# Patient Record
Sex: MALE | Race: WHITE | NOT HISPANIC OR LATINO | Employment: FULL TIME | URBAN - METROPOLITAN AREA
[De-identification: names, ages, dates, MRNs, and addresses within clinical notes are randomized per-mention and may not be internally consistent; named-entity substitution may affect disease eponyms.]

---

## 2023-03-09 LAB
ALANINE AMINOTRANSFERASE (SGPT) (U/L) IN SER/PLAS: 19 U/L (ref 10–52)
ALBUMIN (G/DL) IN SER/PLAS: 3.8 G/DL (ref 3.4–5)
ALKALINE PHOSPHATASE (U/L) IN SER/PLAS: 99 U/L (ref 33–136)
ANION GAP IN SER/PLAS: 16 MMOL/L (ref 10–20)
ASPARTATE AMINOTRANSFERASE (SGOT) (U/L) IN SER/PLAS: 45 U/L (ref 9–39)
BASOPHILS (10*3/UL) IN BLOOD BY AUTOMATED COUNT: 0.03 X10E9/L (ref 0–0.1)
BASOPHILS/100 LEUKOCYTES IN BLOOD BY AUTOMATED COUNT: 1 % (ref 0–2)
BILIRUBIN TOTAL (MG/DL) IN SER/PLAS: 0.4 MG/DL (ref 0–1.2)
CALCIUM (MG/DL) IN SER/PLAS: 8.5 MG/DL (ref 8.6–10.3)
CARBON DIOXIDE, TOTAL (MMOL/L) IN SER/PLAS: 24 MMOL/L (ref 21–32)
CHLORIDE (MMOL/L) IN SER/PLAS: 93 MMOL/L (ref 98–107)
CREATININE (MG/DL) IN SER/PLAS: 0.69 MG/DL (ref 0.5–1.3)
EOSINOPHILS (10*3/UL) IN BLOOD BY AUTOMATED COUNT: 0.05 X10E9/L (ref 0–0.7)
EOSINOPHILS/100 LEUKOCYTES IN BLOOD BY AUTOMATED COUNT: 1.6 % (ref 0–6)
ERYTHROCYTE DISTRIBUTION WIDTH (RATIO) BY AUTOMATED COUNT: 14.8 % (ref 11.5–14.5)
ERYTHROCYTE MEAN CORPUSCULAR HEMOGLOBIN CONCENTRATION (G/DL) BY AUTOMATED: 33 G/DL (ref 32–36)
ERYTHROCYTE MEAN CORPUSCULAR VOLUME (FL) BY AUTOMATED COUNT: 94 FL (ref 80–100)
ERYTHROCYTES (10*6/UL) IN BLOOD BY AUTOMATED COUNT: 3.57 X10E12/L (ref 4.5–5.9)
ESTIMATED AVERAGE GLUCOSE FOR HBA1C: 146 MG/DL
FERRITIN (UG/LL) IN SER/PLAS: 48 UG/L (ref 20–300)
GFR MALE: >90 ML/MIN/1.73M2
GLUCOSE (MG/DL) IN SER/PLAS: 130 MG/DL (ref 74–99)
HEMATOCRIT (%) IN BLOOD BY AUTOMATED COUNT: 33.6 % (ref 41–52)
HEMOGLOBIN (G/DL) IN BLOOD: 11.1 G/DL (ref 13.5–17.5)
HEMOGLOBIN A1C/HEMOGLOBIN TOTAL IN BLOOD: 6.7 %
IMMATURE GRANULOCYTES/100 LEUKOCYTES IN BLOOD BY AUTOMATED COUNT: 0.3 % (ref 0–0.9)
INR IN PPP BY COAGULATION ASSAY: 1.3 (ref 0.9–1.1)
LEUKOCYTES (10*3/UL) IN BLOOD BY AUTOMATED COUNT: 3.1 X10E9/L (ref 4.4–11.3)
LYMPHOCYTES (10*3/UL) IN BLOOD BY AUTOMATED COUNT: 0.8 X10E9/L (ref 1.2–4.8)
LYMPHOCYTES/100 LEUKOCYTES IN BLOOD BY AUTOMATED COUNT: 25.7 % (ref 13–44)
MONOCYTES (10*3/UL) IN BLOOD BY AUTOMATED COUNT: 0.48 X10E9/L (ref 0.1–1)
MONOCYTES/100 LEUKOCYTES IN BLOOD BY AUTOMATED COUNT: 15.4 % (ref 2–10)
NEUTROPHILS (10*3/UL) IN BLOOD BY AUTOMATED COUNT: 1.74 X10E9/L (ref 1.2–7.7)
NEUTROPHILS/100 LEUKOCYTES IN BLOOD BY AUTOMATED COUNT: 56 % (ref 40–80)
PLATELETS (10*3/UL) IN BLOOD AUTOMATED COUNT: 183 X10E9/L (ref 150–450)
POTASSIUM (MMOL/L) IN SER/PLAS: 4.7 MMOL/L (ref 3.5–5.3)
PROTEIN TOTAL: 7 G/DL (ref 6.4–8.2)
PROTHROMBIN TIME (PT) IN PPP BY COAGULATION ASSAY: 14.8 SEC (ref 9.8–13.4)
SODIUM (MMOL/L) IN SER/PLAS: 128 MMOL/L (ref 136–145)
UREA NITROGEN (MG/DL) IN SER/PLAS: 10 MG/DL (ref 6–23)

## 2023-03-10 LAB — STAPH/MRSA SCREEN, CULTURE: NORMAL

## 2023-03-11 LAB
ANION GAP IN SER/PLAS: 12 MMOL/L (ref 10–20)
CALCIUM (MG/DL) IN SER/PLAS: 8.3 MG/DL (ref 8.6–10.3)
CARBON DIOXIDE, TOTAL (MMOL/L) IN SER/PLAS: 25 MMOL/L (ref 21–32)
CHLORIDE (MMOL/L) IN SER/PLAS: 100 MMOL/L (ref 98–107)
CREATININE (MG/DL) IN SER/PLAS: 0.78 MG/DL (ref 0.5–1.3)
GFR MALE: >90 ML/MIN/1.73M2
GLUCOSE (MG/DL) IN SER/PLAS: 185 MG/DL (ref 74–99)
POTASSIUM (MMOL/L) IN SER/PLAS: 4.8 MMOL/L (ref 3.5–5.3)
SODIUM (MMOL/L) IN SER/PLAS: 132 MMOL/L (ref 136–145)
UREA NITROGEN (MG/DL) IN SER/PLAS: 10 MG/DL (ref 6–23)

## 2023-03-25 LAB — SARS-COV-2 RESULT: NOT DETECTED

## 2023-04-03 ENCOUNTER — PATIENT OUTREACH (OUTPATIENT)
Dept: CARE COORDINATION | Facility: CLINIC | Age: 63
End: 2023-04-03

## 2023-04-03 ENCOUNTER — DOCUMENTATION (OUTPATIENT)
Dept: CARE COORDINATION | Facility: CLINIC | Age: 63
End: 2023-04-03

## 2023-04-11 RX ORDER — POTASSIUM CHLORIDE 1500 MG/1
1 TABLET, EXTENDED RELEASE ORAL DAILY
COMMUNITY
Start: 2022-07-06 | End: 2024-01-05 | Stop reason: WASHOUT

## 2023-04-11 RX ORDER — METOPROLOL TARTRATE 50 MG/1
50 TABLET ORAL 2 TIMES DAILY
COMMUNITY
Start: 2021-08-05 | End: 2023-12-14 | Stop reason: WASHOUT

## 2023-04-11 RX ORDER — LANOLIN ALCOHOL/MO/W.PET/CERES
1 CREAM (GRAM) TOPICAL DAILY
COMMUNITY
Start: 2022-07-06

## 2023-04-11 RX ORDER — APIXABAN 5 MG/1
5 TABLET, FILM COATED ORAL 2 TIMES DAILY
COMMUNITY
Start: 2021-08-05 | End: 2023-12-14 | Stop reason: WASHOUT

## 2023-04-11 RX ORDER — TAMSULOSIN HYDROCHLORIDE 0.4 MG/1
0.4 CAPSULE ORAL DAILY
COMMUNITY
Start: 2020-02-27 | End: 2023-12-14 | Stop reason: WASHOUT

## 2023-04-11 RX ORDER — KETOCONAZOLE 20 MG/G
CREAM TOPICAL
COMMUNITY
Start: 2021-10-29

## 2023-04-11 RX ORDER — FUROSEMIDE 40 MG/1
20 TABLET ORAL DAILY
COMMUNITY
Start: 2022-06-11 | End: 2023-12-06 | Stop reason: SDUPTHER

## 2023-04-11 RX ORDER — METFORMIN HYDROCHLORIDE 500 MG/1
500 TABLET ORAL 2 TIMES DAILY
COMMUNITY
Start: 2020-03-24 | End: 2023-05-17 | Stop reason: SDUPTHER

## 2023-04-11 RX ORDER — FOLIC ACID 1 MG/1
1 TABLET ORAL DAILY
COMMUNITY
Start: 2015-10-14 | End: 2023-12-06 | Stop reason: SDUPTHER

## 2023-04-11 RX ORDER — MULTIVITAMIN
1 TABLET ORAL DAILY
COMMUNITY
End: 2023-04-12 | Stop reason: SDUPTHER

## 2023-04-11 RX ORDER — IBUPROFEN 200 MG
1 TABLET ORAL DAILY
COMMUNITY

## 2023-04-11 RX ORDER — SULFASALAZINE 500 MG/1
TABLET ORAL 2 TIMES DAILY
COMMUNITY
Start: 2013-08-16 | End: 2023-04-12 | Stop reason: ALTCHOICE

## 2023-04-11 RX ORDER — LISINOPRIL 20 MG/1
1 TABLET ORAL DAILY
COMMUNITY
Start: 2022-06-11 | End: 2023-12-14 | Stop reason: WASHOUT

## 2023-04-11 RX ORDER — NIFEDIPINE 60 MG/1
1 TABLET, EXTENDED RELEASE ORAL DAILY
COMMUNITY
Start: 2022-09-29 | End: 2023-05-17 | Stop reason: SDUPTHER

## 2023-04-11 RX ORDER — SPIRONOLACTONE 100 MG/1
50 TABLET, FILM COATED ORAL DAILY
COMMUNITY
Start: 2022-06-11 | End: 2024-01-05 | Stop reason: WASHOUT

## 2023-04-12 ENCOUNTER — OFFICE VISIT (OUTPATIENT)
Dept: PRIMARY CARE | Facility: CLINIC | Age: 63
End: 2023-04-12
Payer: COMMERCIAL

## 2023-04-12 VITALS
BODY MASS INDEX: 26.97 KG/M2 | HEART RATE: 130 BPM | DIASTOLIC BLOOD PRESSURE: 70 MMHG | TEMPERATURE: 97.8 F | OXYGEN SATURATION: 98 % | WEIGHT: 188.4 LBS | SYSTOLIC BLOOD PRESSURE: 118 MMHG | HEIGHT: 70 IN

## 2023-04-12 DIAGNOSIS — Z96.642 S/P TOTAL LEFT HIP ARTHROPLASTY: Primary | ICD-10-CM

## 2023-04-12 DIAGNOSIS — I48.0 PAF (PAROXYSMAL ATRIAL FIBRILLATION) (MULTI): ICD-10-CM

## 2023-04-12 DIAGNOSIS — E11.9 TYPE 2 DIABETES MELLITUS WITHOUT COMPLICATION, WITHOUT LONG-TERM CURRENT USE OF INSULIN (MULTI): ICD-10-CM

## 2023-04-12 DIAGNOSIS — I10 ESSENTIAL HYPERTENSION: ICD-10-CM

## 2023-04-12 PROBLEM — C61 ADENOCARCINOMA OF PROSTATE (MULTI): Status: ACTIVE | Noted: 2023-04-12

## 2023-04-12 PROBLEM — E83.42 HYPOMAGNESEMIA: Status: ACTIVE | Noted: 2023-04-12

## 2023-04-12 PROBLEM — N52.9 ERECTILE DYSFUNCTION: Status: ACTIVE | Noted: 2023-04-12

## 2023-04-12 PROBLEM — G47.33 OSA ON CPAP: Status: ACTIVE | Noted: 2023-04-12

## 2023-04-12 PROBLEM — E83.110 HEREDITARY HEMOCHROMATOSIS (CMS-HCC): Status: ACTIVE | Noted: 2023-04-12

## 2023-04-12 PROBLEM — K51.90 ULCERATIVE COLITIS (MULTI): Status: ACTIVE | Noted: 2023-04-12

## 2023-04-12 PROBLEM — M16.12 PRIMARY LOCALIZED OSTEOARTHROSIS OF LEFT HIP: Status: ACTIVE | Noted: 2023-04-12

## 2023-04-12 PROBLEM — M54.50 LOWER BACK PAIN: Status: ACTIVE | Noted: 2023-04-12

## 2023-04-12 PROCEDURE — 4010F ACE/ARB THERAPY RXD/TAKEN: CPT | Performed by: INTERNAL MEDICINE

## 2023-04-12 PROCEDURE — 93000 ELECTROCARDIOGRAM COMPLETE: CPT | Performed by: INTERNAL MEDICINE

## 2023-04-12 PROCEDURE — 3078F DIAST BP <80 MM HG: CPT | Performed by: INTERNAL MEDICINE

## 2023-04-12 PROCEDURE — 3046F HEMOGLOBIN A1C LEVEL >9.0%: CPT | Performed by: INTERNAL MEDICINE

## 2023-04-12 PROCEDURE — 1036F TOBACCO NON-USER: CPT | Performed by: INTERNAL MEDICINE

## 2023-04-12 PROCEDURE — 3074F SYST BP LT 130 MM HG: CPT | Performed by: INTERNAL MEDICINE

## 2023-04-12 PROCEDURE — 99214 OFFICE O/P EST MOD 30 MIN: CPT | Performed by: INTERNAL MEDICINE

## 2023-04-12 RX ORDER — NAPROXEN SODIUM 220 MG
TABLET ORAL EVERY 12 HOURS PRN
COMMUNITY

## 2023-04-12 ASSESSMENT — ENCOUNTER SYMPTOMS
MUSCULOSKELETAL NEGATIVE: 1
CONSTITUTIONAL NEGATIVE: 1
RESPIRATORY NEGATIVE: 1
CARDIOVASCULAR NEGATIVE: 1
HEMATOLOGIC/LYMPHATIC NEGATIVE: 1
GASTROINTESTINAL NEGATIVE: 1
ALLERGIC/IMMUNOLOGIC NEGATIVE: 1
PSYCHIATRIC NEGATIVE: 1
NEUROLOGICAL NEGATIVE: 1
ENDOCRINE NEGATIVE: 1
EYES NEGATIVE: 1

## 2023-04-12 NOTE — PROGRESS NOTES
"Subjective   Patient ID: Kiko Petersen is a 62 y.o. male who presents for Hospital Follow-up.  Patient presents today in follow up re:   recent hip arthroplasty on the left.  I am unclear as to why he has medical follow up today, but I am glad he is here as we discovered by exam and confirmed with ECG that patient is back in a-fib.  He has not been in a-fib for quite some time and just saw Dr. Burton 2 weeks ago for pre-op and was in NSR.  He reports he is feeling well and recovery from the surgery is going well.        Review of Systems   Constitutional: Negative.    HENT: Negative.     Eyes: Negative.    Respiratory: Negative.     Cardiovascular: Negative.    Gastrointestinal: Negative.    Endocrine: Negative.    Genitourinary: Negative.    Musculoskeletal: Negative.    Skin: Negative.    Allergic/Immunologic: Negative.    Neurological: Negative.    Hematological: Negative.    Psychiatric/Behavioral: Negative.         Objective     Blood pressure 118/70, pulse (!) 130, temperature 36.6 °C (97.8 °F), temperature source Temporal, height 1.765 m (5' 9.5\"), weight 85.5 kg (188 lb 6.4 oz), SpO2 98 %.   Physical Exam  Constitutional:       Appearance: Normal appearance.   Neck:      Vascular: No carotid bruit.   Cardiovascular:      Rate and Rhythm: Tachycardia present. Rhythm irregular.      Pulses: Normal pulses.      Heart sounds: Normal heart sounds. No murmur heard.  Pulmonary:      Effort: Pulmonary effort is normal.      Breath sounds: Normal breath sounds. No wheezing or rales.   Abdominal:      General: Abdomen is flat. There is no distension.      Palpations: Abdomen is soft.      Tenderness: There is no abdominal tenderness.   Musculoskeletal:         General: Normal range of motion.      Cervical back: Normal range of motion and neck supple.   Skin:     General: Skin is warm and dry.   Neurological:      General: No focal deficit present.      Mental Status: He is alert and oriented to person, place, and " time.   Psychiatric:         Mood and Affect: Mood normal.         Behavior: Behavior normal.         Assessment/Plan   Problem List Items Addressed This Visit          Circulatory    Essential hypertension       Endocrine/Metabolic    Type 2 diabetes mellitus without complication (CMS/HCC)     Other Visit Diagnoses       S/P total left hip arthroplasty    -  Primary    PAF (paroxysmal atrial fibrillation) (CMS/HCC)        Relevant Medications    metoprolol tartrate (Lopressor) 50 mg tablet    NIFEdipine CC 60 mg 24 hr tablet        Patient is doing well post-op.  A-fib is recurrent and we will arrange for OV with Dr. Josephine ASAP.  BP well controlled on current therapy.  Will continue present therapy and follow.  Sugars well controlled overall with A1c at goal.  Will continue present medical therapy and follow up.    Follow up as scheduled

## 2023-05-15 LAB
ALANINE AMINOTRANSFERASE (SGPT) (U/L) IN SER/PLAS: 11 U/L (ref 10–52)
ALBUMIN (G/DL) IN SER/PLAS: 3.9 G/DL (ref 3.4–5)
ALKALINE PHOSPHATASE (U/L) IN SER/PLAS: 89 U/L (ref 33–136)
ANION GAP IN SER/PLAS: 16 MMOL/L (ref 10–20)
ASPARTATE AMINOTRANSFERASE (SGOT) (U/L) IN SER/PLAS: 29 U/L (ref 9–39)
BASOPHILS (10*3/UL) IN BLOOD BY AUTOMATED COUNT: 0.07 X10E9/L (ref 0–0.1)
BASOPHILS/100 LEUKOCYTES IN BLOOD BY AUTOMATED COUNT: 2 % (ref 0–2)
BILIRUBIN TOTAL (MG/DL) IN SER/PLAS: 0.5 MG/DL (ref 0–1.2)
CALCIUM (MG/DL) IN SER/PLAS: 9 MG/DL (ref 8.6–10.6)
CARBON DIOXIDE, TOTAL (MMOL/L) IN SER/PLAS: 25 MMOL/L (ref 21–32)
CHLORIDE (MMOL/L) IN SER/PLAS: 94 MMOL/L (ref 98–107)
CHOLESTEROL (MG/DL) IN SER/PLAS: 154 MG/DL (ref 0–199)
CHOLESTEROL IN HDL (MG/DL) IN SER/PLAS: 88.9 MG/DL
CHOLESTEROL/HDL RATIO: 1.7
CREATININE (MG/DL) IN SER/PLAS: 0.74 MG/DL (ref 0.5–1.3)
EOSINOPHILS (10*3/UL) IN BLOOD BY AUTOMATED COUNT: 0.19 X10E9/L (ref 0–0.7)
EOSINOPHILS/100 LEUKOCYTES IN BLOOD BY AUTOMATED COUNT: 5.5 % (ref 0–6)
ERYTHROCYTE DISTRIBUTION WIDTH (RATIO) BY AUTOMATED COUNT: 14.1 % (ref 11.5–14.5)
ERYTHROCYTE MEAN CORPUSCULAR HEMOGLOBIN CONCENTRATION (G/DL) BY AUTOMATED: 31.8 G/DL (ref 32–36)
ERYTHROCYTE MEAN CORPUSCULAR VOLUME (FL) BY AUTOMATED COUNT: 90 FL (ref 80–100)
ERYTHROCYTES (10*6/UL) IN BLOOD BY AUTOMATED COUNT: 3.75 X10E12/L (ref 4.5–5.9)
ESTIMATED AVERAGE GLUCOSE FOR HBA1C: 143 MG/DL
GFR MALE: >90 ML/MIN/1.73M2
GLUCOSE (MG/DL) IN SER/PLAS: 112 MG/DL (ref 74–99)
HEMATOCRIT (%) IN BLOOD BY AUTOMATED COUNT: 33.6 % (ref 41–52)
HEMOGLOBIN (G/DL) IN BLOOD: 10.7 G/DL (ref 13.5–17.5)
HEMOGLOBIN A1C/HEMOGLOBIN TOTAL IN BLOOD: 6.6 %
IMMATURE GRANULOCYTES/100 LEUKOCYTES IN BLOOD BY AUTOMATED COUNT: 0 % (ref 0–0.9)
LDL: 48 MG/DL (ref 0–99)
LEUKOCYTES (10*3/UL) IN BLOOD BY AUTOMATED COUNT: 3.4 X10E9/L (ref 4.4–11.3)
LYMPHOCYTES (10*3/UL) IN BLOOD BY AUTOMATED COUNT: 0.98 X10E9/L (ref 1.2–4.8)
LYMPHOCYTES/100 LEUKOCYTES IN BLOOD BY AUTOMATED COUNT: 28.5 % (ref 13–44)
MONOCYTES (10*3/UL) IN BLOOD BY AUTOMATED COUNT: 0.58 X10E9/L (ref 0.1–1)
MONOCYTES/100 LEUKOCYTES IN BLOOD BY AUTOMATED COUNT: 16.9 % (ref 2–10)
NEUTROPHILS (10*3/UL) IN BLOOD BY AUTOMATED COUNT: 1.62 X10E9/L (ref 1.2–7.7)
NEUTROPHILS/100 LEUKOCYTES IN BLOOD BY AUTOMATED COUNT: 47.1 % (ref 40–80)
NRBC (PER 100 WBCS) BY AUTOMATED COUNT: 0 /100 WBC (ref 0–0)
PLATELETS (10*3/UL) IN BLOOD AUTOMATED COUNT: 177 X10E9/L (ref 150–450)
POTASSIUM (MMOL/L) IN SER/PLAS: 4.7 MMOL/L (ref 3.5–5.3)
PROTEIN TOTAL: 6.8 G/DL (ref 6.4–8.2)
SODIUM (MMOL/L) IN SER/PLAS: 130 MMOL/L (ref 136–145)
TRIGLYCERIDE (MG/DL) IN SER/PLAS: 87 MG/DL (ref 0–149)
UREA NITROGEN (MG/DL) IN SER/PLAS: 9 MG/DL (ref 6–23)
VLDL: 17 MG/DL (ref 0–40)

## 2023-05-17 ENCOUNTER — OFFICE VISIT (OUTPATIENT)
Dept: PRIMARY CARE | Facility: CLINIC | Age: 63
End: 2023-05-17
Payer: COMMERCIAL

## 2023-05-17 VITALS
WEIGHT: 185.8 LBS | OXYGEN SATURATION: 99 % | DIASTOLIC BLOOD PRESSURE: 60 MMHG | BODY MASS INDEX: 26.6 KG/M2 | HEIGHT: 70 IN | SYSTOLIC BLOOD PRESSURE: 118 MMHG | TEMPERATURE: 98 F | HEART RATE: 78 BPM

## 2023-05-17 DIAGNOSIS — I10 ESSENTIAL HYPERTENSION: ICD-10-CM

## 2023-05-17 DIAGNOSIS — J30.1 SEASONAL ALLERGIC RHINITIS DUE TO POLLEN: ICD-10-CM

## 2023-05-17 DIAGNOSIS — Z96.642 HISTORY OF LEFT HIP REPLACEMENT: ICD-10-CM

## 2023-05-17 DIAGNOSIS — E11.9 TYPE 2 DIABETES MELLITUS WITHOUT COMPLICATION, WITHOUT LONG-TERM CURRENT USE OF INSULIN (MULTI): Primary | ICD-10-CM

## 2023-05-17 DIAGNOSIS — C61 ADENOCARCINOMA OF PROSTATE (MULTI): ICD-10-CM

## 2023-05-17 PROCEDURE — 4010F ACE/ARB THERAPY RXD/TAKEN: CPT | Performed by: INTERNAL MEDICINE

## 2023-05-17 PROCEDURE — 3078F DIAST BP <80 MM HG: CPT | Performed by: INTERNAL MEDICINE

## 2023-05-17 PROCEDURE — 99214 OFFICE O/P EST MOD 30 MIN: CPT | Performed by: INTERNAL MEDICINE

## 2023-05-17 PROCEDURE — 3074F SYST BP LT 130 MM HG: CPT | Performed by: INTERNAL MEDICINE

## 2023-05-17 PROCEDURE — 1036F TOBACCO NON-USER: CPT | Performed by: INTERNAL MEDICINE

## 2023-05-17 PROCEDURE — 3044F HG A1C LEVEL LT 7.0%: CPT | Performed by: INTERNAL MEDICINE

## 2023-05-17 RX ORDER — NIFEDIPINE 60 MG/1
60 TABLET, FILM COATED, EXTENDED RELEASE ORAL DAILY
Qty: 90 TABLET | Refills: 0 | Status: SHIPPED | OUTPATIENT
Start: 2023-05-17 | End: 2023-12-14 | Stop reason: SDUPTHER

## 2023-05-17 RX ORDER — METFORMIN HYDROCHLORIDE 500 MG/1
500 TABLET ORAL
Qty: 180 TABLET | Refills: 0 | Status: SHIPPED | OUTPATIENT
Start: 2023-05-17 | End: 2023-08-16 | Stop reason: SDUPTHER

## 2023-05-17 ASSESSMENT — ENCOUNTER SYMPTOMS
ENDOCRINE NEGATIVE: 1
HEMATOLOGIC/LYMPHATIC NEGATIVE: 1
EYES NEGATIVE: 1
GASTROINTESTINAL NEGATIVE: 1
RESPIRATORY NEGATIVE: 1
PSYCHIATRIC NEGATIVE: 1
CONSTITUTIONAL NEGATIVE: 1
CARDIOVASCULAR NEGATIVE: 1
ALLERGIC/IMMUNOLOGIC NEGATIVE: 1
NEUROLOGICAL NEGATIVE: 1
MUSCULOSKELETAL NEGATIVE: 1

## 2023-05-17 NOTE — PROGRESS NOTES
"Subjective   Patient ID: Kiko Petersen is a 62 y.o. male who presents for 3 month follow up.  Patient presents in follow up regarding hypertension.  Blood pressure has been well controlled on current therapy.  No adverse effects of medication reported.  Patient denies chest pain, palpitations, shortness of breath, orthopnea, fatigue or edema.    Patient presents in follow up re:  Type 2 diabetes.  Patient has been compliant with medical therapy as prescribed and mostly compliant with diet and exercise recommendations.  HgbA1c has been maintained at goal level.  No hypoglycemia reported and no other associated complaints.    Patient underwent left hip arthroplasty on 3/28 with good outcome.  He did have anemia post-op wth Hgb of 8.8, but has improved to 10.7 on recent lab.        Review of Systems   Constitutional: Negative.    HENT: Negative.     Eyes: Negative.    Respiratory: Negative.     Cardiovascular: Negative.    Gastrointestinal: Negative.    Endocrine: Negative.    Genitourinary: Negative.    Musculoskeletal: Negative.    Skin: Negative.    Allergic/Immunologic: Negative.    Neurological: Negative.    Hematological: Negative.    Psychiatric/Behavioral: Negative.         Objective     Blood pressure 118/60, pulse 78, temperature 36.7 °C (98 °F), temperature source Temporal, height 1.765 m (5' 9.5\"), weight 84.3 kg (185 lb 12.8 oz), SpO2 99 %.   Physical Exam  Constitutional:       Appearance: Normal appearance.   Neck:      Vascular: No carotid bruit.   Cardiovascular:      Rate and Rhythm: Normal rate and regular rhythm.      Pulses: Normal pulses.      Heart sounds: Normal heart sounds. No murmur heard.  Pulmonary:      Effort: Pulmonary effort is normal.      Breath sounds: Normal breath sounds. No wheezing or rales.   Abdominal:      General: Abdomen is flat. There is no distension.      Palpations: Abdomen is soft.      Tenderness: There is no abdominal tenderness.   Musculoskeletal:         General: " Normal range of motion.      Cervical back: Normal range of motion and neck supple.   Skin:     General: Skin is warm and dry.   Neurological:      General: No focal deficit present.      Mental Status: He is alert and oriented to person, place, and time.   Psychiatric:         Mood and Affect: Mood normal.         Behavior: Behavior normal.         Assessment/Plan   Problem List Items Addressed This Visit          Circulatory    Essential hypertension       Genitourinary    Adenocarcinoma of prostate (CMS/HCC)       Endocrine/Metabolic    Type 2 diabetes mellitus without complication (CMS/HCC) - Primary     Other Visit Diagnoses       History of left hip replacement        Seasonal allergic rhinitis due to pollen              Sugars well controlled overall with A1c at goal.  Will continue present medical therapy and follow up.  BP well controlled on current therapy.  Will continue present therapy and follow.  He is doing well s/p left hip arthroplasty.  He had colonoscopy in 8/2022.  He follows with Dr. Lyle re:  hx of prostate cancer.  He also follows with Hepatology for chronic liver issues.  He has been having issues with allergy Sx this spring.  He states that OTC therapy has not helped.  He is advised on using combination therapy with Claritin and Flonase in the generic forms.    > 30 minutes was spent with the patient today, the majority of which was spent on review of history and medications as well as counselling on care plan and/or coordination of care.     Follow up in 3 months  Lab to be drawn 1 week prior to next office visit.

## 2023-07-28 ENCOUNTER — PATIENT OUTREACH (OUTPATIENT)
Dept: CARE COORDINATION | Facility: CLINIC | Age: 63
End: 2023-07-28

## 2023-07-28 ENCOUNTER — PATIENT OUTREACH (OUTPATIENT)
Dept: PRIMARY CARE | Facility: CLINIC | Age: 63
End: 2023-07-28

## 2023-07-28 NOTE — PROGRESS NOTES
Medications  Medications reviewed with patient/caregiver?: Yes (7/28/2023  2:35 PM)  Is the patient having any side effects they believe may be caused by any medication additions or changes?: No (7/28/2023  2:35 PM)  Does the patient have all medications ordered at discharge?: No (hasn't picked them up yet) (7/28/2023  2:35 PM)  Prescription Comments: Urged Kiko to get his new scripts (7/28/2023  2:35 PM)    Appointments  Does the patient have a primary care provider?: Yes (follow up with specialists and PCP) (7/28/2023  2:35 PM)  Has the patient kept scheduled appointments due by today?: Not applicable (7/28/2023  2:35 PM)    Patient Teaching  Does the patient have access to their discharge instructions?: Yes (7/28/2023  2:35 PM)  Care Management Interventions: Reviewed instructions with patient (7/28/2023  2:35 PM)  What is the patient's perception of their health status since discharge?: Improving (7/28/2023  2:35 PM)  Is the patient/caregiver able to teach back the hierarchy of who to call/visit for symptoms/problems? PCP, Specialist, Home Health nurse, Urgent Care, ED, 911: Yes (7/28/2023  2:35 PM)    Wrap Up  Wrap Up Additional Comments: Kiko feels well, syncopal episodes have ceased, understands his fluid restriction, has appts scheduled.  Biggest concern now is financial (7/28/2023  2:35 PM)

## 2023-08-02 ENCOUNTER — PATIENT OUTREACH (OUTPATIENT)
Dept: CARE COORDINATION | Facility: CLINIC | Age: 63
End: 2023-08-02

## 2023-08-02 SDOH — ECONOMIC STABILITY: GENERAL: WOULD YOU LIKE HELP WITH ANY OF THE FOLLOWING NEEDS?: HOUSING INSTABILITY;EMPLOYMENT

## 2023-08-02 SDOH — ECONOMIC STABILITY: FOOD INSECURITY
ARE ANY OF YOUR NEEDS URGENT? FOR EXAMPLE, UNCERTAINTY OF WHERE YOU WILL GET YOUR NEXT MEAL OR NOT HAVING THE MEDICATIONS YOU NEED TO TAKE TOMORROW.: YES

## 2023-08-02 NOTE — PROGRESS NOTES
[facility] outreach call to patient per request by GEORGIA Galan RN Care Manager. Patient is seeking assistance with rent. Patient has applied for unemployment through [facility], struggling to get response. [facility] submitted referral via [email or web address] for agency in [facility] that provides rental assistance. [facility] sent e-mail follow up to patient.     [facility] encouraged patient to contact [place] [employer] to clarify status of unemployment application.    [facility] will follow up with patient in 1 week.     CECILIA Brandt   III   Population Health/Accountable Care Organization  Office Phone: [phone]  [email or web address]

## 2023-08-03 ENCOUNTER — LAB (OUTPATIENT)
Dept: LAB | Facility: LAB | Age: 63
End: 2023-08-03
Payer: COMMERCIAL

## 2023-08-03 ENCOUNTER — OFFICE VISIT (OUTPATIENT)
Dept: PRIMARY CARE | Facility: CLINIC | Age: 63
End: 2023-08-03
Payer: COMMERCIAL

## 2023-08-03 VITALS
HEIGHT: 70 IN | DIASTOLIC BLOOD PRESSURE: 62 MMHG | SYSTOLIC BLOOD PRESSURE: 139 MMHG | HEART RATE: 63 BPM | WEIGHT: 181.6 LBS | TEMPERATURE: 98 F | BODY MASS INDEX: 26 KG/M2 | OXYGEN SATURATION: 97 %

## 2023-08-03 DIAGNOSIS — R55 SYNCOPE, UNSPECIFIED SYNCOPE TYPE: ICD-10-CM

## 2023-08-03 DIAGNOSIS — E87.1 HYPONATREMIA: ICD-10-CM

## 2023-08-03 DIAGNOSIS — I10 ESSENTIAL HYPERTENSION: ICD-10-CM

## 2023-08-03 DIAGNOSIS — R55 SYNCOPE, UNSPECIFIED SYNCOPE TYPE: Primary | ICD-10-CM

## 2023-08-03 LAB
ANION GAP IN SER/PLAS: 13 MMOL/L (ref 10–20)
CALCIUM (MG/DL) IN SER/PLAS: 8.7 MG/DL (ref 8.6–10.3)
CARBON DIOXIDE, TOTAL (MMOL/L) IN SER/PLAS: 25 MMOL/L (ref 21–32)
CHLORIDE (MMOL/L) IN SER/PLAS: 103 MMOL/L (ref 98–107)
CREATININE (MG/DL) IN SER/PLAS: 0.75 MG/DL (ref 0.5–1.3)
GFR MALE: >90 ML/MIN/1.73M2
GLUCOSE (MG/DL) IN SER/PLAS: 86 MG/DL (ref 74–99)
POTASSIUM (MMOL/L) IN SER/PLAS: 4.7 MMOL/L (ref 3.5–5.3)
SODIUM (MMOL/L) IN SER/PLAS: 136 MMOL/L (ref 136–145)
UREA NITROGEN (MG/DL) IN SER/PLAS: 7 MG/DL (ref 6–23)

## 2023-08-03 PROCEDURE — 99214 OFFICE O/P EST MOD 30 MIN: CPT | Performed by: INTERNAL MEDICINE

## 2023-08-03 PROCEDURE — 80048 BASIC METABOLIC PNL TOTAL CA: CPT

## 2023-08-03 PROCEDURE — 3078F DIAST BP <80 MM HG: CPT | Performed by: INTERNAL MEDICINE

## 2023-08-03 PROCEDURE — 36415 COLL VENOUS BLD VENIPUNCTURE: CPT

## 2023-08-03 PROCEDURE — 4010F ACE/ARB THERAPY RXD/TAKEN: CPT | Performed by: INTERNAL MEDICINE

## 2023-08-03 PROCEDURE — 3075F SYST BP GE 130 - 139MM HG: CPT | Performed by: INTERNAL MEDICINE

## 2023-08-03 PROCEDURE — 3044F HG A1C LEVEL LT 7.0%: CPT | Performed by: INTERNAL MEDICINE

## 2023-08-03 PROCEDURE — 1036F TOBACCO NON-USER: CPT | Performed by: INTERNAL MEDICINE

## 2023-08-03 ASSESSMENT — ENCOUNTER SYMPTOMS
ALLERGIC/IMMUNOLOGIC NEGATIVE: 1
ENDOCRINE NEGATIVE: 1
EYES NEGATIVE: 1
MUSCULOSKELETAL NEGATIVE: 1
HEMATOLOGIC/LYMPHATIC NEGATIVE: 1
RESPIRATORY NEGATIVE: 1
GASTROINTESTINAL NEGATIVE: 1
CARDIOVASCULAR NEGATIVE: 1
CONSTITUTIONAL NEGATIVE: 1
PSYCHIATRIC NEGATIVE: 1
NEUROLOGICAL NEGATIVE: 1

## 2023-08-03 NOTE — PROGRESS NOTES
"Subjective   Patient ID: Kiko Petersen is a 62 y.o. male who presents for hospital [facility] up .  Patient presents today in follow up re:   recent hospitalization for syncopal episodes and was found to be profoundly hyponatremic.  He was hydrated and sodium improved to 128 by time of DC on 7/27.  I have the ER record, but no records from the hospital otherwise.  He was told to not take amlodipine, tamsulosin, spironolactone and furosemide at the time of DC, he states.  He has had no further syncopal episodes since DC last week.        Review of Systems   Constitutional: Negative.    HENT: Negative.     Eyes: Negative.    Respiratory: Negative.     Cardiovascular: Negative.    Gastrointestinal: Negative.    Endocrine: Negative.    Genitourinary: Negative.    Musculoskeletal: Negative.    Skin: Negative.    Allergic/Immunologic: Negative.    Neurological: Negative.    Hematological: Negative.    Psychiatric/Behavioral: Negative.         Objective     Blood pressure 139/62, pulse 63, temperature 36.7 °C (98 °F), temperature source Temporal, height 1.778 m (5' 10\"), weight 82.4 kg (181 lb 9.6 oz), SpO2 97 %.   Physical Exam  Vitals reviewed.   Constitutional:       Appearance: Normal appearance.   Neck:      Vascular: No carotid bruit.   Cardiovascular:      Rate and Rhythm: Normal rate and regular rhythm.      Pulses: Normal pulses.      Heart sounds: Normal heart sounds. No murmur heard.  Pulmonary:      Effort: Pulmonary effort is normal.      Breath sounds: Normal breath sounds. No wheezing or rales.   Abdominal:      General: Abdomen is flat. There is no distension.      Palpations: Abdomen is soft.      Tenderness: There is no abdominal tenderness.   Musculoskeletal:         General: Normal range of motion.      Cervical back: Normal range of motion and neck supple.   Skin:     General: Skin is warm and dry.   Neurological:      General: No focal deficit present.      Mental Status: He is alert and oriented to " person, place, and time.   Psychiatric:         Mood and Affect: Mood normal.         Behavior: Behavior normal.         Assessment/Plan   Problem List Items Addressed This Visit       Essential hypertension     Other Visit Diagnoses       Syncope, unspecified syncope type    -  Primary    Hyponatremia              Patient has had resolution of syncope with correction of sodium level.  I suspect most likely cause of the hyponatremia was the spironolactone.  Will recheck BMP today.  BP well controlled on current therapy.  Will continue present therapy and follow.   He states that over the last couple of days he has been noting a slight increase of urinary urgency and frequency with smaller amounts of urine.  This would be due to having the tamsulosin stopped while in the hospital.  Will follow this clinically for now as I doubt the tamsulosin contributed to the sodium issue and we may need to resume.    > 30 minutes was spent with the patient today, the majority of which was spent on review of history and medications as well as counselling on care plan and/or coordination of care.     Plan follow up as scheduled in about 2 weeks.  He will have lab as scheduled prior to that visit as well.

## 2023-08-07 ENCOUNTER — PATIENT OUTREACH (OUTPATIENT)
Dept: CARE COORDINATION | Facility: CLINIC | Age: 63
End: 2023-08-07

## 2023-08-07 NOTE — PROGRESS NOTES
[facility]  email follow up sent to patient updating on status of rental assistance outreach.    CECILIA Brandt   III   Population Health/Accountable Care Organization  Office Phone: [phone]  [email or web address]

## 2023-08-09 ENCOUNTER — PATIENT OUTREACH (OUTPATIENT)
Dept: CARE COORDINATION | Facility: CLINIC | Age: 63
End: 2023-08-09

## 2023-08-09 NOTE — PROGRESS NOTES
TOM GALVAN outreach to patient. Patient has contacted three additional churches in his area and is awaiting response regarding assistance with rental payment. Patient reports he called [employer], no resources available. TOM GALVAN has outreached to multiple community resources as well ([facility], [facility]), no funding available at this time/patient does not meet criteria for funding at this time.     Patient reports he has been in communication with management company for his apartment and they are aware he is seeking assistance for rental payment.     Patient reports he has received letter from [employer] requesting additional documentation which patient has provided via portal. TOM GALVAN and patient will call unemployment together 8/11/23 to assess status of patient's application.     CECILIA Brandt   III   Population Health/Accountable Care Organization  Office Phone: [phone]  [email or web address]

## 2023-08-11 ENCOUNTER — PATIENT OUTREACH (OUTPATIENT)
Dept: CARE COORDINATION | Facility: CLINIC | Age: 63
End: 2023-08-11

## 2023-08-11 NOTE — PROGRESS NOTES
TOM GALVAN telephone outreach to patient. Conference call placed to unemployment. After 1 hour on hold, TOM GALVAN and patient agreed to discontinue call. TOM GALVAN e-mailed patient links to [employer] web-site to submit inquiry on-line: [email or web address]    TOM GALVAN also provided alternative phone number for [facility] to request [facility] reset: [phone].     Patient reports he will apply for rent assistance through [employer].    TOM GALVAN will follow up with patient 8/14/23.    CECILIA Brandt   III   Population Health/Accountable Care Organization  Office Phone: [phone]  [email or web address]

## 2023-08-12 ENCOUNTER — LAB (OUTPATIENT)
Dept: LAB | Facility: LAB | Age: 63
End: 2023-08-12
Payer: COMMERCIAL

## 2023-08-12 DIAGNOSIS — E11.9 TYPE 2 DIABETES MELLITUS WITHOUT COMPLICATION, WITHOUT LONG-TERM CURRENT USE OF INSULIN (MULTI): ICD-10-CM

## 2023-08-12 LAB
ALANINE AMINOTRANSFERASE (SGPT) (U/L) IN SER/PLAS: 14 U/L (ref 10–52)
ALBUMIN (G/DL) IN SER/PLAS: 3.5 G/DL (ref 3.4–5)
ALKALINE PHOSPHATASE (U/L) IN SER/PLAS: 133 U/L (ref 33–136)
ANION GAP IN SER/PLAS: 14 MMOL/L (ref 10–20)
ASPARTATE AMINOTRANSFERASE (SGOT) (U/L) IN SER/PLAS: 33 U/L (ref 9–39)
BASOPHILS (10*3/UL) IN BLOOD BY AUTOMATED COUNT: 0.03 X10E9/L (ref 0–0.1)
BASOPHILS/100 LEUKOCYTES IN BLOOD BY AUTOMATED COUNT: 1.7 % (ref 0–2)
BILIRUBIN TOTAL (MG/DL) IN SER/PLAS: 0.5 MG/DL (ref 0–1.2)
CALCIUM (MG/DL) IN SER/PLAS: 8.5 MG/DL (ref 8.6–10.3)
CARBON DIOXIDE, TOTAL (MMOL/L) IN SER/PLAS: 23 MMOL/L (ref 21–32)
CHLORIDE (MMOL/L) IN SER/PLAS: 105 MMOL/L (ref 98–107)
CHOLESTEROL (MG/DL) IN SER/PLAS: 142 MG/DL (ref 0–199)
CHOLESTEROL IN HDL (MG/DL) IN SER/PLAS: 63.8 MG/DL
CHOLESTEROL/HDL RATIO: 2.2
CREATININE (MG/DL) IN SER/PLAS: 0.63 MG/DL (ref 0.5–1.3)
EOSINOPHILS (10*3/UL) IN BLOOD BY AUTOMATED COUNT: 0.03 X10E9/L (ref 0–0.7)
EOSINOPHILS/100 LEUKOCYTES IN BLOOD BY AUTOMATED COUNT: 1.7 % (ref 0–6)
ERYTHROCYTE DISTRIBUTION WIDTH (RATIO) BY AUTOMATED COUNT: 15.8 % (ref 11.5–14.5)
ERYTHROCYTE MEAN CORPUSCULAR HEMOGLOBIN CONCENTRATION (G/DL) BY AUTOMATED: 32.6 G/DL (ref 32–36)
ERYTHROCYTE MEAN CORPUSCULAR VOLUME (FL) BY AUTOMATED COUNT: 92 FL (ref 80–100)
ERYTHROCYTES (10*6/UL) IN BLOOD BY AUTOMATED COUNT: 3.55 X10E12/L (ref 4.5–5.9)
ESTIMATED AVERAGE GLUCOSE FOR HBA1C: 111 MG/DL
FERRITIN (UG/LL) IN SER/PLAS: 37 UG/L (ref 20–300)
GFR MALE: >90 ML/MIN/1.73M2
GLUCOSE (MG/DL) IN SER/PLAS: 101 MG/DL (ref 74–99)
HEMATOCRIT (%) IN BLOOD BY AUTOMATED COUNT: 32.8 % (ref 41–52)
HEMOGLOBIN (G/DL) IN BLOOD: 10.7 G/DL (ref 13.5–17.5)
HEMOGLOBIN A1C/HEMOGLOBIN TOTAL IN BLOOD: 5.5 %
IMMATURE GRANULOCYTES/100 LEUKOCYTES IN BLOOD BY AUTOMATED COUNT: 0 % (ref 0–0.9)
IRON (UG/DL) IN SER/PLAS: 22 UG/DL (ref 35–150)
IRON BINDING CAPACITY (UG/DL) IN SER/PLAS: 335 UG/DL (ref 240–445)
IRON SATURATION (%) IN SER/PLAS: 7 % (ref 25–45)
LDL: 50 MG/DL (ref 0–99)
LEUKOCYTES (10*3/UL) IN BLOOD BY AUTOMATED COUNT: 1.8 X10E9/L (ref 4.4–11.3)
LYMPHOCYTES (10*3/UL) IN BLOOD BY AUTOMATED COUNT: 0.75 X10E9/L (ref 1.2–4.8)
LYMPHOCYTES/100 LEUKOCYTES IN BLOOD BY AUTOMATED COUNT: 41.4 % (ref 13–44)
MONOCYTES (10*3/UL) IN BLOOD BY AUTOMATED COUNT: 0.33 X10E9/L (ref 0.1–1)
MONOCYTES/100 LEUKOCYTES IN BLOOD BY AUTOMATED COUNT: 18.2 % (ref 2–10)
NEUTROPHILS (10*3/UL) IN BLOOD BY AUTOMATED COUNT: 0.67 X10E9/L (ref 1.2–7.7)
NEUTROPHILS/100 LEUKOCYTES IN BLOOD BY AUTOMATED COUNT: 37 % (ref 40–80)
PLATELETS (10*3/UL) IN BLOOD AUTOMATED COUNT: 184 X10E9/L (ref 150–450)
POTASSIUM (MMOL/L) IN SER/PLAS: 4.6 MMOL/L (ref 3.5–5.3)
PROTEIN TOTAL: 6.4 G/DL (ref 6.4–8.2)
RBC MORPHOLOGY IN BLOOD: NORMAL
SODIUM (MMOL/L) IN SER/PLAS: 137 MMOL/L (ref 136–145)
TRIGLYCERIDE (MG/DL) IN SER/PLAS: 140 MG/DL (ref 0–149)
UREA NITROGEN (MG/DL) IN SER/PLAS: 6 MG/DL (ref 6–23)
VLDL: 28 MG/DL (ref 0–40)

## 2023-08-12 PROCEDURE — 83036 HEMOGLOBIN GLYCOSYLATED A1C: CPT

## 2023-08-12 PROCEDURE — 36415 COLL VENOUS BLD VENIPUNCTURE: CPT

## 2023-08-12 PROCEDURE — 80061 LIPID PANEL: CPT

## 2023-08-15 ENCOUNTER — PATIENT OUTREACH (OUTPATIENT)
Dept: CARE COORDINATION | Facility: CLINIC | Age: 63
End: 2023-08-15

## 2023-08-15 ENCOUNTER — DOCUMENTATION (OUTPATIENT)
Dept: CARE COORDINATION | Facility: CLINIC | Age: 63
End: 2023-08-15

## 2023-08-15 NOTE — PROGRESS NOTES
[facility] SW outreach call to patient, left voicemail requesting return call.    CECILIA Brandt   III   Population Health/Accountable Care Organization  Office Phone: [phone]  [email or web address]

## 2023-08-16 ENCOUNTER — OFFICE VISIT (OUTPATIENT)
Dept: PRIMARY CARE | Facility: CLINIC | Age: 63
End: 2023-08-16
Payer: COMMERCIAL

## 2023-08-16 VITALS
SYSTOLIC BLOOD PRESSURE: 100 MMHG | HEART RATE: 62 BPM | BODY MASS INDEX: 27.08 KG/M2 | TEMPERATURE: 97.4 F | WEIGHT: 182.8 LBS | DIASTOLIC BLOOD PRESSURE: 60 MMHG | HEIGHT: 69 IN | OXYGEN SATURATION: 98 %

## 2023-08-16 DIAGNOSIS — E83.110 HEREDITARY HEMOCHROMATOSIS (CMS-HCC): ICD-10-CM

## 2023-08-16 DIAGNOSIS — K51.90 ULCERATIVE COLITIS WITHOUT COMPLICATIONS, UNSPECIFIED LOCATION (MULTI): ICD-10-CM

## 2023-08-16 DIAGNOSIS — S51.809S: ICD-10-CM

## 2023-08-16 DIAGNOSIS — G47.33 OSA ON CPAP: ICD-10-CM

## 2023-08-16 DIAGNOSIS — I10 ESSENTIAL HYPERTENSION: ICD-10-CM

## 2023-08-16 DIAGNOSIS — E11.9 TYPE 2 DIABETES MELLITUS WITHOUT COMPLICATION, WITHOUT LONG-TERM CURRENT USE OF INSULIN (MULTI): Primary | ICD-10-CM

## 2023-08-16 DIAGNOSIS — C61 ADENOCARCINOMA OF PROSTATE (MULTI): ICD-10-CM

## 2023-08-16 PROCEDURE — 3074F SYST BP LT 130 MM HG: CPT | Performed by: INTERNAL MEDICINE

## 2023-08-16 PROCEDURE — 1036F TOBACCO NON-USER: CPT | Performed by: INTERNAL MEDICINE

## 2023-08-16 PROCEDURE — 4010F ACE/ARB THERAPY RXD/TAKEN: CPT | Performed by: INTERNAL MEDICINE

## 2023-08-16 PROCEDURE — 3044F HG A1C LEVEL LT 7.0%: CPT | Performed by: INTERNAL MEDICINE

## 2023-08-16 PROCEDURE — 99214 OFFICE O/P EST MOD 30 MIN: CPT | Performed by: INTERNAL MEDICINE

## 2023-08-16 PROCEDURE — 3078F DIAST BP <80 MM HG: CPT | Performed by: INTERNAL MEDICINE

## 2023-08-16 RX ORDER — METFORMIN HYDROCHLORIDE 500 MG/1
500 TABLET ORAL
Qty: 180 TABLET | Refills: 0 | Status: SHIPPED | OUTPATIENT
Start: 2023-08-16 | End: 2023-08-16

## 2023-08-16 RX ORDER — SULFASALAZINE 500 MG/1
1000 TABLET ORAL 3 TIMES DAILY
COMMUNITY
End: 2023-12-14 | Stop reason: WASHOUT

## 2023-08-16 ASSESSMENT — ENCOUNTER SYMPTOMS
PSYCHIATRIC NEGATIVE: 1
ENDOCRINE NEGATIVE: 1
MUSCULOSKELETAL NEGATIVE: 1
CARDIOVASCULAR NEGATIVE: 1
GASTROINTESTINAL NEGATIVE: 1
EYES NEGATIVE: 1
ALLERGIC/IMMUNOLOGIC NEGATIVE: 1
NEUROLOGICAL NEGATIVE: 1
HEMATOLOGIC/LYMPHATIC NEGATIVE: 1
RESPIRATORY NEGATIVE: 1
CONSTITUTIONAL NEGATIVE: 1

## 2023-08-16 NOTE — PROGRESS NOTES
"Subjective   Patient ID: Kiko Petersen is a 62 y.o. male who presents for 3 month follow up .  Patient presents in follow up regarding hypertension.  Blood pressure has been well controlled on current therapy.  No adverse effects of medication reported.  Patient denies chest pain, palpitations, shortness of breath, orthopnea, fatigue or edema.    Patient presents in follow up re:  Type 2 diabetes.  Patient has been compliant with medical therapy as prescribed and mostly compliant with diet and exercise recommendations.  HgbA1c has been maintained at goal level.  No hypoglycemia reported and no other associated complaints.    Patient underwent left hip arthroplasty on 3/28 with good outcome.  He did have anemia post-op wth Hgb of 8.8, but has improved to 10.7 on recent lab.        Review of Systems   Constitutional: Negative.    HENT: Negative.     Eyes: Negative.    Respiratory: Negative.     Cardiovascular: Negative.    Gastrointestinal: Negative.    Endocrine: Negative.    Genitourinary: Negative.    Musculoskeletal: Negative.    Skin: Negative.    Allergic/Immunologic: Negative.    Neurological: Negative.    Hematological: Negative.    Psychiatric/Behavioral: Negative.         Objective     Blood pressure 100/60, pulse 62, temperature 36.3 °C (97.4 °F), temperature source Temporal, height 1.759 m (5' 9.25\"), weight 82.9 kg (182 lb 12.8 oz), SpO2 98 %.   Physical Exam  Constitutional:       Appearance: Normal appearance.   Neck:      Vascular: No carotid bruit.   Cardiovascular:      Rate and Rhythm: Normal rate and regular rhythm.      Pulses: Normal pulses.      Heart sounds: Normal heart sounds. No murmur heard.  Pulmonary:      Effort: Pulmonary effort is normal.      Breath sounds: Normal breath sounds. No wheezing or rales.   Abdominal:      General: Abdomen is flat. There is no distension.      Palpations: Abdomen is soft.      Tenderness: There is no abdominal tenderness.   Musculoskeletal:         " General: Normal range of motion.      Cervical back: Normal range of motion and neck supple.   Skin:     General: Skin is warm and dry.   Neurological:      General: No focal deficit present.      Mental Status: He is alert and oriented to person, place, and time.   Psychiatric:         Mood and Affect: Mood normal.         Behavior: Behavior normal.         Assessment/Plan   Problem List Items Addressed This Visit       Ulcerative colitis (CMS/HCC)    Type 2 diabetes mellitus without complication (CMS/HCC) - Primary    Relevant Medications    metFORMIN (Glucophage) 500 mg tablet    Other Relevant Orders    Hemoglobin A1C    Comprehensive Metabolic Panel    Lipid Panel    Adenocarcinoma of prostate (CMS/HCC)    BENNY on CPAP    Hereditary hemochromatosis (CMS/HCC)    Relevant Orders    CBC and Auto Differential    Essential hypertension    Relevant Orders    CBC and Auto Differential     Other Visit Diagnoses       Open wound of forearm, unspecified laterality, sequela        Relevant Orders    Referral to Wound Clinic          Sugars well controlled overall with A1c at goal.  Will continue present medical therapy and follow up.  BP well controlled on current therapy.  Advised to continue present therapy and follow.  Pt. continues to use CPAP nightly and is tolerating well.  Advised to continue treatment and will continue to follow clinically.   He is doing well s/p left hip arthroplasty.  He had colonoscopy in 8/2022.  He follows with Dr. Lyle re:  hx of prostate cancer.  He also follows with Hepatology for chronic liver issues.  He has been having issues with allergy Sx this spring.  He states that OTC therapy has not helped.  He is advised on using combination therapy with Claritin and Flonase in the generic forms.  Patient has wraps on both forearms which he states he had done in the hospital for wound coverings.  He was DC'd from the hospital with no recommendation for follow up with wound care.  Will  refer.    > 30 minutes was spent with the patient today, the majority of which was spent on review of history and medications as well as counselling on care plan and/or coordination of care.     Follow up in 3 months  Lab to be drawn 1 week prior to next office visit.

## 2023-08-22 ENCOUNTER — PATIENT OUTREACH (OUTPATIENT)
Dept: CARE COORDINATION | Facility: CLINIC | Age: 63
End: 2023-08-22

## 2023-08-22 NOTE — PROGRESS NOTES
[facility] SW outreach call to patient, left voicemail requesting return call.     CECILIA Brandt   III   Population Health/Accountable Care Organization  Office Phone: [phone]  [email or web address]

## 2023-08-29 ENCOUNTER — PATIENT OUTREACH (OUTPATIENT)
Dept: CARE COORDINATION | Facility: CLINIC | Age: 63
End: 2023-08-29

## 2023-08-30 ENCOUNTER — DOCUMENTATION (OUTPATIENT)
Dept: CARE COORDINATION | Facility: CLINIC | Age: 63
End: 2023-08-30

## 2023-09-12 ENCOUNTER — PATIENT OUTREACH (OUTPATIENT)
Dept: CARE COORDINATION | Facility: CLINIC | Age: 63
End: 2023-09-12

## 2023-09-12 NOTE — PROGRESS NOTES
Again attempted to contact Kiko.  Call went directly to .. Sequence for outreach is changed to monthly

## 2023-09-18 ENCOUNTER — PATIENT OUTREACH (OUTPATIENT)
Dept: CARE COORDINATION | Facility: CLINIC | Age: 63
End: 2023-09-18

## 2023-09-18 NOTE — PROGRESS NOTES
Patient has been identified to meet criteria for eligibility for the PCCL program. The  reached out to the patient regarding Personalized Care for Complex Lives to provide program information. Provided contact information and left voicemail.

## 2023-10-09 ENCOUNTER — PATIENT OUTREACH (OUTPATIENT)
Dept: CARE COORDINATION | Facility: CLINIC | Age: 63
End: 2023-10-09

## 2023-10-09 NOTE — PROGRESS NOTES
Patient has been identified to meet criteria for eligibility for the PCCL program. SW reached out to provide program information. LVM and contact information.

## 2023-10-10 RX ORDER — LISINOPRIL 20 MG/1
TABLET ORAL
Qty: 90 TABLET | Refills: 0 | OUTPATIENT
Start: 2023-10-10 | End: 2024-10-09

## 2023-10-12 ENCOUNTER — PATIENT OUTREACH (OUTPATIENT)
Dept: CARE COORDINATION | Facility: CLINIC | Age: 63
End: 2023-10-12

## 2023-10-12 NOTE — PROGRESS NOTES
Complex care management call placed and voice mail left.  Will dis enroll in program as all efforts to connect have been unsuccessful.  Kiko can always reach out to this CM @ [phone] if he chooses to re-engage

## 2023-10-30 PROBLEM — N40.1 BPH WITH OBSTRUCTION/LOWER URINARY TRACT SYMPTOMS: Status: ACTIVE | Noted: 2023-10-30

## 2023-10-30 PROBLEM — R35.0 INCREASED URINARY FREQUENCY: Status: ACTIVE | Noted: 2023-10-30

## 2023-10-30 PROBLEM — M25.552 LEFT HIP PAIN: Status: ACTIVE | Noted: 2023-10-30

## 2023-10-30 PROBLEM — H25.813 COMBINED FORM OF AGE-RELATED CATARACT, BOTH EYES: Status: ACTIVE | Noted: 2023-03-28

## 2023-10-30 PROBLEM — R06.02 SHORTNESS OF BREATH ON EXERTION: Status: ACTIVE | Noted: 2023-10-30

## 2023-10-30 PROBLEM — H52.209 ASTIGMATISM: Status: ACTIVE | Noted: 2023-03-28

## 2023-10-30 PROBLEM — R60.0 LEG EDEMA: Status: ACTIVE | Noted: 2023-10-30

## 2023-10-30 PROBLEM — H25.12 AGE-RELATED NUCLEAR CATARACT, LEFT: Status: ACTIVE | Noted: 2023-10-30

## 2023-10-30 PROBLEM — R94.31 ABNORMAL ECG: Status: ACTIVE | Noted: 2023-10-30

## 2023-10-30 PROBLEM — D50.0 ANEMIA DUE TO BLOOD LOSS: Status: ACTIVE | Noted: 2023-10-30

## 2023-10-30 PROBLEM — R18.8 ASCITES: Status: ACTIVE | Noted: 2023-10-30

## 2023-10-30 PROBLEM — M25.562 LEFT KNEE PAIN: Status: ACTIVE | Noted: 2023-10-30

## 2023-10-30 PROBLEM — I49.9 IRREGULAR HEART RHYTHM: Status: ACTIVE | Noted: 2023-10-30

## 2023-10-30 PROBLEM — H25.11 AGE-RELATED NUCLEAR CATARACT, RIGHT: Status: ACTIVE | Noted: 2023-10-30

## 2023-10-30 PROBLEM — I48.0 PAROXYSMAL ATRIAL FIBRILLATION (MULTI): Status: ACTIVE | Noted: 2023-03-28

## 2023-10-30 PROBLEM — R09.82 POST-NASAL DRAINAGE: Status: ACTIVE | Noted: 2023-10-30

## 2023-10-30 PROBLEM — M16.12 OSTEOARTHRITIS OF LEFT HIP: Status: ACTIVE | Noted: 2023-10-30

## 2023-10-30 PROBLEM — Z96.642 PRESENCE OF LEFT ARTIFICIAL HIP JOINT: Status: ACTIVE | Noted: 2023-03-28

## 2023-10-30 PROBLEM — Z85.46 PERSONAL HISTORY OF MALIGNANT NEOPLASM OF PROSTATE: Status: ACTIVE | Noted: 2023-03-28

## 2023-10-30 PROBLEM — E87.1 HYPO-OSMOLALITY AND HYPONATREMIA: Status: ACTIVE | Noted: 2023-03-28

## 2023-10-30 PROBLEM — Z79.01 LONG TERM (CURRENT) USE OF ANTICOAGULANTS: Status: ACTIVE | Noted: 2023-03-28

## 2023-10-30 PROBLEM — N13.8 BPH WITH OBSTRUCTION/LOWER URINARY TRACT SYMPTOMS: Status: ACTIVE | Noted: 2023-10-30

## 2023-10-30 PROBLEM — B35.4 TINEA CORPORIS: Status: ACTIVE | Noted: 2023-10-30

## 2023-10-30 PROBLEM — R97.20 ELEVATED PSA: Status: ACTIVE | Noted: 2023-10-30

## 2023-10-30 PROBLEM — R29.818 SUSPECTED SLEEP APNEA: Status: ACTIVE | Noted: 2023-10-30

## 2023-10-30 PROBLEM — N43.3 BILATERAL HYDROCELE: Status: ACTIVE | Noted: 2023-10-30

## 2023-10-30 PROBLEM — K74.60 CIRRHOSIS (MULTI): Status: ACTIVE | Noted: 2023-10-30

## 2023-10-30 PROBLEM — R74.8 ELEVATED LIVER ENZYMES: Status: ACTIVE | Noted: 2023-10-30

## 2023-10-30 PROBLEM — N13.9 OBSTRUCTIVE AND REFLUX UROPATHY: Status: ACTIVE | Noted: 2023-03-28

## 2023-10-30 PROBLEM — I48.91 ATRIAL FIBRILLATION, NEW ONSET (MULTI): Status: ACTIVE | Noted: 2023-10-30

## 2023-10-30 PROBLEM — K70.31 ALCOHOLIC CIRRHOSIS OF LIVER WITH ASCITES (MULTI): Status: ACTIVE | Noted: 2023-03-28

## 2023-10-30 PROBLEM — K76.6 PORTAL HYPERTENSION (MULTI): Status: ACTIVE | Noted: 2023-10-30

## 2023-10-30 PROBLEM — H52.10 MYOPIA, UNSPECIFIED EYE: Status: ACTIVE | Noted: 2023-03-28

## 2023-10-30 PROBLEM — E87.70 VOLUME OVERLOAD: Status: ACTIVE | Noted: 2023-10-30

## 2023-10-30 PROBLEM — F10.90 ALCOHOL USE DISORDER: Status: ACTIVE | Noted: 2023-10-30

## 2023-10-30 PROBLEM — D62 ACUTE POSTHEMORRHAGIC ANEMIA: Status: ACTIVE | Noted: 2023-03-28

## 2023-10-30 PROBLEM — Z47.1 AFTERCARE FOLLOWING JOINT REPLACEMENT SURGERY: Status: ACTIVE | Noted: 2023-03-28

## 2023-10-30 PROBLEM — Z96.642 STATUS POST LEFT HIP REPLACEMENT: Status: ACTIVE | Noted: 2023-10-30

## 2023-10-30 PROBLEM — S39.012A LUMBAR STRAIN: Status: ACTIVE | Noted: 2023-10-30

## 2023-10-30 PROBLEM — Z79.84 LONG TERM (CURRENT) USE OF ORAL HYPOGLYCEMIC DRUGS: Status: ACTIVE | Noted: 2023-03-28

## 2023-10-30 PROBLEM — H52.4 PRESBYOPIA: Status: ACTIVE | Noted: 2023-03-28

## 2023-10-30 PROBLEM — Z96.642 H/O TOTAL HIP ARTHROPLASTY, LEFT: Status: ACTIVE | Noted: 2023-10-30

## 2023-10-30 PROBLEM — R00.0 TACHYCARDIA: Status: ACTIVE | Noted: 2023-10-30

## 2023-10-30 PROBLEM — M15.9 POLYOSTEOARTHRITIS, UNSPECIFIED: Status: ACTIVE | Noted: 2023-03-28

## 2023-11-11 DIAGNOSIS — I48.0 PAROXYSMAL ATRIAL FIBRILLATION (MULTI): Primary | ICD-10-CM

## 2023-11-13 ENCOUNTER — PHARMACY VISIT (OUTPATIENT)
Dept: PHARMACY | Facility: CLINIC | Age: 63
End: 2023-11-13
Payer: COMMERCIAL

## 2023-11-13 ENCOUNTER — TELEPHONE (OUTPATIENT)
Dept: PRIMARY CARE | Facility: CLINIC | Age: 63
End: 2023-11-13

## 2023-11-13 PROCEDURE — RXMED WILLOW AMBULATORY MEDICATION CHARGE

## 2023-11-13 RX ORDER — FUROSEMIDE 40 MG/1
TABLET ORAL
Qty: 30 TABLET | Refills: 0 | OUTPATIENT
Start: 2023-11-13 | End: 2024-11-12

## 2023-11-13 RX ORDER — LISINOPRIL 20 MG/1
TABLET ORAL
Qty: 90 TABLET | Refills: 0 | OUTPATIENT
Start: 2023-11-13 | End: 2024-11-12

## 2023-11-13 NOTE — TELEPHONE ENCOUNTER
Rx Refill Request Telephone Encounter    Name:  Kiko Petersen  :  [id number]  Medication Name:  lisinopril   20 mg  Route : oral  Frequency : daily  Quantity : 90    Specific Pharmacy location:  [facility] pharmacy  Date of last appointment:  Aug 16  Date of next appointment:  Dec 1  Best number to reach patient:  [phone]

## 2023-11-14 ENCOUNTER — APPOINTMENT (OUTPATIENT)
Dept: PRIMARY CARE | Facility: CLINIC | Age: 63
End: 2023-11-14

## 2023-11-14 ENCOUNTER — PHARMACY VISIT (OUTPATIENT)
Dept: PHARMACY | Facility: CLINIC | Age: 63
End: 2023-11-14
Payer: COMMERCIAL

## 2023-11-14 PROCEDURE — RXMED WILLOW AMBULATORY MEDICATION CHARGE

## 2023-11-24 ENCOUNTER — LAB (OUTPATIENT)
Dept: LAB | Facility: LAB | Age: 63
End: 2023-11-24
Payer: COMMERCIAL

## 2023-11-24 DIAGNOSIS — E83.110 HEREDITARY HEMOCHROMATOSIS (CMS-HCC): ICD-10-CM

## 2023-11-24 DIAGNOSIS — I10 ESSENTIAL HYPERTENSION: ICD-10-CM

## 2023-11-24 DIAGNOSIS — E11.9 TYPE 2 DIABETES MELLITUS WITHOUT COMPLICATION, WITHOUT LONG-TERM CURRENT USE OF INSULIN (MULTI): ICD-10-CM

## 2023-11-24 LAB
ALBUMIN SERPL BCP-MCNC: 3.6 G/DL (ref 3.4–5)
ALP SERPL-CCNC: 132 U/L (ref 33–136)
ALT SERPL W P-5'-P-CCNC: 14 U/L (ref 10–52)
ANION GAP SERPL CALC-SCNC: 15 MMOL/L (ref 10–20)
AST SERPL W P-5'-P-CCNC: 34 U/L (ref 9–39)
BASOPHILS # BLD AUTO: 0.04 X10*3/UL (ref 0–0.1)
BASOPHILS NFR BLD AUTO: 1.3 %
BILIRUB SERPL-MCNC: 0.4 MG/DL (ref 0–1.2)
BUN SERPL-MCNC: 7 MG/DL (ref 6–23)
CALCIUM SERPL-MCNC: 8.7 MG/DL (ref 8.6–10.6)
CHLORIDE SERPL-SCNC: 97 MMOL/L (ref 98–107)
CHOLEST SERPL-MCNC: 144 MG/DL (ref 0–199)
CHOLESTEROL/HDL RATIO: 2
CO2 SERPL-SCNC: 29 MMOL/L (ref 21–32)
CREAT SERPL-MCNC: 0.69 MG/DL (ref 0.5–1.3)
EOSINOPHIL # BLD AUTO: 0.04 X10*3/UL (ref 0–0.7)
EOSINOPHIL NFR BLD AUTO: 1.3 %
ERYTHROCYTE [DISTWIDTH] IN BLOOD BY AUTOMATED COUNT: 15.4 % (ref 11.5–14.5)
EST. AVERAGE GLUCOSE BLD GHB EST-MCNC: 131 MG/DL
GFR SERPL CREATININE-BSD FRML MDRD: >90 ML/MIN/1.73M*2
GLUCOSE SERPL-MCNC: 98 MG/DL (ref 74–99)
HBA1C MFR BLD: 6.2 %
HCT VFR BLD AUTO: 35.6 % (ref 41–52)
HDLC SERPL-MCNC: 72.9 MG/DL
HGB BLD-MCNC: 11.3 G/DL (ref 13.5–17.5)
IMM GRANULOCYTES # BLD AUTO: 0.01 X10*3/UL (ref 0–0.7)
IMM GRANULOCYTES NFR BLD AUTO: 0.3 % (ref 0–0.9)
LDLC SERPL CALC-MCNC: 55 MG/DL
LYMPHOCYTES # BLD AUTO: 0.93 X10*3/UL (ref 1.2–4.8)
LYMPHOCYTES NFR BLD AUTO: 31 %
MCH RBC QN AUTO: 28.4 PG (ref 26–34)
MCHC RBC AUTO-ENTMCNC: 31.7 G/DL (ref 32–36)
MCV RBC AUTO: 89 FL (ref 80–100)
MONOCYTES # BLD AUTO: 0.49 X10*3/UL (ref 0.1–1)
MONOCYTES NFR BLD AUTO: 16.3 %
NEUTROPHILS # BLD AUTO: 1.49 X10*3/UL (ref 1.2–7.7)
NEUTROPHILS NFR BLD AUTO: 49.8 %
NON HDL CHOLESTEROL: 71 MG/DL (ref 0–149)
NRBC BLD-RTO: 0 /100 WBCS (ref 0–0)
PLATELET # BLD AUTO: 151 X10*3/UL (ref 150–450)
POTASSIUM SERPL-SCNC: 3.8 MMOL/L (ref 3.5–5.3)
PROT SERPL-MCNC: 6.8 G/DL (ref 6.4–8.2)
RBC # BLD AUTO: 3.98 X10*6/UL (ref 4.5–5.9)
SODIUM SERPL-SCNC: 137 MMOL/L (ref 136–145)
TRIGL SERPL-MCNC: 82 MG/DL (ref 0–149)
VLDL: 16 MG/DL (ref 0–40)
WBC # BLD AUTO: 3 X10*3/UL (ref 4.4–11.3)

## 2023-11-24 PROCEDURE — 85025 COMPLETE CBC W/AUTO DIFF WBC: CPT

## 2023-11-24 PROCEDURE — 83036 HEMOGLOBIN GLYCOSYLATED A1C: CPT

## 2023-11-24 PROCEDURE — 36415 COLL VENOUS BLD VENIPUNCTURE: CPT

## 2023-11-24 PROCEDURE — 80053 COMPREHEN METABOLIC PANEL: CPT

## 2023-11-24 PROCEDURE — 80061 LIPID PANEL: CPT

## 2023-11-28 ENCOUNTER — APPOINTMENT (OUTPATIENT)
Dept: HEMATOLOGY/ONCOLOGY | Facility: CLINIC | Age: 63
End: 2023-11-28
Payer: COMMERCIAL

## 2023-12-01 ENCOUNTER — APPOINTMENT (OUTPATIENT)
Dept: PRIMARY CARE | Facility: CLINIC | Age: 63
End: 2023-12-01

## 2023-12-06 ENCOUNTER — APPOINTMENT (OUTPATIENT)
Dept: PRIMARY CARE | Facility: CLINIC | Age: 63
End: 2023-12-06

## 2023-12-06 DIAGNOSIS — K51.90 ULCERATIVE COLITIS WITHOUT COMPLICATIONS, UNSPECIFIED LOCATION (MULTI): ICD-10-CM

## 2023-12-06 DIAGNOSIS — K70.31 ALCOHOLIC CIRRHOSIS OF LIVER WITH ASCITES (MULTI): ICD-10-CM

## 2023-12-06 PROCEDURE — RXMED WILLOW AMBULATORY MEDICATION CHARGE

## 2023-12-06 RX ORDER — LISINOPRIL 20 MG/1
TABLET ORAL
Qty: 90 TABLET | Refills: 0 | OUTPATIENT
Start: 2023-12-06 | End: 2024-12-05

## 2023-12-06 RX ORDER — FUROSEMIDE 40 MG/1
20 TABLET ORAL DAILY
Qty: 90 TABLET | Refills: 0 | Status: SHIPPED | OUTPATIENT
Start: 2023-12-06

## 2023-12-06 RX ORDER — FOLIC ACID 1 MG/1
1 TABLET ORAL DAILY
Qty: 90 TABLET | Refills: 0 | Status: SHIPPED | OUTPATIENT
Start: 2023-12-06 | End: 2024-12-04

## 2023-12-07 ENCOUNTER — PHARMACY VISIT (OUTPATIENT)
Dept: PHARMACY | Facility: CLINIC | Age: 63
End: 2023-12-07
Payer: COMMERCIAL

## 2023-12-14 ENCOUNTER — OFFICE VISIT (OUTPATIENT)
Dept: PRIMARY CARE | Facility: CLINIC | Age: 63
End: 2023-12-14
Payer: COMMERCIAL

## 2023-12-14 VITALS
TEMPERATURE: 97.6 F | OXYGEN SATURATION: 98 % | BODY MASS INDEX: 28.38 KG/M2 | DIASTOLIC BLOOD PRESSURE: 60 MMHG | SYSTOLIC BLOOD PRESSURE: 138 MMHG | HEART RATE: 79 BPM | WEIGHT: 193.6 LBS

## 2023-12-14 DIAGNOSIS — E83.110 HEREDITARY HEMOCHROMATOSIS (CMS-HCC): ICD-10-CM

## 2023-12-14 DIAGNOSIS — I48.0 PAROXYSMAL ATRIAL FIBRILLATION (MULTI): ICD-10-CM

## 2023-12-14 DIAGNOSIS — C61 ADENOCARCINOMA OF PROSTATE (MULTI): ICD-10-CM

## 2023-12-14 DIAGNOSIS — Z00.00 MEDICARE ANNUAL WELLNESS VISIT, INITIAL: Primary | ICD-10-CM

## 2023-12-14 DIAGNOSIS — K51.90 ULCERATIVE COLITIS WITHOUT COMPLICATIONS, UNSPECIFIED LOCATION (MULTI): ICD-10-CM

## 2023-12-14 DIAGNOSIS — K76.6 PORTAL HYPERTENSION (MULTI): ICD-10-CM

## 2023-12-14 DIAGNOSIS — I10 ESSENTIAL HYPERTENSION: ICD-10-CM

## 2023-12-14 DIAGNOSIS — E11.9 TYPE 2 DIABETES MELLITUS WITHOUT COMPLICATION, WITHOUT LONG-TERM CURRENT USE OF INSULIN (MULTI): ICD-10-CM

## 2023-12-14 DIAGNOSIS — G47.33 OSA ON CPAP: ICD-10-CM

## 2023-12-14 PROCEDURE — 1159F MED LIST DOCD IN RCRD: CPT | Performed by: INTERNAL MEDICINE

## 2023-12-14 PROCEDURE — 1170F FXNL STATUS ASSESSED: CPT | Performed by: INTERNAL MEDICINE

## 2023-12-14 PROCEDURE — 1036F TOBACCO NON-USER: CPT | Performed by: INTERNAL MEDICINE

## 2023-12-14 PROCEDURE — 4010F ACE/ARB THERAPY RXD/TAKEN: CPT | Performed by: INTERNAL MEDICINE

## 2023-12-14 PROCEDURE — 99214 OFFICE O/P EST MOD 30 MIN: CPT | Performed by: INTERNAL MEDICINE

## 2023-12-14 PROCEDURE — G0438 PPPS, INITIAL VISIT: HCPCS | Performed by: INTERNAL MEDICINE

## 2023-12-14 PROCEDURE — 3075F SYST BP GE 130 - 139MM HG: CPT | Performed by: INTERNAL MEDICINE

## 2023-12-14 PROCEDURE — 3078F DIAST BP <80 MM HG: CPT | Performed by: INTERNAL MEDICINE

## 2023-12-14 PROCEDURE — 3048F LDL-C <100 MG/DL: CPT | Performed by: INTERNAL MEDICINE

## 2023-12-14 PROCEDURE — 3044F HG A1C LEVEL LT 7.0%: CPT | Performed by: INTERNAL MEDICINE

## 2023-12-14 PROCEDURE — RXMED WILLOW AMBULATORY MEDICATION CHARGE

## 2023-12-14 RX ORDER — METFORMIN HYDROCHLORIDE 500 MG/1
500 TABLET ORAL
Qty: 180 TABLET | Refills: 0 | Status: SHIPPED | OUTPATIENT
Start: 2023-12-14 | End: 2024-03-14 | Stop reason: SDUPTHER

## 2023-12-14 RX ORDER — LISINOPRIL 20 MG/1
20 TABLET ORAL DAILY
Qty: 90 TABLET | Refills: 0 | Status: SHIPPED | OUTPATIENT
Start: 2023-12-14 | End: 2024-03-14 | Stop reason: SDUPTHER

## 2023-12-14 RX ORDER — NIFEDIPINE 60 MG/1
60 TABLET, FILM COATED, EXTENDED RELEASE ORAL DAILY
Qty: 90 TABLET | Refills: 0 | Status: SHIPPED | OUTPATIENT
Start: 2023-12-14 | End: 2024-03-14 | Stop reason: SDUPTHER

## 2023-12-14 ASSESSMENT — ENCOUNTER SYMPTOMS
HEMATOLOGIC/LYMPHATIC NEGATIVE: 1
PSYCHIATRIC NEGATIVE: 1
LOSS OF SENSATION IN FEET: 0
OCCASIONAL FEELINGS OF UNSTEADINESS: 1
DEPRESSION: 0
GASTROINTESTINAL NEGATIVE: 1
RESPIRATORY NEGATIVE: 1
CARDIOVASCULAR NEGATIVE: 1
ALLERGIC/IMMUNOLOGIC NEGATIVE: 1
EYES NEGATIVE: 1
CONSTITUTIONAL NEGATIVE: 1
NEUROLOGICAL NEGATIVE: 1
MUSCULOSKELETAL NEGATIVE: 1
ENDOCRINE NEGATIVE: 1

## 2023-12-14 ASSESSMENT — ACTIVITIES OF DAILY LIVING (ADL)
GROCERY_SHOPPING: INDEPENDENT
TAKING_MEDICATION: INDEPENDENT
BATHING: INDEPENDENT
DRESSING: INDEPENDENT
DOING_HOUSEWORK: INDEPENDENT

## 2023-12-14 NOTE — PROGRESS NOTES
Subjective   Patient ID: Kiko Petersen is a 63 y.o. male who presents for medicare.  Patient presents in follow up regarding hypertension.  Blood pressure has been well controlled on current therapy.  No adverse effects of medication reported.  Patient denies chest pain, palpitations, shortness of breath, orthopnea, fatigue or edema.    Patient presents in follow up re:  Type 2 diabetes.  Patient has been compliant with medical therapy as prescribed and mostly compliant with diet and exercise recommendations.  HgbA1c has been maintained at goal level.  No hypoglycemia reported and no other associated complaints.    Patient underwent left hip arthroplasty on 3/28 with good outcome.  He did have anemia post-op wth Hgb of 8.8, but has improved to 10.7 on recent lab.        Review of Systems   Constitutional: Negative.    HENT: Negative.     Eyes: Negative.    Respiratory: Negative.     Cardiovascular: Negative.    Gastrointestinal: Negative.    Endocrine: Negative.    Genitourinary: Negative.    Musculoskeletal: Negative.    Skin: Negative.    Allergic/Immunologic: Negative.    Neurological: Negative.    Hematological: Negative.    Psychiatric/Behavioral: Negative.         Objective     Blood pressure 138/60, pulse 79, temperature 36.4 °C (97.6 °F), temperature source Temporal, weight 87.8 kg (193 lb 9.6 oz), SpO2 98 %.   Physical Exam  Constitutional:       Appearance: Normal appearance.   Neck:      Vascular: No carotid bruit.   Cardiovascular:      Rate and Rhythm: Normal rate. Rhythm irregular.      Pulses: Normal pulses.      Heart sounds: Normal heart sounds. No murmur heard.  Pulmonary:      Effort: Pulmonary effort is normal.      Breath sounds: Normal breath sounds. No wheezing or rales.   Abdominal:      General: Abdomen is flat. There is no distension.      Palpations: Abdomen is soft.      Tenderness: There is no abdominal tenderness.   Musculoskeletal:         General: Normal range of motion.      Cervical  back: Normal range of motion and neck supple.   Skin:     General: Skin is warm and dry.   Neurological:      General: No focal deficit present.      Mental Status: He is alert and oriented to person, place, and time.   Psychiatric:         Mood and Affect: Mood normal.         Behavior: Behavior normal.         Assessment/Plan   Problem List Items Addressed This Visit       Ulcerative colitis (CMS/HCC)    Relevant Orders    CBC and Auto Differential    Type 2 diabetes mellitus without complication (CMS/HCC)    Relevant Medications    metFORMIN (Glucophage) 500 mg tablet    Other Relevant Orders    Hemoglobin A1C    Comprehensive Metabolic Panel    Lipid Panel    Adenocarcinoma of prostate (CMS/HCC)    BENNY on CPAP    Hereditary hemochromatosis (CMS/HCC)    Essential hypertension    Relevant Medications    lisinopril 20 mg tablet    NIFEdipine ER (NIFEdipine CC) 60 mg 24 hr tablet    Other Relevant Orders    Comprehensive Metabolic Panel    CBC and Auto Differential    Paroxysmal atrial fibrillation (CMS/HCC)    Relevant Medications    NIFEdipine ER (NIFEdipine CC) 60 mg 24 hr tablet    Portal hypertension (CMS/HCC)     Other Visit Diagnoses       Medicare annual wellness visit, initial    -  Primary          Medicare annual wellness completed with no new findings or issues identified.  Patient does not have advanced care planning in place.  This is discussed and form is given today  Sugars well controlled overall with A1c at goal.  Will continue present medical therapy and follow up.  BP well controlled on current therapy.  Advised to continue present therapy and follow.  It is noted that patient has been off lisinopril due to being out of it and the physician who had previously prescribed refusing to refill.  He continues to follow with Cardiology re:  anne of a-fib.  He last saw Cardio in March at which time he was in NSR as he was when I saw in August.  He c/o intermittent days of feeling that his legs are weak  when walking.  I note that rhythm is irregular today and I suspect that his leg weakness is due to intermittent a-fib.  I recommend calling to see Dr. Burton sooner than anticipated which was scheduled for March.  He follows with GI re:  ulcerative colitis and hepatic pathology.  Pt. continues to use CPAP nightly and is tolerating well.  Advised to continue treatment and will continue to follow clinically.   He is doing well s/p left hip arthroplasty.  He had colonoscopy in 8/2022.  He follows with Dr. Lyle re:  hx of prostate cancer.  He also follows with Hepatology for chronic liver issues.  He has been having issues with allergy Sx this spring.  He states that OTC therapy has not helped.  He is advised on using combination therapy with Claritin and Flonase in the generic forms.      > 30 minutes was spent with the patient today, the majority of which was spent on review of history and medications as well as counselling on care plan and/or coordination of care.     Follow up in 3 months  Lab to be drawn 1 week prior to next office visit.

## 2023-12-18 ENCOUNTER — PHARMACY VISIT (OUTPATIENT)
Dept: PHARMACY | Facility: CLINIC | Age: 63
End: 2023-12-18
Payer: COMMERCIAL

## 2023-12-19 NOTE — PROGRESS NOTES
FUV    Last seen - 12/15/23     HISTORY OF PRESENT ILLNESS:   Kiko Petersen is a 63 y.o. male who is being seen today for Psa results and follow up.    PAST MEDICAL HISTORY:  Past Medical History:   Diagnosis Date    Encounter for screening for malignant neoplasm of colon 07/30/2019    Encounter for screening colonoscopy    Unspecified cataract     Cataract, right eye    Urinary tract infection, site not specified 03/02/2020    UTI (urinary tract infection), bacterial   - history of BPH  - elevated PSA with PI-RADS 3 on MRI 03/20/19  - Maximo 3+3=6 prostate cancer Dx on 4/19/19, with PI-RADS 3 on MRI 03/27/2020 and ASAP on restaging on prostate biopsy 05/07/2020  - On active surveillance    PAST SURGICAL HISTORY:  Past Surgical History:   Procedure Laterality Date    EXCISION / REPAIR HYDROCELE PEDIATRIC      HERNIA REPAIR  10/14/2015    Inguinal Hernia Repair    REPLACEMENT TOTAL HIP LATERAL POSITION Left         ALLERGIES:   Allergies   Allergen Reactions    Penicillins Other, Swelling, Unknown and Anaphylaxis     Cefazolin tolerated for post-op prophylaxis following ortho procedure    Erythromycin Diarrhea        MEDICATIONS:   Current Outpatient Medications   Medication Instructions    apixaban (Eliquis) 5 mg tablet TAKE 1 TABLET BY MOUTH TWO TIMES A DAY    folic acid (Folvite) 1 mg tablet TAKE 1 TABLET BY MOUTH ONCE DAILY    ketoconazole (NIZOral) 2 % cream Topical    Klor-Con M20 20 mEq ER tablet 1 tablet, oral, Daily    Lactobacillus acidophilus (Acidophilus) capsule 1 capsule, oral, Daily    Lasix 20 mg, oral, Daily    lisinopril 20 mg, oral, Daily    magnesium oxide (Mag-Ox) 400 mg (241.3 mg magnesium) tablet 1 tablet, oral, Daily    metFORMIN (Glucophage) 500 mg tablet TAKE 1 TABLET BY MOUTH TWO TIMES A DAY WITH MEALS    metoprolol tartrate (Lopressor) 50 mg tablet TAKE 1 TABLET BY MOUTH EVERY 12 HOURS    naproxen sodium (Aleve) 220 mg tablet oral, Every 12 hours PRN    NIFEdipine ER (ADALAT CC) 60 mg,  "oral, Daily    spironolactone (ALDACTONE) 50 mg, oral, Daily    sulfaSALAzine (Azulfidine) 500 mg tablet TAKE 2 TABLETS BY MOUTH THREE TIMES A DAY    tamsulosin (Flomax) 0.4 mg 24 hr capsule TAKE 1 CAPSULE BY MOUTH ONCE DAILY        PHYSICAL EXAM:  There were no vitals taken for this visit.  Constitutional: Patient appears well-developed and well-nourished. No distress.    Pulmonary/Chest: Effort normal. No respiratory distress.   Abdominal: Soft, ND NT  : WNL  Musculoskeletal: Normal range of motion.    Neurological: Alert and oriented to person, place, and time.  Psychiatric: Normal mood and affect. Behavior is normal. Thought content normal.      Labs:  No results found for: \"TESTOSTERONE\"  Lab Results   Component Value Date    PSA 5.18 (H) 12/10/2022     No components found for: \"CBC\"  Lab Results   Component Value Date    CREATININE 0.69 11/24/2023     No components found for: \"TESTOTMS\"  No results found for: \"TESTF\"    Imaging:  - MRI of prostate on 2/23/22 demonstrated  Unchanged PI rads 3 lesion in the left lateral transitional zone of the mid to apex of the prostate compared to 2020 MRI, as described and PI RADS 2 changes.  - Results reviewed with patient. Patient reassured.     Discussion:  Doing well, no complaints. Denies any dysuria, hematuria, bothersome urinary frequency, urgency, or flank pain. Patient denies any pushing or straining to urinate. Feels he is fully emptying bladder. He has ED but it is not a priority at this time. Will plan for PSA now and will call him with results. Follow up in 6 months with PSA prior.    Assessment:    No diagnosis found.    Kiko Petersen is a 63 y.o. male here for FUV     Plan:   1) Will plan for PSA now and will call him with results.   2) Follow up in 6 months with PSA prior.  3) All questions and concerns were addressed. Patient verbalizes understanding and has no other questions at this time.     Scribe Attestation:  By signing my name below, I Liss " Charlie French   attest that this documentation has been prepared under the direction and in the presence of René Lyle MD.

## 2024-01-05 ENCOUNTER — OFFICE VISIT (OUTPATIENT)
Dept: CARDIOLOGY | Facility: CLINIC | Age: 64
End: 2024-01-05
Payer: MEDICARE

## 2024-01-05 VITALS
HEART RATE: 76 BPM | SYSTOLIC BLOOD PRESSURE: 122 MMHG | HEIGHT: 70 IN | WEIGHT: 192 LBS | BODY MASS INDEX: 27.49 KG/M2 | DIASTOLIC BLOOD PRESSURE: 78 MMHG

## 2024-01-05 DIAGNOSIS — I48.91 ATRIAL FIBRILLATION, NEW ONSET (MULTI): Primary | ICD-10-CM

## 2024-01-05 PROCEDURE — 1159F MED LIST DOCD IN RCRD: CPT | Performed by: INTERNAL MEDICINE

## 2024-01-05 PROCEDURE — 1036F TOBACCO NON-USER: CPT | Performed by: INTERNAL MEDICINE

## 2024-01-05 PROCEDURE — 99213 OFFICE O/P EST LOW 20 MIN: CPT | Performed by: INTERNAL MEDICINE

## 2024-01-05 PROCEDURE — 93000 ELECTROCARDIOGRAM COMPLETE: CPT | Performed by: INTERNAL MEDICINE

## 2024-01-05 PROCEDURE — 1160F RVW MEDS BY RX/DR IN RCRD: CPT | Performed by: INTERNAL MEDICINE

## 2024-01-05 PROCEDURE — 3074F SYST BP LT 130 MM HG: CPT | Performed by: INTERNAL MEDICINE

## 2024-01-05 PROCEDURE — 4010F ACE/ARB THERAPY RXD/TAKEN: CPT | Performed by: INTERNAL MEDICINE

## 2024-01-05 PROCEDURE — 3078F DIAST BP <80 MM HG: CPT | Performed by: INTERNAL MEDICINE

## 2024-01-05 ASSESSMENT — ENCOUNTER SYMPTOMS
DEPRESSION: 0
OCCASIONAL FEELINGS OF UNSTEADINESS: 1
LOSS OF SENSATION IN FEET: 0

## 2024-01-05 NOTE — PATIENT INSTRUCTIONS
Continue all current medications as prescribed.   Dr. Whelan has ordered a heart monitor to assess your heart rhythm.  Dr. Whelan has placed a referral to Dr. Burton to arrange followup to discuss further management of a-fib.  Followup with Dr. Whelan in 3 months.     If you have any questions or cardiac concerns, please call our office at [phone].

## 2024-01-05 NOTE — LETTER
January 5, 2024     Paolo Rogers MD  [address]  [facility], [address] [place] [id number]    Patient: Kiko Petersen   YOB: 1960   Date of Visit: 1/5/2024       Dear Dr. Paolo Rogers MD:    Thank you for referring Kiko Petersen to me for evaluation. Below are my notes for this consultation.  If you have questions, please do not hesitate to call me. I look forward to following your patient along with you.       Sincerely,     Bashir Whelan MD      CC: No Recipients  ______________________________________________________________________________________    Referred by Dr. Paolo Rogers for Atrial Fibrillation.    Counseling:  The patient was counseled regarding diagnostic results, instructions for management, risk factor reductions, prognosis, patient and family education, impressions, risks and benefits of treatment options and importance of compliance with treatment.       History Of Present Illness:    Kiko Petersen is a 63 y.o. male patient whose PMH is significant for HTN, atrial fibrillation, DM type 2, BPH with adenocarcinoma of the prostate, and BENNY (CPAP). He presents today to establish cardiovascular care for the evaluation and management of a-fib. Echocardiogram performed 07/25/2023 demonstrated an EF of 70%, left ventricular concentric remodeling, mildly dilated aortic root. The patient reports a longstanding history of a-fib that is followed by Dr. Burton; however, at his most recent followup with his PCP he was found to be in a-fib and he felt that he should not wait until his next scheduled EP followup in 03/2024. He has been asymptomatic, denying any CP, chest discomfort, SOB or palpitations. He is only aware of a-fib if he receives an alert from his Apple Watch. EKG today shows NSR. The patient is compliant with his prescribed medications     Past Surgical History:  He has a past surgical history that includes Hernia repair (10/14/2015); Excision / repair  hydrocele pediatric; and Replacement total hip lateral position (Left).      Social History:  He reports that he quit smoking about 44 years ago. His smoking use included cigarettes. He started smoking about 45 years ago. He has a 0.01 pack-year smoking history. He has never used smokeless tobacco. He reports current alcohol use. He reports that he does not use drugs.    Family History:  Family History   Problem Relation Name Age of Onset   • Hypertension Mother     • Emphysema Mother     • Lung cancer Father     • Hypertension Father     • Hypertension Brother     • Heart attack Brother     • Breast cancer Mother's Sister     • Hypertension Mother's Sister     • Emphysema Mother's Sister     • Colon cancer Father's Sister     • Hypertension Father's Sister     • Hypertension Father's Brother     • Hypertension Maternal Grandmother     • Hypertension Maternal Grandfather     • Heart attack Maternal Grandfather          Allergies:  Penicillins and Erythromycin    Outpatient Medications:  Current Outpatient Medications   Medication Instructions   • apixaban (Eliquis) 5 mg tablet TAKE 1 TABLET BY MOUTH TWO TIMES A DAY   • folic acid (Folvite) 1 mg tablet TAKE 1 TABLET BY MOUTH ONCE DAILY   • ketoconazole (NIZOral) 2 % cream Topical   • Klor-Con M20 20 mEq ER tablet 1 tablet, oral, Daily   • Lactobacillus acidophilus (Acidophilus) capsule 1 capsule, oral, Daily   • Lasix 20 mg, oral, Daily   • lisinopril 20 mg, oral, Daily   • magnesium oxide (Mag-Ox) 400 mg (241.3 mg magnesium) tablet 1 tablet, oral, Daily   • metFORMIN (Glucophage) 500 mg tablet TAKE 1 TABLET BY MOUTH TWO TIMES A DAY WITH MEALS   • metoprolol tartrate (Lopressor) 50 mg tablet TAKE 1 TABLET BY MOUTH EVERY 12 HOURS   • naproxen sodium (Aleve) 220 mg tablet oral, Every 12 hours PRN   • NIFEdipine ER (ADALAT CC) 60 mg, oral, Daily   • spironolactone (ALDACTONE) 50 mg, oral, Daily   • sulfaSALAzine (Azulfidine) 500 mg tablet TAKE 2 TABLETS BY MOUTH THREE  TIMES A DAY   • tamsulosin (Flomax) 0.4 mg 24 hr capsule TAKE 1 CAPSULE BY MOUTH ONCE DAILY        Last Recorded Vitals:  There were no vitals filed for this visit.    Review of Systems   All other systems reviewed and are negative.     Physical Exam:  Constitutional:       Appearance: Healthy appearance. Not in distress.   Neck:      Vascular: No JVR. JVD normal.   Pulmonary:      Effort: Pulmonary effort is normal.      Breath sounds: Normal breath sounds. No wheezing. No rhonchi. No rales.   Chest:      Chest wall: Not tender to palpatation.   Cardiovascular:      PMI at left midclavicular line. Normal rate. Regular rhythm. Normal S1. Normal S2.       Murmurs: There is no murmur.      No gallop.  No click. No rub.   Pulses:     Intact distal pulses.   Edema:     Peripheral edema absent.   Abdominal:      General: Bowel sounds are normal.      Palpations: Abdomen is soft.      Tenderness: There is no abdominal tenderness.   Musculoskeletal: Normal range of motion.         General: No tenderness. Skin:     General: Skin is warm and dry.   Neurological:      General: No focal deficit present.      Mental Status: Alert and oriented to person, place and time.            Last Labs:  CBC -  Lab Results   Component Value Date    WBC 3.0 (L) 11/24/2023    HGB 11.3 (L) 11/24/2023    HCT 35.6 (L) 11/24/2023    MCV 89 11/24/2023     11/24/2023       CMP -  Lab Results   Component Value Date    CALCIUM 8.7 11/24/2023    PHOS 3.2 07/27/2023    PROT 6.8 11/24/2023    ALBUMIN 3.6 11/24/2023    AST 34 11/24/2023    ALT 14 11/24/2023    ALKPHOS 132 11/24/2023    BILITOT 0.4 11/24/2023       LIPID PANEL -   Lab Results   Component Value Date    CHOL 144 11/24/2023    TRIG 82 11/24/2023    HDL 72.9 11/24/2023    CHHDL 2.0 11/24/2023    LDLF 50 08/12/2023    VLDL 16 11/24/2023    NHDL 71 11/24/2023       RENAL FUNCTION PANEL -   Lab Results   Component Value Date    GLUCOSE 98 11/24/2023     11/24/2023    K 3.8  11/24/2023    CL 97 (L) 11/24/2023    CO2 29 11/24/2023    ANIONGAP 15 11/24/2023    BUN 7 11/24/2023    CREATININE 0.69 11/24/2023    GFRMALE >90 08/12/2023    CALCIUM 8.7 11/24/2023    PHOS 3.2 07/27/2023    ALBUMIN 3.6 11/24/2023        Lab Results   Component Value Date     (H) 07/23/2023    HGBA1C 6.2 (H) 11/24/2023       Last Cardiology Tests:  07/25/2023 - TTE  1. Left ventricular systolic function is normal with a 70% estimated ejection fraction.  2. There is left ventricular concentric remodeling.  3. The mitral valve is mildly thickened. The mitral valve appears to be degenerative.  4. Mildly dilated aortic root.  5. The patient is in atrial fibrillation which may influence the estimate of left ventricular function and transvalvular flows.  6. Compared with study from 12/23/2022, the patient is now in atrial fibrillation with increased HR (117 bpm). No significant differences in LV dimensions and ejection fraction.    12/23/2022 - Stress Test  1. Normal stress myocardial perfusion imaging in response to pharmacologic stress. Well-maintained left ventricular function.  2. No clinical or electrocardiographic evidence for ischemia at maximal infusion. No ECG changes from baseline.    12/23/2022 - TTE  1. Left ventricular systolic function is normal with a 60-65% estimated ejection fraction.  2. Spectral Doppler shows an impaired relaxation pattern of left ventricular diastolic filling.    08/13/2021 - ELVA  1. The left ventricular systolic function is normal with a 55% estimated ejection fraction.  2. The aortic valve appears bicuspid.  3. Cardioversion cancelled as patient spontaneously converted to sinus rhythm during ELVA.    07/07/2017 - CT Calcium Score  Total: 0.  2. The visualized segments of the lungs are normally expanded. There are few ground-glass nodular densities in the right anterior lung and perihilar region up to 7 mm on image 3. Additional right lung nodule measuring 4 mm on image  12.  3. The visualized mid/lower ascending thoracic aorta measures 4.1 cm in diameter. Visualized thoracic aorta is mildly atherosclerotic.  4. The heart is borderline enlarged. No pericardial effusion is present.  5. No gross evidence of mediastinal or hilar lymphadenopathy is identified.  6. Small hiatal hernia. Fatty liver.    Assessment/Plan  1) Paroxysmal Atrial Fibrillation  On Eliquis 5 mg BID, Lasix 20 mg daily, lisinopril 20 mg daily, metoprolol tartrate 50 mg BID,   Planned for in 08/2021, but was cancelled as patient converted to SR during ELVA  Stress testing 12/23/2022 negative for ischemia   TTE 07/25/2023 with LVEF 70%, left ventricular concentric remodeling, mildly dilated aortic root  Longstanding h/o a-fib  Completely asymptomatic   Followed by Dr. Burton   Hospital admission 07/2023 with syncope  EKG today shows NSR  Check 14-day Holter  Referral to EP to discuss ablation   F/U 3 months       Scribe Attestation  By signing my name below, IJosephine Scribe   attest that this documentation has been prepared under the direction and in the presence of Bashir Whelan MD.

## 2024-01-05 NOTE — PROGRESS NOTES
Referred by Dr. Paolo Rogers for Atrial Fibrillation.    Counseling:  The patient was counseled regarding diagnostic results, instructions for management, risk factor reductions, prognosis, patient and family education, impressions, risks and benefits of treatment options and importance of compliance with treatment.       History Of Present Illness:    Kiko Petersen is a 63 y.o. male patient whose PMH is significant for HTN, atrial fibrillation, DM type 2, BPH with adenocarcinoma of the prostate, and BENNY (CPAP). He presents today to establish cardiovascular care for the evaluation and management of a-fib. Echocardiogram performed 07/25/2023 demonstrated an EF of 70%, left ventricular concentric remodeling, mildly dilated aortic root. The patient reports a longstanding history of a-fib that is followed by Dr. Burton; however, at his most recent followup with his PCP he was found to be in a-fib and he felt that he should not wait until his next scheduled EP followup in 03/2024. He has been asymptomatic, denying any CP, chest discomfort, SOB or palpitations. He is only aware of a-fib if he receives an alert from his Apple Watch. EKG today shows NSR. The patient is compliant with his prescribed medications     Past Surgical History:  He has a past surgical history that includes Hernia repair (10/14/2015); Excision / repair hydrocele pediatric; and Replacement total hip lateral position (Left).      Social History:  He reports that he quit smoking about 44 years ago. His smoking use included cigarettes. He started smoking about 45 years ago. He has a 0.01 pack-year smoking history. He has never used smokeless tobacco. He reports current alcohol use. He reports that he does not use drugs.    Family History:  Family History   Problem Relation Name Age of Onset    Hypertension Mother      Emphysema Mother      Lung cancer Father      Hypertension Father      Hypertension Brother      Heart attack Brother      Breast  cancer Mother's Sister      Hypertension Mother's Sister      Emphysema Mother's Sister      Colon cancer Father's Sister      Hypertension Father's Sister      Hypertension Father's Brother      Hypertension Maternal Grandmother      Hypertension Maternal Grandfather      Heart attack Maternal Grandfather          Allergies:  Penicillins and Erythromycin    Outpatient Medications:  Current Outpatient Medications   Medication Instructions    apixaban (Eliquis) 5 mg tablet TAKE 1 TABLET BY MOUTH TWO TIMES A DAY    folic acid (Folvite) 1 mg tablet TAKE 1 TABLET BY MOUTH ONCE DAILY    ketoconazole (NIZOral) 2 % cream Topical    Klor-Con M20 20 mEq ER tablet 1 tablet, oral, Daily    Lactobacillus acidophilus (Acidophilus) capsule 1 capsule, oral, Daily    Lasix 20 mg, oral, Daily    lisinopril 20 mg, oral, Daily    magnesium oxide (Mag-Ox) 400 mg (241.3 mg magnesium) tablet 1 tablet, oral, Daily    metFORMIN (Glucophage) 500 mg tablet TAKE 1 TABLET BY MOUTH TWO TIMES A DAY WITH MEALS    metoprolol tartrate (Lopressor) 50 mg tablet TAKE 1 TABLET BY MOUTH EVERY 12 HOURS    naproxen sodium (Aleve) 220 mg tablet oral, Every 12 hours PRN    NIFEdipine ER (ADALAT CC) 60 mg, oral, Daily    spironolactone (ALDACTONE) 50 mg, oral, Daily    sulfaSALAzine (Azulfidine) 500 mg tablet TAKE 2 TABLETS BY MOUTH THREE TIMES A DAY    tamsulosin (Flomax) 0.4 mg 24 hr capsule TAKE 1 CAPSULE BY MOUTH ONCE DAILY        Last Recorded Vitals:  There were no vitals filed for this visit.    Review of Systems   All other systems reviewed and are negative.     Physical Exam:  Constitutional:       Appearance: Healthy appearance. Not in distress.   Neck:      Vascular: No JVR. JVD normal.   Pulmonary:      Effort: Pulmonary effort is normal.      Breath sounds: Normal breath sounds. No wheezing. No rhonchi. No rales.   Chest:      Chest wall: Not tender to palpatation.   Cardiovascular:      PMI at left midclavicular line. Normal rate. Regular  rhythm. Normal S1. Normal S2.       Murmurs: There is no murmur.      No gallop.  No click. No rub.   Pulses:     Intact distal pulses.   Edema:     Peripheral edema absent.   Abdominal:      General: Bowel sounds are normal.      Palpations: Abdomen is soft.      Tenderness: There is no abdominal tenderness.   Musculoskeletal: Normal range of motion.         General: No tenderness. Skin:     General: Skin is warm and dry.   Neurological:      General: No focal deficit present.      Mental Status: Alert and oriented to person, place and time.            Last Labs:  CBC -  Lab Results   Component Value Date    WBC 3.0 (L) 11/24/2023    HGB 11.3 (L) 11/24/2023    HCT 35.6 (L) 11/24/2023    MCV 89 11/24/2023     11/24/2023       CMP -  Lab Results   Component Value Date    CALCIUM 8.7 11/24/2023    PHOS 3.2 07/27/2023    PROT 6.8 11/24/2023    ALBUMIN 3.6 11/24/2023    AST 34 11/24/2023    ALT 14 11/24/2023    ALKPHOS 132 11/24/2023    BILITOT 0.4 11/24/2023       LIPID PANEL -   Lab Results   Component Value Date    CHOL 144 11/24/2023    TRIG 82 11/24/2023    HDL 72.9 11/24/2023    CHHDL 2.0 11/24/2023    LDLF 50 08/12/2023    VLDL 16 11/24/2023    NHDL 71 11/24/2023       RENAL FUNCTION PANEL -   Lab Results   Component Value Date    GLUCOSE 98 11/24/2023     11/24/2023    K 3.8 11/24/2023    CL 97 (L) 11/24/2023    CO2 29 11/24/2023    ANIONGAP 15 11/24/2023    BUN 7 11/24/2023    CREATININE 0.69 11/24/2023    GFRMALE >90 08/12/2023    CALCIUM 8.7 11/24/2023    PHOS 3.2 07/27/2023    ALBUMIN 3.6 11/24/2023        Lab Results   Component Value Date     (H) 07/23/2023    HGBA1C 6.2 (H) 11/24/2023       Last Cardiology Tests:  07/25/2023 - TTE  1. Left ventricular systolic function is normal with a 70% estimated ejection fraction.  2. There is left ventricular concentric remodeling.  3. The mitral valve is mildly thickened. The mitral valve appears to be degenerative.  4. Mildly dilated aortic  root.  5. The patient is in atrial fibrillation which may influence the estimate of left ventricular function and transvalvular flows.  6. Compared with study from 12/23/2022, the patient is now in atrial fibrillation with increased HR (117 bpm). No significant differences in LV dimensions and ejection fraction.    12/23/2022 - Stress Test  1. Normal stress myocardial perfusion imaging in response to pharmacologic stress. Well-maintained left ventricular function.  2. No clinical or electrocardiographic evidence for ischemia at maximal infusion. No ECG changes from baseline.    12/23/2022 - TTE  1. Left ventricular systolic function is normal with a 60-65% estimated ejection fraction.  2. Spectral Doppler shows an impaired relaxation pattern of left ventricular diastolic filling.    08/13/2021 - ELVA  1. The left ventricular systolic function is normal with a 55% estimated ejection fraction.  2. The aortic valve appears bicuspid.  3. Cardioversion cancelled as patient spontaneously converted to sinus rhythm during ELVA.    07/07/2017 - CT Calcium Score  Total: 0.  2. The visualized segments of the lungs are normally expanded. There are few ground-glass nodular densities in the right anterior lung and perihilar region up to 7 mm on image 3. Additional right lung nodule measuring 4 mm on image 12.  3. The visualized mid/lower ascending thoracic aorta measures 4.1 cm in diameter. Visualized thoracic aorta is mildly atherosclerotic.  4. The heart is borderline enlarged. No pericardial effusion is present.  5. No gross evidence of mediastinal or hilar lymphadenopathy is identified.  6. Small hiatal hernia. Fatty liver.    Assessment/Plan   1) Paroxysmal Atrial Fibrillation  On Eliquis 5 mg BID, Lasix 20 mg daily, lisinopril 20 mg daily, metoprolol tartrate 50 mg BID,   Planned for in 08/2021, but was cancelled as patient converted to SR during ELVA  Stress testing 12/23/2022 negative for ischemia   TTE 07/25/2023 with LVEF  70%, left ventricular concentric remodeling, mildly dilated aortic root  Longstanding h/o a-fib  Completely asymptomatic   Followed by Dr. Burton   [facility] admission 07/2023 with syncope  EKG today shows NSR  Check 14-day Holter  Referral to EP to discuss ablation   F/U 3 months       Scribe Attestation  By signing my name below, I, Josephine Winkler, Scrdainele   attest that this documentation has been prepared under the direction and in the presence of Bashir Whelan MD.

## 2024-01-25 ENCOUNTER — TELEMEDICINE (OUTPATIENT)
Dept: UROLOGY | Facility: HOSPITAL | Age: 64
End: 2024-01-25
Payer: MEDICARE

## 2024-01-25 DIAGNOSIS — C61 ADENOCARCINOMA OF PROSTATE (MULTI): Primary | ICD-10-CM

## 2024-01-25 PROCEDURE — 99213 OFFICE O/P EST LOW 20 MIN: CPT | Performed by: UROLOGY

## 2024-01-29 ENCOUNTER — LAB (OUTPATIENT)
Dept: LAB | Facility: LAB | Age: 64
End: 2024-01-29
Payer: MEDICARE

## 2024-01-29 DIAGNOSIS — C61 ADENOCARCINOMA OF PROSTATE (MULTI): ICD-10-CM

## 2024-01-29 LAB — PSA SERPL-MCNC: 7.8 NG/ML

## 2024-01-29 PROCEDURE — 36415 COLL VENOUS BLD VENIPUNCTURE: CPT

## 2024-01-29 PROCEDURE — 84153 ASSAY OF PSA TOTAL: CPT

## 2024-02-01 DIAGNOSIS — C61 ADENOCARCINOMA OF PROSTATE (MULTI): ICD-10-CM

## 2024-02-01 PROBLEM — I48.91 ATRIAL FIBRILLATION, NEW ONSET (MULTI): Status: RESOLVED | Noted: 2023-10-30 | Resolved: 2024-02-01

## 2024-02-08 ENCOUNTER — OFFICE VISIT (OUTPATIENT)
Dept: CARDIOLOGY | Facility: CLINIC | Age: 64
End: 2024-02-08

## 2024-02-08 VITALS
DIASTOLIC BLOOD PRESSURE: 78 MMHG | SYSTOLIC BLOOD PRESSURE: 124 MMHG | HEART RATE: 67 BPM | HEIGHT: 70 IN | WEIGHT: 192 LBS | BODY MASS INDEX: 27.49 KG/M2

## 2024-02-08 DIAGNOSIS — R00.0 TACHYCARDIA: Primary | ICD-10-CM

## 2024-02-08 PROCEDURE — 1159F MED LIST DOCD IN RCRD: CPT | Performed by: INTERNAL MEDICINE

## 2024-02-08 PROCEDURE — 93000 ELECTROCARDIOGRAM COMPLETE: CPT | Performed by: INTERNAL MEDICINE

## 2024-02-08 PROCEDURE — 3078F DIAST BP <80 MM HG: CPT | Performed by: INTERNAL MEDICINE

## 2024-02-08 PROCEDURE — 1036F TOBACCO NON-USER: CPT | Performed by: INTERNAL MEDICINE

## 2024-02-08 PROCEDURE — 3074F SYST BP LT 130 MM HG: CPT | Performed by: INTERNAL MEDICINE

## 2024-02-08 PROCEDURE — 1160F RVW MEDS BY RX/DR IN RCRD: CPT | Performed by: INTERNAL MEDICINE

## 2024-02-08 PROCEDURE — 4010F ACE/ARB THERAPY RXD/TAKEN: CPT | Performed by: INTERNAL MEDICINE

## 2024-02-08 PROCEDURE — 99214 OFFICE O/P EST MOD 30 MIN: CPT | Performed by: INTERNAL MEDICINE

## 2024-02-08 NOTE — PROGRESS NOTES
[facility]    Patient Name: Kiko Petersen  Patient : 1960    Referred  for   Chief Complaint   Patient presents with    Follow-up        Kiko Petersen is a 63 y.o. year old male patient with    1. Chronic liver disease: Likely combination of hemochromatosis and alcohol use. He has avoided alcohol use.     2. Anemia: Recently diagnosed. Unclear if its blood loss versus secondary to liver disease. Has colonoscopy scheduled in a month.     3. Ulcerative colitis     4. BENNY- patient awaiting CPAP     5. Paroxysmal atrial fibrillation: Incidentally diagnosed during a PCP visit 2021. He appears to be asymptomatic, denies any palpitations chest pain or shortness of breath. In cardiologist office he was noted to be in A. fib with RVR, was started on anticoagulation and scheduled for ELVA cardioversion. During ELVA, patient spontaneously converted. ELVA with normal LV function and mild left atrial enlargement. Patient claims he has felt the same since. Event monitor 2021 showed a 15% A. fib burden. He was started on sotalol but only took it for 1month and was unable to get it refilled so he started back on Metoprolol.      2022 echocardiogram showed normal LV function of 60-65% with normal left atrium.   Monitor 2023 showed sinus rhythm with average heart rate 77 with no atrial fibrillation     Patient reports he has been feeling well. He wears a smart watch which has not alerted him to any atrial fibrillation since November.     Past Medical History:  He has a past medical history of Encounter for screening for malignant neoplasm of colon (2019), Unspecified cataract, and Urinary tract infection, site not specified (2020).    Past Surgical History:  He has a past surgical history that includes Hernia repair (10/14/2015); Excision / repair hydrocele pediatric; and Replacement total hip lateral position (Left).      Social History:  He  "reports that he quit smoking about 44 years ago. His smoking use included cigarettes. He started smoking about 45 years ago. He has a 0.01 pack-year smoking history. He has never used smokeless tobacco. He reports current alcohol use. He reports that he does not use drugs.    Family History:  Family History   Problem Relation Name Age of Onset    Hypertension Mother      Emphysema Mother      Lung cancer Father      Hypertension Father      Hypertension Brother      Heart attack Brother      Breast cancer Mother's Sister      Hypertension Mother's Sister      Emphysema Mother's Sister      Colon cancer Father's Sister      Hypertension Father's Sister      Hypertension Father's Brother      Hypertension Maternal Grandmother      Hypertension Maternal Grandfather      Heart attack Maternal Grandfather          Allergies:  Penicillins and Erythromycin    Outpatient Medications:  Current Outpatient Medications   Medication Instructions    apixaban (Eliquis) 5 mg tablet TAKE 1 TABLET BY MOUTH TWO TIMES A DAY    folic acid (Folvite) 1 mg tablet TAKE 1 TABLET BY MOUTH ONCE DAILY    ketoconazole (NIZOral) 2 % cream Topical    Lactobacillus acidophilus (Acidophilus) capsule 1 capsule, oral, Daily    Lasix 20 mg, oral, Daily    lisinopril 20 mg, oral, Daily    magnesium oxide (Mag-Ox) 400 mg (241.3 mg magnesium) tablet 1 tablet, oral, Daily    metFORMIN (Glucophage) 500 mg tablet TAKE 1 TABLET BY MOUTH TWO TIMES A DAY WITH MEALS    metoprolol tartrate (Lopressor) 50 mg tablet TAKE 1 TABLET BY MOUTH EVERY 12 HOURS    naproxen sodium (Aleve) 220 mg tablet oral, Every 12 hours PRN    NIFEdipine ER (ADALAT CC) 60 mg, oral, Daily    sulfaSALAzine (Azulfidine) 500 mg tablet TAKE 2 TABLETS BY MOUTH THREE TIMES A DAY        ROS:  A 14 point review of systems was done and is negative other than as stated in HPI    Vitals:  Vitals:    02/08/24 1607   BP: 124/78   Pulse: 67   Weight: 87.1 kg (192 lb)   Height: 1.778 m (5' 10\") "       Physical Exam:   Physical Exam  Constitutional:       General: He is not in acute distress.     Appearance: Normal appearance.   Cardiovascular:      Rate and Rhythm: Normal rate and regular rhythm.   Musculoskeletal:         General: No swelling.   Skin:     General: Skin is warm and dry.   Neurological:      Mental Status: He is alert.          Intake/Output:   No intake/output data recorded.    Outpatient Medications  Current Outpatient Medications on File Prior to Visit   Medication Sig Dispense Refill    apixaban (Eliquis) 5 mg tablet TAKE 1 TABLET BY MOUTH TWO TIMES A  tablet 1    folic acid (Folvite) 1 mg tablet TAKE 1 TABLET BY MOUTH ONCE DAILY 90 tablet 0    ketoconazole (NIZOral) 2 % cream Apply topically.      Lactobacillus acidophilus (Acidophilus) capsule Take 1 capsule by mouth once daily.      Lasix 40 mg tablet Take 0.5 tablets (20 mg) by mouth once daily. 90 tablet 0    lisinopril 20 mg tablet Take 1 tablet (20 mg) by mouth once daily. 90 tablet 0    magnesium oxide (Mag-Ox) 400 mg (241.3 mg magnesium) tablet Take 1 tablet (400 mg) by mouth once daily.      metFORMIN (Glucophage) 500 mg tablet TAKE 1 TABLET BY MOUTH TWO TIMES A DAY WITH MEALS 180 tablet 0    metoprolol tartrate (Lopressor) 50 mg tablet TAKE 1 TABLET BY MOUTH EVERY 12 HOURS 180 tablet 3    naproxen sodium (Aleve) 220 mg tablet Take by mouth every 12 hours if needed.      NIFEdipine ER (NIFEdipine CC) 60 mg 24 hr tablet Take 1 tablet (60 mg) by mouth once daily. 90 tablet 0    sulfaSALAzine (Azulfidine) 500 mg tablet TAKE 2 TABLETS BY MOUTH THREE TIMES A  tablet 3     No current facility-administered medications on file prior to visit.       Labs: (past 26 weeks)  Recent Results (from the past 4368 hour(s))   Hemoglobin A1C    Collection Time: 08/12/23  9:35 AM   Result Value Ref Range    Hemoglobin A1C 5.5 %    Estimated Average Glucose 111 MG/DL   Lipid Panel    Collection Time: 08/12/23  9:35 AM   Result Value  Ref Range    Cholesterol 142 0 - 199 mg/dL    HDL 63.8 mg/dL    Cholesterol/HDL Ratio 2.2     LDL 50 0 - 99 mg/dL    VLDL 28 0 - 40 mg/dL    Triglycerides 140 0 - 149 mg/dL   Comprehensive Metabolic Panel    Collection Time: 08/12/23  9:38 AM   Result Value Ref Range    Glucose 101 (H) 74 - 99 mg/dL    Sodium 137 136 - 145 mmol/L    Potassium 4.6 3.5 - 5.3 mmol/L    Chloride 105 98 - 107 mmol/L    Bicarbonate 23 21 - 32 mmol/L    Anion Gap 14 10 - 20 mmol/L    Urea Nitrogen 6 6 - 23 mg/dL    Creatinine 0.63 0.50 - 1.30 mg/dL    GFR MALE >90 >90 mL/min/1.73m2    Calcium 8.5 (L) 8.6 - 10.3 mg/dL    Albumin 3.5 3.4 - 5.0 g/dL    Alkaline Phosphatase 133 33 - 136 U/L    Total Protein 6.4 6.4 - 8.2 g/dL    AST 33 9 - 39 U/L    Total Bilirubin 0.5 0.0 - 1.2 mg/dL    ALT (SGPT) 14 10 - 52 U/L   CBC and Auto Differential    Collection Time: 08/12/23  9:38 AM   Result Value Ref Range    WBC 1.8 (L) 4.4 - 11.3 x10E9/L    RBC 3.55 (L) 4.50 - 5.90 x10E12/L    Hemoglobin 10.7 (L) 13.5 - 17.5 g/dL    Hematocrit 32.8 (L) 41.0 - 52.0 %    MCV 92 80 - 100 fL    MCHC 32.6 32.0 - 36.0 g/dL    Platelets 184 150 - 450 x10E9/L    RDW 15.8 (H) 11.5 - 14.5 %    Neutrophils % 37.0 40.0 - 80.0 %    Immature Granulocytes %, Automated 0.0 0.0 - 0.9 %    Lymphocytes % 41.4 13.0 - 44.0 %    Monocytes % 18.2 2.0 - 10.0 %    Eosinophils % 1.7 0.0 - 6.0 %    Basophils % 1.7 0.0 - 2.0 %    Neutrophils Absolute 0.67 (L) 1.20 - 7.70 x10E9/L    Lymphocytes Absolute 0.75 (L) 1.20 - 4.80 x10E9/L    Monocytes Absolute 0.33 0.10 - 1.00 x10E9/L    Eosinophils Absolute 0.03 0.00 - 0.70 x10E9/L    Basophils Absolute 0.03 0.00 - 0.10 x10E9/L   Ferritin    Collection Time: 08/12/23  9:38 AM   Result Value Ref Range    Ferritin 37 20 - 300 ug/L   Iron and TIBC    Collection Time: 08/12/23  9:38 AM   Result Value Ref Range    Iron 22 (L) 35 - 150 ug/dL    TIBC 335 240 - 445 ug/dL    Iron Saturation 7 (L) 25 - 45 %   Morphology    Collection Time: 08/12/23  9:38  AM   Result Value Ref Range    RBC Morphology SEE COMMENT    CBC and Auto Differential    Collection Time: 08/29/23 11:42 AM   Result Value Ref Range    WBC 2.3 (L) 4.4 - 11.3 x10E9/L    RBC 3.64 (L) 4.50 - 5.90 x10E12/L    Hemoglobin 11.3 (L) 13.5 - 17.5 g/dL    Hematocrit 34.5 (L) 41.0 - 52.0 %    MCV 95 80 - 100 fL    MCHC 32.8 32.0 - 36.0 g/dL    Platelets 155 150 - 450 x10E9/L    RDW 14.4 11.5 - 14.5 %    Neutrophils % 49.7 40.0 - 80.0 %    Immature Granulocytes %, Automated 0.0 0.0 - 0.9 %    Lymphocytes % 31.6 13.0 - 44.0 %    Monocytes % 16.5 2.0 - 10.0 %    Eosinophils % 0.9 0.0 - 6.0 %    Basophils % 1.3 0.0 - 2.0 %    Neutrophils Absolute 1.15 (L) 1.20 - 7.70 x10E9/L    Lymphocytes Absolute 0.73 (L) 1.20 - 4.80 x10E9/L    Monocytes Absolute 0.38 0.10 - 1.00 x10E9/L    Eosinophils Absolute 0.02 0.00 - 0.70 x10E9/L    Basophils Absolute 0.03 0.00 - 0.10 x10E9/L   CBC and Auto Differential    Collection Time: 11/24/23 10:24 AM   Result Value Ref Range    WBC 3.0 (L) 4.4 - 11.3 x10*3/uL    nRBC 0.0 0.0 - 0.0 /100 WBCs    RBC 3.98 (L) 4.50 - 5.90 x10*6/uL    Hemoglobin 11.3 (L) 13.5 - 17.5 g/dL    Hematocrit 35.6 (L) 41.0 - 52.0 %    MCV 89 80 - 100 fL    MCH 28.4 26.0 - 34.0 pg    MCHC 31.7 (L) 32.0 - 36.0 g/dL    RDW 15.4 (H) 11.5 - 14.5 %    Platelets 151 150 - 450 x10*3/uL    Neutrophils % 49.8 40.0 - 80.0 %    Immature Granulocytes %, Automated 0.3 0.0 - 0.9 %    Lymphocytes % 31.0 13.0 - 44.0 %    Monocytes % 16.3 2.0 - 10.0 %    Eosinophils % 1.3 0.0 - 6.0 %    Basophils % 1.3 0.0 - 2.0 %    Neutrophils Absolute 1.49 1.20 - 7.70 x10*3/uL    Immature Granulocytes Absolute, Automated 0.01 0.00 - 0.70 x10*3/uL    Lymphocytes Absolute 0.93 (L) 1.20 - 4.80 x10*3/uL    Monocytes Absolute 0.49 0.10 - 1.00 x10*3/uL    Eosinophils Absolute 0.04 0.00 - 0.70 x10*3/uL    Basophils Absolute 0.04 0.00 - 0.10 x10*3/uL   Comprehensive Metabolic Panel    Collection Time: 11/24/23 10:24 AM   Result Value Ref Range     Glucose 98 74 - 99 mg/dL    Sodium 137 136 - 145 mmol/L    Potassium 3.8 3.5 - 5.3 mmol/L    Chloride 97 (L) 98 - 107 mmol/L    Bicarbonate 29 21 - 32 mmol/L    Anion Gap 15 10 - 20 mmol/L    Urea Nitrogen 7 6 - 23 mg/dL    Creatinine 0.69 0.50 - 1.30 mg/dL    eGFR >90 >60 mL/min/1.73m*2    Calcium 8.7 8.6 - 10.6 mg/dL    Albumin 3.6 3.4 - 5.0 g/dL    Alkaline Phosphatase 132 33 - 136 U/L    Total Protein 6.8 6.4 - 8.2 g/dL    AST 34 9 - 39 U/L    Bilirubin, Total 0.4 0.0 - 1.2 mg/dL    ALT 14 10 - 52 U/L   Hemoglobin A1C    Collection Time: 11/24/23 10:24 AM   Result Value Ref Range    Hemoglobin A1C 6.2 (H) see below %    Estimated Average Glucose 131 Not Established mg/dL   Lipid Panel    Collection Time: 11/24/23 10:24 AM   Result Value Ref Range    Cholesterol 144 0 - 199 mg/dL    HDL-Cholesterol 72.9 mg/dL    Cholesterol/HDL Ratio 2.0     LDL Calculated 55 <=99 mg/dL    VLDL 16 0 - 40 mg/dL    Triglycerides 82 0 - 149 mg/dL    Non HDL Cholesterol 71 0 - 149 mg/dL   PSA    Collection Time: 01/29/24 12:25 PM   Result Value Ref Range    Prostate Specific AG 7.80 (H) <=4.00 ng/mL       ECG  Encounter Date: 01/05/24   ECG 12 Lead    Narrative    See scanned image        Echocardiogram  No results found for this or any previous visit from the past 1095 days.      Assessment/Plan:     Patient referred back to our clinic due to elevated AF burden associated with palpitations. Monitor from Jan 2024 with AF burden of 6% with rates up to 178bpm and longest duration of 12 hours. Discussed options for rhythm control including restarting AADs vs AF RFA. He would like to have ablation done.    Schedule for atrial fibrillation ablation, CARTO, general anesthesia, hold eliquis morning of procedure

## 2024-02-16 ENCOUNTER — PHARMACY VISIT (OUTPATIENT)
Dept: PHARMACY | Facility: CLINIC | Age: 64
End: 2024-02-16

## 2024-02-16 PROCEDURE — RXMED WILLOW AMBULATORY MEDICATION CHARGE

## 2024-02-19 DIAGNOSIS — I48.91 ATRIAL FIBRILLATION, UNSPECIFIED TYPE (MULTI): ICD-10-CM

## 2024-02-20 ENCOUNTER — APPOINTMENT (OUTPATIENT)
Dept: CARDIOLOGY | Facility: CLINIC | Age: 64
End: 2024-02-20

## 2024-02-20 PROBLEM — I48.91 ATRIAL FIBRILLATION (MULTI): Status: ACTIVE | Noted: 2024-02-19

## 2024-03-07 ENCOUNTER — LAB (OUTPATIENT)
Dept: LAB | Facility: LAB | Age: 64
End: 2024-03-07
Payer: MEDICARE

## 2024-03-07 DIAGNOSIS — E11.9 TYPE 2 DIABETES MELLITUS WITHOUT COMPLICATION, WITHOUT LONG-TERM CURRENT USE OF INSULIN (MULTI): ICD-10-CM

## 2024-03-07 DIAGNOSIS — I10 ESSENTIAL HYPERTENSION: ICD-10-CM

## 2024-03-07 DIAGNOSIS — K51.90 ULCERATIVE COLITIS WITHOUT COMPLICATIONS, UNSPECIFIED LOCATION (MULTI): ICD-10-CM

## 2024-03-07 DIAGNOSIS — I48.91 ATRIAL FIBRILLATION, UNSPECIFIED TYPE (MULTI): ICD-10-CM

## 2024-03-07 LAB
ALBUMIN SERPL BCP-MCNC: 4.1 G/DL (ref 3.4–5)
ALP SERPL-CCNC: 85 U/L (ref 33–136)
ALT SERPL W P-5'-P-CCNC: 22 U/L (ref 10–52)
ANION GAP SERPL CALC-SCNC: 15 MMOL/L (ref 10–20)
AST SERPL W P-5'-P-CCNC: 49 U/L (ref 9–39)
BASOPHILS # BLD AUTO: 0.03 X10*3/UL (ref 0–0.1)
BASOPHILS NFR BLD AUTO: 1 %
BILIRUB SERPL-MCNC: 0.6 MG/DL (ref 0–1.2)
BUN SERPL-MCNC: 6 MG/DL (ref 6–23)
CALCIUM SERPL-MCNC: 8.7 MG/DL (ref 8.6–10.3)
CHLORIDE SERPL-SCNC: 89 MMOL/L (ref 98–107)
CHOLEST SERPL-MCNC: 175 MG/DL (ref 0–199)
CHOLESTEROL/HDL RATIO: 1.6
CO2 SERPL-SCNC: 26 MMOL/L (ref 21–32)
CREAT SERPL-MCNC: 0.74 MG/DL (ref 0.5–1.3)
EGFRCR SERPLBLD CKD-EPI 2021: >90 ML/MIN/1.73M*2
EOSINOPHIL # BLD AUTO: 0.04 X10*3/UL (ref 0–0.7)
EOSINOPHIL NFR BLD AUTO: 1.4 %
ERYTHROCYTE [DISTWIDTH] IN BLOOD BY AUTOMATED COUNT: 15.4 % (ref 11.5–14.5)
EST. AVERAGE GLUCOSE BLD GHB EST-MCNC: 126 MG/DL
GLUCOSE SERPL-MCNC: 83 MG/DL (ref 74–99)
HBA1C MFR BLD: 6 %
HCT VFR BLD AUTO: 40.1 % (ref 41–52)
HDLC SERPL-MCNC: 108.1 MG/DL
HGB BLD-MCNC: 13.2 G/DL (ref 13.5–17.5)
IMM GRANULOCYTES # BLD AUTO: 0.01 X10*3/UL (ref 0–0.7)
IMM GRANULOCYTES NFR BLD AUTO: 0.3 % (ref 0–0.9)
INR PPP: 1.2 (ref 0.9–1.1)
LDLC SERPL CALC-MCNC: 53 MG/DL
LYMPHOCYTES # BLD AUTO: 0.89 X10*3/UL (ref 1.2–4.8)
LYMPHOCYTES NFR BLD AUTO: 30.5 %
MCH RBC QN AUTO: 29.3 PG (ref 26–34)
MCHC RBC AUTO-ENTMCNC: 32.9 G/DL (ref 32–36)
MCV RBC AUTO: 89 FL (ref 80–100)
MONOCYTES # BLD AUTO: 0.44 X10*3/UL (ref 0.1–1)
MONOCYTES NFR BLD AUTO: 15.1 %
NEUTROPHILS # BLD AUTO: 1.51 X10*3/UL (ref 1.2–7.7)
NEUTROPHILS NFR BLD AUTO: 51.7 %
NON HDL CHOLESTEROL: 67 MG/DL (ref 0–149)
NRBC BLD-RTO: 0 /100 WBCS (ref 0–0)
PLATELET # BLD AUTO: 152 X10*3/UL (ref 150–450)
POTASSIUM SERPL-SCNC: 4.3 MMOL/L (ref 3.5–5.3)
PROT SERPL-MCNC: 7.1 G/DL (ref 6.4–8.2)
PROTHROMBIN TIME: 13.5 SECONDS (ref 9.8–12.8)
RBC # BLD AUTO: 4.5 X10*6/UL (ref 4.5–5.9)
SODIUM SERPL-SCNC: 126 MMOL/L (ref 136–145)
TRIGL SERPL-MCNC: 69 MG/DL (ref 0–149)
VLDL: 14 MG/DL (ref 0–40)
WBC # BLD AUTO: 2.9 X10*3/UL (ref 4.4–11.3)

## 2024-03-07 PROCEDURE — 36415 COLL VENOUS BLD VENIPUNCTURE: CPT

## 2024-03-07 PROCEDURE — 85610 PROTHROMBIN TIME: CPT

## 2024-03-07 PROCEDURE — 85025 COMPLETE CBC W/AUTO DIFF WBC: CPT

## 2024-03-07 PROCEDURE — 80061 LIPID PANEL: CPT

## 2024-03-07 PROCEDURE — 80053 COMPREHEN METABOLIC PANEL: CPT

## 2024-03-07 PROCEDURE — 83036 HEMOGLOBIN GLYCOSYLATED A1C: CPT

## 2024-03-08 ENCOUNTER — TELEPHONE (OUTPATIENT)
Dept: CARDIOLOGY | Facility: CLINIC | Age: 64
End: 2024-03-08
Payer: MEDICARE

## 2024-03-08 NOTE — TELEPHONE ENCOUNTER
The patient is scheduled for Afib ablation with Dr. Burton on 3/12/24 at [facility] with an arrival time of 6:30. Labs are up to date. NPO after midnight the night before the procedure. Take morning medication with a sip of water except hold Eliquis the morning of the procedure. The patient may need to stay overnight for observation. He will need a ride home. If he goes home the same day, he will need someone to stay with him overnight.      I left a vm asking patient to call back to review instructions.

## 2024-03-11 ASSESSMENT — SLIT LAMP EXAM - LIDS
COMMENTS: GOOD POSITION, DERMATOCHALASIS
COMMENTS: GOOD POSITION, DERMATOCHALASIS

## 2024-03-11 ASSESSMENT — CUP TO DISC RATIO
OD_RATIO: .25
OS_RATIO: .25

## 2024-03-11 ASSESSMENT — EXTERNAL EXAM - RIGHT EYE: OD_EXAM: NORMAL

## 2024-03-11 ASSESSMENT — EXTERNAL EXAM - LEFT EYE: OS_EXAM: NORMAL

## 2024-03-11 NOTE — PROGRESS NOTES
LOV 10/15/21    Diabetes [id number]  -HbA1c= 6.0 (3/7/24). No diabetic retinopathy seen on exam. Continue close monitoring of blood glucose, blood pressure, and cholesterol. Plan for annual dilated eye exam.    Combined form of age-related cataract, both eyesH25.813  -Not visually significant at this time. Monitor.    [id number]  [id number]  -Continue OTC reading glasses  -Has natural monovision and history of wearing glasses for driving at night in the rain. Denies difficulty with distance vision (uncorrected).       No history of intraocular surgery/refractive surgery.   No FH of AMD  (+)FH glaucoma - MGM

## 2024-03-11 NOTE — TELEPHONE ENCOUNTER
Instructions were reviewed with the patient. He verbalized understanding of instructions including date, time, and location. All questions answered.   2

## 2024-03-12 ENCOUNTER — ANESTHESIA (OUTPATIENT)
Dept: CARDIOLOGY | Facility: HOSPITAL | Age: 64
End: 2024-03-12
Payer: MEDICARE

## 2024-03-12 ENCOUNTER — APPOINTMENT (OUTPATIENT)
Dept: CARDIOLOGY | Facility: HOSPITAL | Age: 64
End: 2024-03-12
Payer: MEDICARE

## 2024-03-12 ENCOUNTER — HOSPITAL ENCOUNTER (OUTPATIENT)
Facility: HOSPITAL | Age: 64
Setting detail: OUTPATIENT SURGERY
Discharge: HOME | End: 2024-03-12
Attending: INTERNAL MEDICINE | Admitting: INTERNAL MEDICINE
Payer: MEDICARE

## 2024-03-12 ENCOUNTER — ANESTHESIA EVENT (OUTPATIENT)
Dept: CARDIOLOGY | Facility: HOSPITAL | Age: 64
End: 2024-03-12
Payer: MEDICARE

## 2024-03-12 ENCOUNTER — PHARMACY VISIT (OUTPATIENT)
Dept: PHARMACY | Facility: CLINIC | Age: 64
End: 2024-03-12
Payer: COMMERCIAL

## 2024-03-12 VITALS
RESPIRATION RATE: 16 BRPM | HEART RATE: 85 BPM | SYSTOLIC BLOOD PRESSURE: 129 MMHG | WEIGHT: 180 LBS | OXYGEN SATURATION: 98 % | DIASTOLIC BLOOD PRESSURE: 65 MMHG | HEIGHT: 70 IN | BODY MASS INDEX: 25.77 KG/M2 | TEMPERATURE: 97.2 F

## 2024-03-12 DIAGNOSIS — I48.91 ATRIAL FIBRILLATION, UNSPECIFIED TYPE (MULTI): Primary | ICD-10-CM

## 2024-03-12 DIAGNOSIS — I48.0 PAROXYSMAL ATRIAL FIBRILLATION (MULTI): ICD-10-CM

## 2024-03-12 DIAGNOSIS — Z86.79 STATUS POST RADIOFREQUENCY ABLATION (RFA) OPERATION FOR ARRHYTHMIA: ICD-10-CM

## 2024-03-12 DIAGNOSIS — Z98.890 STATUS POST RADIOFREQUENCY ABLATION (RFA) OPERATION FOR ARRHYTHMIA: ICD-10-CM

## 2024-03-12 LAB
ANION GAP SERPL CALC-SCNC: 16 MMOL/L (ref 10–20)
ATRIAL RATE: 75 BPM
BUN SERPL-MCNC: 6 MG/DL (ref 6–23)
CALCIUM SERPL-MCNC: 8.5 MG/DL (ref 8.6–10.3)
CHLORIDE SERPL-SCNC: 92 MMOL/L (ref 98–107)
CO2 SERPL-SCNC: 22 MMOL/L (ref 21–32)
CREAT SERPL-MCNC: 0.58 MG/DL (ref 0.5–1.3)
EGFRCR SERPLBLD CKD-EPI 2021: >90 ML/MIN/1.73M*2
GLUCOSE BLD MANUAL STRIP-MCNC: 97 MG/DL (ref 74–99)
GLUCOSE SERPL-MCNC: 96 MG/DL (ref 74–99)
P AXIS: 54 DEGREES
P OFFSET: 178 MS
P ONSET: 129 MS
POTASSIUM SERPL-SCNC: 4.2 MMOL/L (ref 3.5–5.3)
PR INTERVAL: 176 MS
Q ONSET: 217 MS
QRS COUNT: 12 BEATS
QRS DURATION: 76 MS
QT INTERVAL: 426 MS
QTC CALCULATION(BAZETT): 475 MS
QTC FREDERICIA: 458 MS
R AXIS: -47 DEGREES
SODIUM SERPL-SCNC: 126 MMOL/L (ref 136–145)
T AXIS: 21 DEGREES
T OFFSET: 430 MS
VENTRICULAR RATE: 75 BPM

## 2024-03-12 PROCEDURE — 7100000010 HC PHASE TWO TIME - EACH INCREMENTAL 1 MINUTE: Performed by: INTERNAL MEDICINE

## 2024-03-12 PROCEDURE — 2780000003 HC OR 278 NO HCPCS: Performed by: INTERNAL MEDICINE

## 2024-03-12 PROCEDURE — RXMED WILLOW AMBULATORY MEDICATION CHARGE

## 2024-03-12 PROCEDURE — 99222 1ST HOSP IP/OBS MODERATE 55: CPT | Performed by: NURSE PRACTITIONER

## 2024-03-12 PROCEDURE — 2720000007 HC OR 272 NO HCPCS: Performed by: INTERNAL MEDICINE

## 2024-03-12 PROCEDURE — C1766 INTRO/SHEATH,STRBLE,NON-PEEL: HCPCS | Performed by: INTERNAL MEDICINE

## 2024-03-12 PROCEDURE — 93656 COMPRE EP EVAL ABLTJ ATR FIB: CPT | Performed by: INTERNAL MEDICINE

## 2024-03-12 PROCEDURE — 93621 COMP EP EVL L PAC&REC C SINS: CPT | Performed by: INTERNAL MEDICINE

## 2024-03-12 PROCEDURE — 93010 ELECTROCARDIOGRAM REPORT: CPT | Performed by: INTERNAL MEDICINE

## 2024-03-12 PROCEDURE — 2500000005 HC RX 250 GENERAL PHARMACY W/O HCPCS: Performed by: ANESTHESIOLOGIST ASSISTANT

## 2024-03-12 PROCEDURE — C1732 CATH, EP, DIAG/ABL, 3D/VECT: HCPCS | Performed by: INTERNAL MEDICINE

## 2024-03-12 PROCEDURE — 85347 COAGULATION TIME ACTIVATED: CPT

## 2024-03-12 PROCEDURE — 2500000004 HC RX 250 GENERAL PHARMACY W/ HCPCS (ALT 636 FOR OP/ED): Performed by: NURSE PRACTITIONER

## 2024-03-12 PROCEDURE — 76937 US GUIDE VASCULAR ACCESS: CPT | Performed by: INTERNAL MEDICINE

## 2024-03-12 PROCEDURE — 93005 ELECTROCARDIOGRAM TRACING: CPT | Mod: 59

## 2024-03-12 PROCEDURE — 93662 INTRACARDIAC ECG (ICE): CPT | Performed by: INTERNAL MEDICINE

## 2024-03-12 PROCEDURE — C1731 CATH, EP, 20 OR MORE ELEC: HCPCS | Performed by: INTERNAL MEDICINE

## 2024-03-12 PROCEDURE — 2500000005 HC RX 250 GENERAL PHARMACY W/O HCPCS: Performed by: INTERNAL MEDICINE

## 2024-03-12 PROCEDURE — C1760 CLOSURE DEV, VASC: HCPCS | Performed by: INTERNAL MEDICINE

## 2024-03-12 PROCEDURE — 3700000002 HC GENERAL ANESTHESIA TIME - EACH INCREMENTAL 1 MINUTE: Performed by: INTERNAL MEDICINE

## 2024-03-12 PROCEDURE — 80048 BASIC METABOLIC PNL TOTAL CA: CPT | Performed by: NURSE PRACTITIONER

## 2024-03-12 PROCEDURE — C1759 CATH, INTRA ECHOCARDIOGRAPHY: HCPCS | Performed by: INTERNAL MEDICINE

## 2024-03-12 PROCEDURE — 3700000001 HC GENERAL ANESTHESIA TIME - INITIAL BASE CHARGE: Performed by: INTERNAL MEDICINE

## 2024-03-12 PROCEDURE — 7100000009 HC PHASE TWO TIME - INITIAL BASE CHARGE: Performed by: INTERNAL MEDICINE

## 2024-03-12 PROCEDURE — 82947 ASSAY GLUCOSE BLOOD QUANT: CPT | Mod: 59

## 2024-03-12 PROCEDURE — 36415 COLL VENOUS BLD VENIPUNCTURE: CPT | Performed by: NURSE PRACTITIONER

## 2024-03-12 PROCEDURE — C1730 CATH, EP, 19 OR FEW ELECT: HCPCS | Performed by: INTERNAL MEDICINE

## 2024-03-12 PROCEDURE — 2500000004 HC RX 250 GENERAL PHARMACY W/ HCPCS (ALT 636 FOR OP/ED): Performed by: ANESTHESIOLOGIST ASSISTANT

## 2024-03-12 RX ORDER — PHENYLEPHRINE HCL IN 0.9% NACL 1 MG/10 ML
SYRINGE (ML) INTRAVENOUS AS NEEDED
Status: DISCONTINUED | OUTPATIENT
Start: 2024-03-12 | End: 2024-03-12

## 2024-03-12 RX ORDER — OXYCODONE HYDROCHLORIDE 5 MG/1
5 TABLET ORAL EVERY 4 HOURS PRN
Status: DISCONTINUED | OUTPATIENT
Start: 2024-03-12 | End: 2024-03-12 | Stop reason: HOSPADM

## 2024-03-12 RX ORDER — SODIUM CHLORIDE 9 MG/ML
100 INJECTION, SOLUTION INTRAVENOUS CONTINUOUS
Status: DISCONTINUED | OUTPATIENT
Start: 2024-03-12 | End: 2024-03-12

## 2024-03-12 RX ORDER — LIDOCAINE HYDROCHLORIDE 10 MG/ML
INJECTION, SOLUTION EPIDURAL; INFILTRATION; INTRACAUDAL; PERINEURAL AS NEEDED
Status: DISCONTINUED | OUTPATIENT
Start: 2024-03-12 | End: 2024-03-12 | Stop reason: HOSPADM

## 2024-03-12 RX ORDER — ROCURONIUM BROMIDE 10 MG/ML
INJECTION, SOLUTION INTRAVENOUS AS NEEDED
Status: DISCONTINUED | OUTPATIENT
Start: 2024-03-12 | End: 2024-03-12

## 2024-03-12 RX ORDER — ONDANSETRON HYDROCHLORIDE 2 MG/ML
4 INJECTION, SOLUTION INTRAVENOUS EVERY 8 HOURS PRN
Status: DISCONTINUED | OUTPATIENT
Start: 2024-03-12 | End: 2024-03-12 | Stop reason: HOSPADM

## 2024-03-12 RX ORDER — MIDAZOLAM HYDROCHLORIDE 1 MG/ML
INJECTION INTRAMUSCULAR; INTRAVENOUS AS NEEDED
Status: DISCONTINUED | OUTPATIENT
Start: 2024-03-12 | End: 2024-03-12

## 2024-03-12 RX ORDER — PANTOPRAZOLE SODIUM 40 MG/1
40 TABLET, DELAYED RELEASE ORAL DAILY
Qty: 30 TABLET | Refills: 0 | Status: SHIPPED | OUTPATIENT
Start: 2024-03-12 | End: 2024-04-11

## 2024-03-12 RX ORDER — ONDANSETRON 4 MG/1
4 TABLET, FILM COATED ORAL EVERY 8 HOURS PRN
Status: DISCONTINUED | OUTPATIENT
Start: 2024-03-12 | End: 2024-03-12 | Stop reason: HOSPADM

## 2024-03-12 RX ORDER — FENTANYL CITRATE 50 UG/ML
INJECTION, SOLUTION INTRAMUSCULAR; INTRAVENOUS AS NEEDED
Status: DISCONTINUED | OUTPATIENT
Start: 2024-03-12 | End: 2024-03-12

## 2024-03-12 RX ORDER — HEPARIN SODIUM 1000 [USP'U]/ML
INJECTION, SOLUTION INTRAVENOUS; SUBCUTANEOUS AS NEEDED
Status: DISCONTINUED | OUTPATIENT
Start: 2024-03-12 | End: 2024-03-12

## 2024-03-12 RX ORDER — HEPARIN SODIUM 200 [USP'U]/100ML
INJECTION, SOLUTION INTRAVENOUS CONTINUOUS PRN
Status: DISCONTINUED | OUTPATIENT
Start: 2024-03-12 | End: 2024-03-12

## 2024-03-12 RX ORDER — ONDANSETRON HYDROCHLORIDE 2 MG/ML
4 INJECTION, SOLUTION INTRAVENOUS ONCE AS NEEDED
Status: DISCONTINUED | OUTPATIENT
Start: 2024-03-12 | End: 2024-03-12 | Stop reason: HOSPADM

## 2024-03-12 RX ORDER — PHENYLEPHRINE 10 MG/250 ML(40 MCG/ML)IN 0.9 % SOD.CHLORIDE INTRAVENOUS
CONTINUOUS PRN
Status: DISCONTINUED | OUTPATIENT
Start: 2024-03-12 | End: 2024-03-12

## 2024-03-12 RX ORDER — LIDOCAINE HYDROCHLORIDE 20 MG/ML
INJECTION, SOLUTION INFILTRATION; PERINEURAL AS NEEDED
Status: DISCONTINUED | OUTPATIENT
Start: 2024-03-12 | End: 2024-03-12

## 2024-03-12 RX ORDER — ACETAMINOPHEN 325 MG/1
650 TABLET ORAL EVERY 4 HOURS PRN
Status: DISCONTINUED | OUTPATIENT
Start: 2024-03-12 | End: 2024-03-12 | Stop reason: HOSPADM

## 2024-03-12 RX ORDER — PROTAMINE SULFATE 10 MG/ML
INJECTION, SOLUTION INTRAVENOUS AS NEEDED
Status: DISCONTINUED | OUTPATIENT
Start: 2024-03-12 | End: 2024-03-12

## 2024-03-12 RX ORDER — SODIUM CHLORIDE, SODIUM LACTATE, POTASSIUM CHLORIDE, CALCIUM CHLORIDE 600; 310; 30; 20 MG/100ML; MG/100ML; MG/100ML; MG/100ML
100 INJECTION, SOLUTION INTRAVENOUS CONTINUOUS
Status: DISCONTINUED | OUTPATIENT
Start: 2024-03-12 | End: 2024-03-12 | Stop reason: HOSPADM

## 2024-03-12 RX ORDER — PROPOFOL 10 MG/ML
INJECTION, EMULSION INTRAVENOUS AS NEEDED
Status: DISCONTINUED | OUTPATIENT
Start: 2024-03-12 | End: 2024-03-12

## 2024-03-12 RX ORDER — ONDANSETRON HYDROCHLORIDE 2 MG/ML
INJECTION, SOLUTION INTRAVENOUS AS NEEDED
Status: DISCONTINUED | OUTPATIENT
Start: 2024-03-12 | End: 2024-03-12

## 2024-03-12 RX ORDER — LIDOCAINE HYDROCHLORIDE 10 MG/ML
0.1 INJECTION INFILTRATION; PERINEURAL ONCE
Status: DISCONTINUED | OUTPATIENT
Start: 2024-03-12 | End: 2024-03-12 | Stop reason: HOSPADM

## 2024-03-12 RX ADMIN — Medication 100 MCG: at 10:07

## 2024-03-12 RX ADMIN — Medication 0.2 MCG/KG/MIN: at 09:22

## 2024-03-12 RX ADMIN — PROPOFOL 120 MG: 10 INJECTION, EMULSION INTRAVENOUS at 09:05

## 2024-03-12 RX ADMIN — SODIUM CHLORIDE: 9 INJECTION, SOLUTION INTRAVENOUS at 08:45

## 2024-03-12 RX ADMIN — ROCURONIUM BROMIDE 10 MG: 10 INJECTION, SOLUTION INTRAVENOUS at 10:06

## 2024-03-12 RX ADMIN — ROCURONIUM BROMIDE 10 MG: 10 INJECTION, SOLUTION INTRAVENOUS at 10:22

## 2024-03-12 RX ADMIN — ROCURONIUM BROMIDE 20 MG: 10 INJECTION, SOLUTION INTRAVENOUS at 09:49

## 2024-03-12 RX ADMIN — PROTAMINE SULFATE 30 MG: 10 INJECTION, SOLUTION INTRAVENOUS at 11:24

## 2024-03-12 RX ADMIN — Medication 200 MCG: at 09:52

## 2024-03-12 RX ADMIN — HEPARIN SODIUM IN SODIUM CHLORIDE 1500 UNITS/HR: 200 INJECTION INTRAVENOUS at 09:48

## 2024-03-12 RX ADMIN — FENTANYL CITRATE 25 MCG: 50 INJECTION, SOLUTION INTRAMUSCULAR; INTRAVENOUS at 09:46

## 2024-03-12 RX ADMIN — HEPARIN SODIUM 13000 UNITS: 1000 INJECTION INTRAVENOUS; SUBCUTANEOUS at 09:48

## 2024-03-12 RX ADMIN — Medication 100 MCG: at 09:50

## 2024-03-12 RX ADMIN — MIDAZOLAM HYDROCHLORIDE 2 MG: 1 INJECTION INTRAMUSCULAR; INTRAVENOUS at 08:58

## 2024-03-12 RX ADMIN — Medication 100 MCG: at 09:05

## 2024-03-12 RX ADMIN — FENTANYL CITRATE 25 MCG: 50 INJECTION, SOLUTION INTRAMUSCULAR; INTRAVENOUS at 10:22

## 2024-03-12 RX ADMIN — SODIUM CHLORIDE 100 ML/HR: 9 INJECTION, SOLUTION INTRAVENOUS at 07:30

## 2024-03-12 RX ADMIN — LIDOCAINE HYDROCHLORIDE 100 MG: 20 INJECTION, SOLUTION INFILTRATION; PERINEURAL at 09:05

## 2024-03-12 RX ADMIN — ROCURONIUM BROMIDE 60 MG: 10 INJECTION, SOLUTION INTRAVENOUS at 09:05

## 2024-03-12 RX ADMIN — SUGAMMADEX 200 MG: 100 INJECTION, SOLUTION INTRAVENOUS at 11:29

## 2024-03-12 SDOH — HEALTH STABILITY: MENTAL HEALTH: CURRENT SMOKER: 0

## 2024-03-12 ASSESSMENT — PAIN SCALES - GENERAL
PAINLEVEL_OUTOF10: 0 - NO PAIN

## 2024-03-12 ASSESSMENT — COLUMBIA-SUICIDE SEVERITY RATING SCALE - C-SSRS
6. HAVE YOU EVER DONE ANYTHING, STARTED TO DO ANYTHING, OR PREPARED TO DO ANYTHING TO END YOUR LIFE?: NO
2. HAVE YOU ACTUALLY HAD ANY THOUGHTS OF KILLING YOURSELF?: NO
1. IN THE PAST MONTH, HAVE YOU WISHED YOU WERE DEAD OR WISHED YOU COULD GO TO SLEEP AND NOT WAKE UP?: NO

## 2024-03-12 ASSESSMENT — PAIN - FUNCTIONAL ASSESSMENT
PAIN_FUNCTIONAL_ASSESSMENT: 0-10

## 2024-03-12 ASSESSMENT — ENCOUNTER SYMPTOMS
MUSCULOSKELETAL NEGATIVE: 1
CONSTITUTIONAL NEGATIVE: 1
GASTROINTESTINAL NEGATIVE: 1
HEMATOLOGIC/LYMPHATIC NEGATIVE: 1
PSYCHIATRIC NEGATIVE: 1
ENDOCRINE NEGATIVE: 1
ALLERGIC/IMMUNOLOGIC NEGATIVE: 1
EYES NEGATIVE: 1
WEAKNESS: 1
RESPIRATORY NEGATIVE: 1
CARDIOVASCULAR NEGATIVE: 1

## 2024-03-12 NOTE — ANESTHESIA PROCEDURE NOTES
Airway  Date/Time: 3/12/2024 9:07 AM  Urgency: elective      Staffing  Performed: CAL   Authorized by: Pascual Baker MD    Performed by: CAL Redd  Patient location during procedure: OR    Indications and Patient Condition  Indications for airway management: anesthesia  Spontaneous Ventilation: absent  Sedation level: deep  Preoxygenated: yes  Mask difficulty assessment: 3 - difficult mask (inadequate, unstable or two providers) +/- NMBA    Final Airway Details  Final airway type: endotracheal airway      Successful airway: ETT  Cuffed: yes   Successful intubation technique: direct laryngoscopy  Blade: Yoshi  Blade size: #4  ETT size (mm): 7.5  Cormack-Lehane Classification: grade I - full view of glottis  Placement verified by: chest auscultation   Number of attempts at approach: 1    Additional Comments  Difficult BMV due to beard, oral airway

## 2024-03-12 NOTE — ANESTHESIA PROCEDURE NOTES
Arterial Line:    Date/Time: 3/12/2024 8:54 AM    Staffing  Performed: CAL   Authorized by: Pascual Baker MD    Performed by: CAL Redd    An arterial line was placed. Procedure performed using surface landmarks.in the OR for the following indication(s): continuous blood pressure monitoring.    A 20 gauge (size) (length), Angiocath (type) catheter was placed into the Right radial artery, secured by tapeEvents:  patient tolerated procedure well with no complications.      Additional notes:  X1 attempt, pt tolerated procedure well. Skin wheal 2% lidocaine used prior

## 2024-03-12 NOTE — PERIOPERATIVE NURSING NOTE
Pt arterial line blood pressures have been running soft. Dr. Baker made aware, pt asymptomatic at this time. Per dr. Baker continue to monitor and make him aware if it does not improve. This rn will continue to monitor

## 2024-03-12 NOTE — H&P
History Of Present Illness  Kiko Petersen is a 63 y.o. male presenting with paroxysmal afib. Patient denies missing any doses of eliquis in the past 30 days except for what he was instructed to hold prior to procedure. PMH includes chronic liver disease, anemia, UC, BENNY.     Past Medical History  He has a past medical history of Diabetes mellitus (CMS/HCC), Encounter for screening for malignant neoplasm of colon (07/30/2019), Hypertension, Unspecified cataract, and Urinary tract infection, site not specified (03/02/2020).    Surgical History  He has a past surgical history that includes Hernia repair (10/14/2015); Excision / repair hydrocele pediatric; and Replacement total hip lateral position (Left).     Social History  He reports that he quit smoking about 44 years ago. His smoking use included cigarettes. He started smoking about 45 years ago. He has a 0.01 pack-year smoking history. He has never used smokeless tobacco. He reports current alcohol use. He reports that he does not use drugs.    Family History  Family History   Problem Relation Name Age of Onset    Hypertension Mother      Emphysema Mother      Lung cancer Father      Hypertension Father      Hypertension Brother      Heart attack Brother      Breast cancer Mother's Sister      Hypertension Mother's Sister      Emphysema Mother's Sister      Colon cancer Father's Sister      Hypertension Father's Sister      Hypertension Father's Brother      Hypertension Maternal Grandmother      Hypertension Maternal Grandfather      Heart attack Maternal Grandfather          Allergies  Penicillins and Erythromycin    Home Medications  No current facility-administered medications on file prior to encounter.     Current Outpatient Medications on File Prior to Encounter   Medication Sig Dispense Refill    apixaban (Eliquis) 5 mg tablet TAKE 1 TABLET BY MOUTH TWO TIMES A  tablet 1    folic acid (Folvite) 1 mg tablet TAKE 1 TABLET BY MOUTH ONCE DAILY 90 tablet  0    ketoconazole (NIZOral) 2 % cream Apply topically.      Lactobacillus acidophilus (Acidophilus) capsule Take 1 capsule by mouth once daily.      Lasix 40 mg tablet Take 0.5 tablets (20 mg) by mouth once daily. 90 tablet 0    lisinopril 20 mg tablet Take 1 tablet (20 mg) by mouth once daily. 90 tablet 0    magnesium oxide (Mag-Ox) 400 mg (241.3 mg magnesium) tablet Take 1 tablet (400 mg) by mouth once daily.      metFORMIN (Glucophage) 500 mg tablet TAKE 1 TABLET BY MOUTH TWO TIMES A DAY WITH MEALS 180 tablet 0    metoprolol tartrate (Lopressor) 50 mg tablet TAKE 1 TABLET BY MOUTH EVERY 12 HOURS 180 tablet 3    naproxen sodium (Aleve) 220 mg tablet Take by mouth every 12 hours if needed.      NIFEdipine ER (NIFEdipine CC) 60 mg 24 hr tablet Take 1 tablet (60 mg) by mouth once daily. 90 tablet 0    sulfaSALAzine (Azulfidine) 500 mg tablet TAKE 2 TABLETS BY MOUTH THREE TIMES A  tablet 3          Inpatient Medications:  Scheduled medications   Medication Dose Route Frequency    lidocaine  0.1 mL subcutaneous Once     PRN medications   Medication    acetaminophen    HYDROmorphone    HYDROmorphone    lidocaine PF    ondansetron    oxyCODONE    oxygen    promethazine (Phenergan) 6.25 mg in sodium chloride 0.9% 50 mL IV     Continuous Medications   Medication Dose Last Rate    lactated Ringer's  100 mL/hr      sodium chloride 0.9%  100 mL/hr 100 mL/hr (03/12/24 0836)         Review of Systems   Constitutional: Negative.    HENT: Negative.     Eyes: Negative.    Respiratory: Negative.     Cardiovascular: Negative.    Gastrointestinal: Negative.    Endocrine: Negative.    Genitourinary: Negative.    Musculoskeletal: Negative.    Skin: Negative.    Allergic/Immunologic: Negative.    Neurological:  Positive for weakness (lower extremities, intermittently).   Hematological: Negative.    Psychiatric/Behavioral: Negative.            Physical Exam  Constitutional:       General: He is awake. He is not in acute  "distress.     Appearance: He is not ill-appearing or toxic-appearing.   HENT:      Nose: Nose normal.      Mouth/Throat:      Mouth: Mucous membranes are moist.      Pharynx: Oropharynx is clear.   Eyes:      Extraocular Movements: Extraocular movements intact.      Conjunctiva/sclera: Conjunctivae normal.   Cardiovascular:      Rate and Rhythm: Normal rate and regular rhythm.      Pulses: Normal pulses.           Radial pulses are 2+ on the right side and 2+ on the left side.        Dorsalis pedis pulses are 2+ on the right side and 2+ on the left side.      Heart sounds: Normal heart sounds. No murmur heard.  Pulmonary:      Effort: Pulmonary effort is normal.      Breath sounds: Normal breath sounds and air entry.   Abdominal:      General: Bowel sounds are normal. There is no distension.      Palpations: Abdomen is soft.      Tenderness: There is no abdominal tenderness.   Musculoskeletal:      Cervical back: Normal range of motion and neck supple.      Right lower leg: No edema.      Left lower leg: No edema.   Skin:     General: Skin is warm and dry.   Neurological:      General: No focal deficit present.      Mental Status: He is alert and oriented to person, place, and time. Mental status is at baseline.      GCS: GCS eye subscore is 4. GCS verbal subscore is 5. GCS motor subscore is 6.   Psychiatric:         Mood and Affect: Mood normal.         Behavior: Behavior normal. Behavior is cooperative.         Thought Content: Thought content normal.         Judgment: Judgment normal.        Sedation Plan    ASA 3     Mallampati class: III.         Last Recorded Vitals  Blood pressure 141/72, pulse 64, temperature 36.4 °C (97.5 °F), temperature source Temporal, resp. rate 18, height 1.778 m (5' 10\"), weight 81.6 kg (180 lb), SpO2 99 %.         Vitals from the Past 24 Hours  Heart Rate:  [64]   Temp:  [36.4 °C (97.5 °F)]   Resp:  [18]   BP: (141)/(72)   Height:  [177.8 cm (5' 10\")]   Weight:  [81.6 kg (180 lb)] "   SpO2:  [99 %]          Relevant Results    Labs    CBC:   Recent Labs     03/07/24  0929 11/24/23  1024 08/29/23  1142 08/12/23  0938 07/27/23  0546 07/26/23  1046   WBC 2.9* 3.0* 2.3* 1.8* 2.6* 4.3*   HGB 13.2* 11.3* 11.3* 10.7* 9.9* 10.0*   HCT 40.1* 35.6* 34.5* 32.8* 29.7* 30.2*    151 155 184 110* 145*   MCV 89 89 95 92 90 91     BMP/CMP:   Recent Labs     03/12/24  0739 03/07/24  0929 11/24/23  1024 08/12/23  0938 08/03/23  1533 07/27/23  0546 07/24/23  0758 07/23/23  1423 05/15/23  0652 03/11/23  1119 03/09/23  0817   * 126* 137 137 136 128*   < > 117* 130*   < > 128*  CANCELED   K 4.2 4.3 3.8 4.6 4.7 3.5   < > 4.9 4.7   < > 4.7  CANCELED   CL 92* 89* 97* 105 103 99   < > 80* 94*   < > 93*  CANCELED   BUN 6 6 7 6 7 10   < > 9 9   < > 10  CANCELED   CREATININE 0.58 0.74 0.69 0.63 0.75 0.60   < > 0.88 0.74   < > 0.69  CANCELED   CO2 22 26 29 23 25 22   < > 19* 25   < > 24  CANCELED   CALCIUM 8.5* 8.7 8.7 8.5* 8.7 7.6*   < > 9.8 9.0   < > 8.5*  CANCELED   PROT  --  7.1 6.8 6.4  --   --   --  7.1 6.8  --  7.0  CANCELED   BILITOT  --  0.6 0.4 0.5  --   --   --  1.3* 0.5  --  0.4  CANCELED   ALKPHOS  --  85 132 133  --   --   --  102 89  --  99  CANCELED   ALT  --  22 14 14  --   --   --  25 11  --  19  CANCELED   AST  --  49* 34 33  --   --   --  49* 29  --  45*  CANCELED   GLUCOSE 96 83 98 101* 86 93   < > 143* 112*   < > 130*  CANCELED    < > = values in this interval not displayed.      Lipid Panel:   Recent Labs     03/07/24  0929 11/24/23  1024 08/12/23  0935 05/15/23  0652 02/18/23  1105 11/05/22  1141 08/08/22  1536 05/07/22  1102 01/22/22  1114 10/26/21  1023 03/20/20  1101 09/21/19  1016   CHOL 175 144 142 154 150 139 157 114 157 148 190 211*   .1 72.9 63.8 88.9 80.5 59.3 69.2 60.5 78.1 76.2 69.6 75.5   CHHDL 1.6 2.0 2.2 1.7 1.9 2.3 2.3 1.9 2.0 1.9 2.7 2.8   VLDL 14 16 28 17 12 22 17 19 19 19 23 24   TRIG 69 82 140 87 61 110 85 94 96 97 113 120   NHDL 67 71  --   --   --   " --   --   --   --   --   --   --      Cardiac       No lab exists for component: \"CK\", \"CKMBP\"   Hemoglobin A1C:   Recent Labs     03/07/24  0929 11/24/23  1024 08/12/23  0935 05/15/23  0652 03/09/23  0817 02/18/23  1105 12/21/22  1444 11/05/22  1141 08/08/22  1536 05/07/22  1102 01/22/22  1114 10/26/21  1023   HGBA1C 6.0* 6.2* 5.5 6.6* 6.7*  CANCELED 7.1* 7.9* 7.4* 7.0* 6.2* 7.4* 6.6*     TSH/ Free T4:   Recent Labs     07/25/23  0445   TSH 1.70     Iron:   Recent Labs     08/12/23  0938 07/23/23  1423 03/09/23  0817 02/20/23  1540 08/29/22  1012 06/07/22  1534 03/07/22  1009 12/06/21  0958 09/14/21  1030 06/15/21  1515 03/16/21  1505 12/14/20  1514 09/21/20  1510   FERRITIN 37  --  48  CANCELED 57 30 61 48 60 72 71 83 73 79   TIBC 335  --   --   --   --  352  --   --   --   --   --   --   --    IRONSAT 7*  --   --   --   --  11*  --   --   --   --   --   --   --    BNP  --  142*  --   --   --   --   --   --   --   --   --   --   --      Coag:   Results from last 7 days   Lab Units 03/07/24  0929   INR  1.2*     ABO: No results found for: \"ABO\"    Past Cardiology Tests (Last 3 Years):  EKG:  Encounter Date: 02/08/24   ECG 12 lead (Clinic Performed)    Narrative    See scanned report     Echo:  No results found for this or any previous visit.    Ejection Fractions:  No results found for: \"EF\"  Cath:  No results found for this or any previous visit.    Stress Test:  Results for orders placed in visit on 12/23/22    Nuclear Stress Test    Narrative  MRN: [id number]  Patient Name: GERARD FERRER    STUDY:  CARDIAC STRESS/REST INJECTION;  12/23/2022 10:42 am    INDICATION:  STRESS TEST.    COMPARISON:  None.    ACCESSION NUMBER(S):  [id number]    ORDERING CLINICIAN:  ELMER FONSECA    TECHNIQUE:  DIVISION OF NUCLEAR MEDICINE  PHARMACOLOGIC STRESS MYOCARDIAL PERFUSION SCAN, ONE DAY PROTOCOL    The patient received an intravenous dose of  9 mCi of Tc-99m  tetrofosmin and resting emission tomographic (SPECT) images of " the  myocardium were acquired. The patient then received an intravenous  infusion of 0.4mg regadenoson (Lexiscan) followed by an additional  dose of 31.2 mCi of Tc-99m tetrofosmin. Stress phase SPECT images of  the myocardium were then acquired. These included ECG-gated images to  assess and quantify ventricular function.    FINDINGS:  Stress and rest images both demonstrate a normal distribution of  perfusion throughout all LV segments with no sign of ischemia.    ECG-gated images demonstrate normal LV size and myocardial  contractility with an LV ejection fraction of   76 % (normal above 50  percent).    Impression  1. Normal stress myocardial perfusion imaging in response to  pharmacologic stress.  2. Well-maintained left ventricular function.    Cardiac Imaging:  No results found for this or any previous visit.      === 02/23/22 ===    MR PROSTATE    - Impression -  1.  Unchanged PI rads 3 lesion in the left lateral transitional zone  of the mid to apex of the prostate compared to 2020 MRI, as  described..  2. Diffuse PI-RADS 2 changes throughout the right peripheral zone of  the mid to apex of the prostate, likely sequela of prior biopsy  and/or prostatitis.  3. Large right and mild left hydroceles    PI-RADS v2.1 Assessment Categories  PI-RADS 1 - Very low (clinically significant cancer is highly  unlikely to be present)  PI-RADS 2 - Low (clinically significant cancer is unlikely to be  present)  PI-RADS 3 - Intermediate (the presence of clinically significant  cancer is equivocal)  PI-RADS 4 - High (clinically significant cancer is likely to be  present)  PI-RADS 5 - Very high (clinically significant cancer is highly likely  to be present)    I personally reviewed the images/study and I agree with the findings  as stated. This study was interpreted at [facility], [place], [place].  === 07/07/17 ===    CT HEART CALCIUM SCORING    - Impression -  1. Coronary artery calcium  "score of 0*.  2. There are a few subcentimeter nodules and ground-glass  nodularities in the right lung as described for which follow-up  recommended.  3. Mild aneurysmal prominence ascending thoracic aorta up to 4.1 cm.  *Coronary artery calcium scoring may be helpful in predicting the  risk for future coronary heart disease events.  According to the  American College [employer] Foundation Clinical Expert Consensus  Task Force, such testing provides important prognostic information in  patients with more than one coronary heart disease risk factor. The  coronary artery calcium score correlates with the annual risk of a  non-fatal myocardial infarction or coronary heart disease death.    Coronary artery score            Annual Risk    0-99                             0.4%  100-399                        1.3%  >400                            2.4%    These three \"breakpoints\" correspond to lower, intermediate and high  risk states for future coronary events.  Such information should be  used, along with appropriate clinical judgment, to make decisions  regarding the intensity of risk factor management strategies to treat  blood lipids and to modify other non-lipid coronary risk factors.    Reference: [place] P et al. Circulation.  2007; 115:402-426     Assessment/Plan  Assessment/Plan   Principal Problem:    Atrial fibrillation (CMS/HCC)        paroxysmal afib  -RFCA with Dr. Burton on 3/12/24  -protonix for 30 days post procedure       I spent 30 minutes in the professional and overall care of this patient.      Miguel Rizo, KAYLEEN-CNP, DNP    "

## 2024-03-12 NOTE — ANESTHESIA POSTPROCEDURE EVALUATION
Patient: Kiko Petersen    Procedure Summary       Date: 03/12/24 Room / Location: [facility] / [facility]    Anesthesia Start: 0836 Anesthesia Stop: 1155    Procedure: Ablation A-Fib Diagnosis:       Atrial fibrillation, unspecified type (CMS/HCC)      (Atrial fibrillation, unspecified type (CMS/HCC) [I48.91])    Providers: Jaylen Guerrero MD Responsible Provider: Pascual Baker MD    Anesthesia Type: general ASA Status: 3            Anesthesia Type: general    Vitals Value Taken Time   /51 03/12/24 1203   Temp 98.8 03/12/24 1203   Pulse 79 03/12/24 1203   Resp 21 03/12/24 1203   SpO2 99 03/12/24 1203       Anesthesia Post Evaluation    Patient participation: complete - patient participated  Level of consciousness: awake  Pain management: adequate  Airway patency: patent  Cardiovascular status: acceptable  Respiratory status: acceptable  Hydration status: acceptable  Postoperative Nausea and Vomiting: none      No notable events documented.

## 2024-03-12 NOTE — ANESTHESIA PREPROCEDURE EVALUATION
Patient: Kiko Petersen    Procedure Information       Date/Time: 03/12/24 0830    Procedure: Ablation A-Fib - atrial fibrillation ablation, CARTO, general anesthesia, hold eliquis morning of procedure    Location: [facility] / [facility]    Providers: Jaylen Guerrero MD            Relevant Problems   Cardiovascular   (+) Abnormal ECG   (+) Atrial fibrillation (CMS/HCC)   (+) Essential hypertension   (+) Paroxysmal atrial fibrillation (CMS/HCC)      Endocrine   (+) Type 2 diabetes mellitus without complication (CMS/HCC)      GI   (+) Ulcerative colitis (CMS/HCC)      /Renal   (+) Alcoholic cirrhosis of liver with ascites (CMS/HCC)   (+) Cirrhosis (CMS/HCC)      Pulmonary   (+) BENNY on CPAP   (+) Shortness of breath on exertion      GI/Hepatic   (+) Alcoholic cirrhosis of liver with ascites (CMS/HCC)   (+) Cirrhosis (CMS/HCC)      Hematology   (+) Acute posthemorrhagic anemia   (+) Anemia due to blood loss   (+) Long term (current) use of anticoagulants      Musculoskeletal   (+) Osteoarthritis of left hip   (+) Polyosteoarthritis, unspecified   (+) Primary localized osteoarthrosis of left hip      Infectious Disease   (+) Tinea corporis       Clinical information reviewed:                    Past Medical History:   Diagnosis Date   • Encounter for screening for malignant neoplasm of colon 07/30/2019    Encounter for screening colonoscopy   • Unspecified cataract     Cataract, right eye   • Urinary tract infection, site not specified 03/02/2020    UTI (urinary tract infection), bacterial      Past Surgical History:   Procedure Laterality Date   • EXCISION / REPAIR HYDROCELE PEDIATRIC     • HERNIA REPAIR  10/14/2015    Inguinal Hernia Repair   • REPLACEMENT TOTAL HIP LATERAL POSITION Left      Social History     Tobacco Use   • Smoking status: Former     Packs/day: 0.10     Years: 0.10     Additional pack years: 0.00     Total pack years: 0.01     Types: Cigarettes     Start date: 1979      Quit date:      Years since quittin.2   • Smokeless tobacco: Never   Vaping Use   • Vaping Use: Never used   Substance Use Topics   • Alcohol use: Yes     Comment: may be 3 drinks a year   • Drug use: Never      Current Outpatient Medications   Medication Instructions   • apixaban (Eliquis) 5 mg tablet TAKE 1 TABLET BY MOUTH TWO TIMES A DAY   • folic acid (Folvite) 1 mg tablet TAKE 1 TABLET BY MOUTH ONCE DAILY   • ketoconazole (NIZOral) 2 % cream Topical   • Lactobacillus acidophilus (Acidophilus) capsule 1 capsule, oral, Daily   • Lasix 20 mg, oral, Daily   • lisinopril 20 mg, oral, Daily   • magnesium oxide (Mag-Ox) 400 mg (241.3 mg magnesium) tablet 1 tablet, oral, Daily   • metFORMIN (Glucophage) 500 mg tablet TAKE 1 TABLET BY MOUTH TWO TIMES A DAY WITH MEALS   • metoprolol tartrate (Lopressor) 50 mg tablet TAKE 1 TABLET BY MOUTH EVERY 12 HOURS   • naproxen sodium (Aleve) 220 mg tablet oral, Every 12 hours PRN   • NIFEdipine ER (ADALAT CC) 60 mg, oral, Daily   • sulfaSALAzine (Azulfidine) 500 mg tablet TAKE 2 TABLETS BY MOUTH THREE TIMES A DAY      Allergies   Allergen Reactions   • Penicillins Other, Swelling, Unknown and Anaphylaxis     Cefazolin tolerated for post-op prophylaxis following ortho procedure   • Erythromycin Diarrhea        Chemistry    Lab Results   Component Value Date/Time     (L) 2024 0929    K 4.3 2024 09    CL 89 (L) 2024 09    CO2 26 2024 0929    BUN 6 2024 0929    CREATININE 0.74 2024 0929    Lab Results   Component Value Date/Time    CALCIUM 8.7 2024 0929    ALKPHOS 85 2024 0929    AST 49 (H) 2024 0929    ALT 22 2024 09    BILITOT 0.6 2024 09          Lab Results   Component Value Date    HGBA1C 6.0 (H) 2024     Lab Results   Component Value Date/Time    WBC 2.9 (L) 2024    HGB 13.2 (L) 2024 09    HCT 40.1 (L) 2024 0929     2024 0929     Lab Results  "  Component Value Date/Time    PROTIME 13.5 (H) 03/07/2024 0929    INR 1.2 (H) 03/07/2024 0929     No results found for: \"ABORH\"  Encounter Date: 02/08/24   ECG 12 lead (Clinic Performed)    Narrative    See scanned report     No results found for this or any previous visit from the past 1095 days.       Visit Vitals  Smoking Status Former     No data recorded     Physical Exam    Airway  Mallampati: II  TM distance: >3 FB  Neck ROM: full     Cardiovascular - normal exam     Dental - normal exam     Pulmonary - normal exam     Abdominal - normal exam            Anesthesia Plan    History of general anesthesia?: yes  History of complications of general anesthesia?: no    ASA 3     general     The patient is not a current smoker.    intravenous induction   Postoperative administration of opioids is intended.  Trial extubation is planned.  Anesthetic plan and risks discussed with patient.  Use of blood products discussed with patient who.    Plan discussed with CRNA.    "

## 2024-03-13 ENCOUNTER — ANCILLARY PROCEDURE (OUTPATIENT)
Dept: CARDIOLOGY | Facility: CLINIC | Age: 64
End: 2024-03-13
Payer: MEDICARE

## 2024-03-13 ENCOUNTER — OFFICE VISIT (OUTPATIENT)
Dept: OPHTHALMOLOGY | Facility: CLINIC | Age: 64
End: 2024-03-13
Payer: MEDICARE

## 2024-03-13 DIAGNOSIS — H52.4 PRESBYOPIA: ICD-10-CM

## 2024-03-13 DIAGNOSIS — H25.813 COMBINED FORM OF AGE-RELATED CATARACT, BOTH EYES: ICD-10-CM

## 2024-03-13 DIAGNOSIS — I48.91 ATRIAL FIBRILLATION, NEW ONSET (MULTI): ICD-10-CM

## 2024-03-13 DIAGNOSIS — E11.9 DIABETES MELLITUS WITHOUT COMPLICATION (MULTI): Primary | ICD-10-CM

## 2024-03-13 DIAGNOSIS — H52.13 MYOPIA OF BOTH EYES: ICD-10-CM

## 2024-03-13 PROCEDURE — 93246 EXT ECG>7D<15D RECORDING: CPT | Performed by: INTERNAL MEDICINE

## 2024-03-13 PROCEDURE — 93248 EXT ECG>7D<15D REV&INTERPJ: CPT | Performed by: INTERNAL MEDICINE

## 2024-03-13 PROCEDURE — 92014 COMPRE OPH EXAM EST PT 1/>: CPT | Performed by: OPHTHALMOLOGY

## 2024-03-13 ASSESSMENT — VISUAL ACUITY
OS_BAT_MED: 20/25
OD_BAT_MED: 20/20
OS_SC+: +1
OD_SC: 20/25
OS_SC: 20/25
METHOD: SNELLEN - LINEAR
OD_SC+: +2

## 2024-03-13 ASSESSMENT — CONF VISUAL FIELD
OD_SUPERIOR_NASAL_RESTRICTION: 0
OS_INFERIOR_NASAL_RESTRICTION: 0
OS_INFERIOR_TEMPORAL_RESTRICTION: 0
OS_NORMAL: 1
OD_SUPERIOR_TEMPORAL_RESTRICTION: 0
OS_SUPERIOR_TEMPORAL_RESTRICTION: 0
OS_SUPERIOR_NASAL_RESTRICTION: 0
OD_NORMAL: 1
OD_INFERIOR_NASAL_RESTRICTION: 0
OD_INFERIOR_TEMPORAL_RESTRICTION: 0

## 2024-03-13 ASSESSMENT — ENCOUNTER SYMPTOMS
PSYCHIATRIC NEGATIVE: 0
MUSCULOSKELETAL NEGATIVE: 0
GASTROINTESTINAL NEGATIVE: 0
EYES NEGATIVE: 1
CARDIOVASCULAR NEGATIVE: 0
ALLERGIC/IMMUNOLOGIC NEGATIVE: 0
NEUROLOGICAL NEGATIVE: 0
HEMATOLOGIC/LYMPHATIC NEGATIVE: 0
RESPIRATORY NEGATIVE: 0
ENDOCRINE NEGATIVE: 1
CONSTITUTIONAL NEGATIVE: 0

## 2024-03-13 ASSESSMENT — REFRACTION_WEARINGRX
OS_SPHERE: OTC READERS
OD_SPHERE: OTC READERS

## 2024-03-13 ASSESSMENT — TONOMETRY
OS_IOP_MMHG: 16
IOP_METHOD: GOLDMANN APPLANATION
OD_IOP_MMHG: 15

## 2024-03-13 ASSESSMENT — REFRACTION_MANIFEST
OS_SPHERE: -0.75
OD_CYLINDER: SPHERE
OS_ADD: +2.50
OD_ADD: +2.50
OD_SPHERE: -0.25
OS_CYLINDER: SPHERE

## 2024-03-14 ENCOUNTER — OFFICE VISIT (OUTPATIENT)
Dept: PRIMARY CARE | Facility: CLINIC | Age: 64
End: 2024-03-14
Payer: MEDICARE

## 2024-03-14 VITALS
BODY MASS INDEX: 27.09 KG/M2 | HEART RATE: 96 BPM | SYSTOLIC BLOOD PRESSURE: 100 MMHG | DIASTOLIC BLOOD PRESSURE: 48 MMHG | OXYGEN SATURATION: 96 % | WEIGHT: 188.8 LBS | TEMPERATURE: 98.4 F

## 2024-03-14 DIAGNOSIS — I48.0 PAROXYSMAL ATRIAL FIBRILLATION (MULTI): ICD-10-CM

## 2024-03-14 DIAGNOSIS — E83.110 HEREDITARY HEMOCHROMATOSIS (CMS-HCC): ICD-10-CM

## 2024-03-14 DIAGNOSIS — Z00.00 MEDICARE ANNUAL WELLNESS VISIT, SUBSEQUENT: Primary | ICD-10-CM

## 2024-03-14 DIAGNOSIS — C61 ADENOCARCINOMA OF PROSTATE (MULTI): ICD-10-CM

## 2024-03-14 DIAGNOSIS — E11.9 TYPE 2 DIABETES MELLITUS WITHOUT COMPLICATION, WITHOUT LONG-TERM CURRENT USE OF INSULIN (MULTI): ICD-10-CM

## 2024-03-14 DIAGNOSIS — I10 ESSENTIAL HYPERTENSION: ICD-10-CM

## 2024-03-14 DIAGNOSIS — G47.33 OSA ON CPAP: ICD-10-CM

## 2024-03-14 DIAGNOSIS — K51.90 ULCERATIVE COLITIS WITHOUT COMPLICATIONS, UNSPECIFIED LOCATION (MULTI): ICD-10-CM

## 2024-03-14 DIAGNOSIS — K76.6 PORTAL HYPERTENSION (MULTI): ICD-10-CM

## 2024-03-14 PROCEDURE — 1159F MED LIST DOCD IN RCRD: CPT | Performed by: INTERNAL MEDICINE

## 2024-03-14 PROCEDURE — 3078F DIAST BP <80 MM HG: CPT | Performed by: INTERNAL MEDICINE

## 2024-03-14 PROCEDURE — 1124F ACP DISCUSS-NO DSCNMKR DOCD: CPT | Performed by: INTERNAL MEDICINE

## 2024-03-14 PROCEDURE — 3074F SYST BP LT 130 MM HG: CPT | Performed by: INTERNAL MEDICINE

## 2024-03-14 PROCEDURE — 1170F FXNL STATUS ASSESSED: CPT | Performed by: INTERNAL MEDICINE

## 2024-03-14 PROCEDURE — 3048F LDL-C <100 MG/DL: CPT | Performed by: INTERNAL MEDICINE

## 2024-03-14 PROCEDURE — 4010F ACE/ARB THERAPY RXD/TAKEN: CPT | Performed by: INTERNAL MEDICINE

## 2024-03-14 PROCEDURE — G0439 PPPS, SUBSEQ VISIT: HCPCS | Performed by: INTERNAL MEDICINE

## 2024-03-14 PROCEDURE — 1160F RVW MEDS BY RX/DR IN RCRD: CPT | Performed by: INTERNAL MEDICINE

## 2024-03-14 PROCEDURE — 1036F TOBACCO NON-USER: CPT | Performed by: INTERNAL MEDICINE

## 2024-03-14 PROCEDURE — 3044F HG A1C LEVEL LT 7.0%: CPT | Performed by: INTERNAL MEDICINE

## 2024-03-14 PROCEDURE — 99214 OFFICE O/P EST MOD 30 MIN: CPT | Performed by: INTERNAL MEDICINE

## 2024-03-14 RX ORDER — METFORMIN HYDROCHLORIDE 500 MG/1
500 TABLET ORAL
Qty: 180 TABLET | Refills: 0 | Status: SHIPPED | OUTPATIENT
Start: 2024-03-14

## 2024-03-14 RX ORDER — NIFEDIPINE 60 MG/1
60 TABLET, FILM COATED, EXTENDED RELEASE ORAL DAILY
Qty: 90 TABLET | Refills: 0 | Status: SHIPPED | OUTPATIENT
Start: 2024-03-14

## 2024-03-14 RX ORDER — LISINOPRIL 20 MG/1
20 TABLET ORAL DAILY
Qty: 90 TABLET | Refills: 0 | Status: SHIPPED | OUTPATIENT
Start: 2024-03-14

## 2024-03-14 ASSESSMENT — PATIENT HEALTH QUESTIONNAIRE - PHQ9
2. FEELING DOWN, DEPRESSED OR HOPELESS: NOT AT ALL
SUM OF ALL RESPONSES TO PHQ9 QUESTIONS 1 AND 2: 0
1. LITTLE INTEREST OR PLEASURE IN DOING THINGS: NOT AT ALL

## 2024-03-14 ASSESSMENT — ENCOUNTER SYMPTOMS
DEPRESSION: 0
OCCASIONAL FEELINGS OF UNSTEADINESS: 1
EYES NEGATIVE: 1
ALLERGIC/IMMUNOLOGIC NEGATIVE: 1
PSYCHIATRIC NEGATIVE: 1
HEMATOLOGIC/LYMPHATIC NEGATIVE: 1
ENDOCRINE NEGATIVE: 1
GASTROINTESTINAL NEGATIVE: 1
NEUROLOGICAL NEGATIVE: 1
LOSS OF SENSATION IN FEET: 0
CONSTITUTIONAL NEGATIVE: 1
RESPIRATORY NEGATIVE: 1
CARDIOVASCULAR NEGATIVE: 1
MUSCULOSKELETAL NEGATIVE: 1

## 2024-03-14 ASSESSMENT — ACTIVITIES OF DAILY LIVING (ADL)
DRESSING: INDEPENDENT
GROCERY_SHOPPING: INDEPENDENT
DOING_HOUSEWORK: INDEPENDENT
TAKING_MEDICATION: INDEPENDENT
BATHING: INDEPENDENT
MANAGING_FINANCES: INDEPENDENT

## 2024-03-14 NOTE — PROGRESS NOTES
Subjective   Patient ID: Kiko Petersen is a 63 y.o. male who presents for 3 month follow up  and Medicare Annual Wellness Visit Subsequent.  Patient presents in follow up regarding hypertension.  Blood pressure has been well controlled on current therapy.  No adverse effects of medication reported.  Patient denies chest pain, palpitations, shortness of breath, orthopnea, fatigue or edema.    Patient presents in follow up re:  Type 2 diabetes.  Patient has been compliant with medical therapy as prescribed and mostly compliant with diet and exercise recommendations.  HgbA1c has been maintained at goal level.  No hypoglycemia reported and no other associated complaints.    Patient underwent left hip arthroplasty on 3/28 with good outcome.  He did have anemia post-op wth Hgb of 8.8, but has improved to 10.7 on recent lab.        Review of Systems   Constitutional: Negative.    HENT: Negative.     Eyes: Negative.    Respiratory: Negative.     Cardiovascular: Negative.    Gastrointestinal: Negative.    Endocrine: Negative.    Genitourinary: Negative.    Musculoskeletal: Negative.    Skin: Negative.    Allergic/Immunologic: Negative.    Neurological: Negative.    Hematological: Negative.    Psychiatric/Behavioral: Negative.         Objective     Blood pressure (!) 100/48, pulse 96, temperature 36.9 °C (98.4 °F), temperature source Temporal, weight 85.6 kg (188 lb 12.8 oz), SpO2 96 %.   Physical Exam  Constitutional:       Appearance: Normal appearance.   Neck:      Vascular: No carotid bruit.   Cardiovascular:      Rate and Rhythm: Normal rate. Rhythm irregular.      Pulses: Normal pulses.      Heart sounds: Normal heart sounds. No murmur heard.  Pulmonary:      Effort: Pulmonary effort is normal.      Breath sounds: Normal breath sounds. No wheezing or rales.   Abdominal:      General: Abdomen is flat. There is no distension.      Palpations: Abdomen is soft.      Tenderness: There is no abdominal tenderness.    Musculoskeletal:         General: Normal range of motion.      Cervical back: Normal range of motion and neck supple.   Skin:     General: Skin is warm and dry.   Neurological:      General: No focal deficit present.      Mental Status: He is alert and oriented to person, place, and time.   Psychiatric:         Mood and Affect: Mood normal.         Behavior: Behavior normal.         Assessment/Plan   Problem List Items Addressed This Visit       Ulcerative colitis (CMS/HCC)    Adenocarcinoma of prostate (CMS/HCC)    BENNY on CPAP    Hereditary hemochromatosis (CMS/HCC)    Essential hypertension    Relevant Medications    NIFEdipine ER (NIFEdipine CC) 60 mg 24 hr tablet    lisinopril 20 mg tablet    Other Relevant Orders    CBC and Auto Differential    Paroxysmal atrial fibrillation (CMS/HCC)    Relevant Medications    NIFEdipine ER (NIFEdipine CC) 60 mg 24 hr tablet    Portal hypertension (CMS/HCC)     Other Visit Diagnoses       Medicare annual wellness visit, subsequent    -  Primary    Type 2 diabetes mellitus without complication, without long-term current use of insulin (CMS/[facility])        Relevant Medications    metFORMIN (Glucophage) 500 mg tablet    Other Relevant Orders    Hemoglobin A1C    Comprehensive Metabolic Panel    Lipid Panel          Medicare annual wellness completed with no new findings or issues identified.  Patient does not have advanced care planning in place.  This is discussed and form is given today  Sugars well controlled overall with A1c at goal.  Will continue present medical therapy and follow up.  BP well controlled on current therapy.  Advised to continue present therapy and follow.  It is noted that patient has been off lisinopril due to being out of it and the physician who had previously prescribed refusing to refill.  Lipid levels are excellent without intervention and cardiac eval negative other than the hx of a-fib, so absolutely no indication for statin therapy.  He continues to  follow with Cardiology re:  hx of a-fib.  He last saw Cardio last month at my recommendation due to more frequent episodes of a-fib and the plan is to have ablation procedure performed.  He states he just had this done on 3/12 and all went well.  He follows with GI re:  ulcerative colitis and hepatic pathology.  Pt. continues to use CPAP nightly and is tolerating well.  Advised to continue treatment and will continue to follow clinically.   He is doing well s/p left hip arthroplasty.  He had colonoscopy in 8/2022.  He follows with Dr. Lyle re:  hx of prostate cancer.  He also follows with Hepatology for chronic liver issues.      > 30 minutes was spent with the patient today, the majority of which was spent on review of history and medications as well as counselling on care plan and/or coordination of care.     Follow up in 3 months  Lab to be drawn 1 week prior to next office visit.

## 2024-03-15 LAB
ACT BLD: 352 SEC (ref 96–152)
ACT BLD: 372 SEC (ref 96–152)
ACT BLD: 374 SEC (ref 96–152)
ACT BLD: 380 SEC (ref 96–152)
ACT BLD: 426 SEC (ref 96–152)

## 2024-03-16 PROBLEM — N13.8 BENIGN PROSTATIC HYPERPLASIA WITH URINARY OBSTRUCTION: Status: ACTIVE | Noted: 2023-10-30

## 2024-03-16 PROBLEM — M25.559 ARTHRALGIA OF HIP: Status: ACTIVE | Noted: 2023-10-30

## 2024-03-16 PROBLEM — N40.1 BENIGN PROSTATIC HYPERPLASIA WITH URINARY OBSTRUCTION: Status: ACTIVE | Noted: 2023-10-30

## 2024-03-16 PROBLEM — N50.9 DISORDER OF MALE GENITAL ORGANS: Status: ACTIVE | Noted: 2023-10-30

## 2024-03-16 PROBLEM — R69 DISEASE SUSPECTED: Status: ACTIVE | Noted: 2023-10-30

## 2024-03-16 PROBLEM — R54 AGE-RELATED PHYSICAL DEBILITY: Status: ACTIVE | Noted: 2023-08-24

## 2024-03-16 PROBLEM — E87.70 HYPERVOLEMIA: Status: ACTIVE | Noted: 2023-03-28

## 2024-03-16 PROBLEM — Z98.890 HISTORY OF RADIOFREQUENCY ABLATION (RFA) PROCEDURE FOR CARDIAC ARRHYTHMIA: Status: ACTIVE | Noted: 2024-03-16

## 2024-03-16 PROBLEM — N50.89 SWELLING OF SCROTUM: Status: ACTIVE | Noted: 2024-03-16

## 2024-03-16 PROBLEM — R73.03 PREDIABETES: Status: ACTIVE | Noted: 2024-03-16

## 2024-03-16 PROBLEM — K74.60 HEPATIC CIRRHOSIS (MULTI): Status: ACTIVE | Noted: 2023-10-30

## 2024-03-16 PROBLEM — H26.9 CATARACT: Status: ACTIVE | Noted: 2021-10-15

## 2024-03-16 PROBLEM — R55 SYNCOPE: Status: ACTIVE | Noted: 2023-07-24

## 2024-04-04 ENCOUNTER — OFFICE VISIT (OUTPATIENT)
Dept: CARDIOLOGY | Facility: CLINIC | Age: 64
End: 2024-04-04
Payer: MEDICARE

## 2024-04-04 VITALS
OXYGEN SATURATION: 98 % | BODY MASS INDEX: 26.94 KG/M2 | SYSTOLIC BLOOD PRESSURE: 146 MMHG | WEIGHT: 188.2 LBS | DIASTOLIC BLOOD PRESSURE: 80 MMHG | HEIGHT: 70 IN | HEART RATE: 70 BPM

## 2024-04-04 DIAGNOSIS — I48.91 ATRIAL FIBRILLATION, NEW ONSET (MULTI): ICD-10-CM

## 2024-04-04 PROCEDURE — 3044F HG A1C LEVEL LT 7.0%: CPT | Performed by: INTERNAL MEDICINE

## 2024-04-04 PROCEDURE — 99213 OFFICE O/P EST LOW 20 MIN: CPT | Performed by: INTERNAL MEDICINE

## 2024-04-04 PROCEDURE — 93000 ELECTROCARDIOGRAM COMPLETE: CPT | Performed by: INTERNAL MEDICINE

## 2024-04-04 PROCEDURE — 1160F RVW MEDS BY RX/DR IN RCRD: CPT | Performed by: INTERNAL MEDICINE

## 2024-04-04 PROCEDURE — 1036F TOBACCO NON-USER: CPT | Performed by: INTERNAL MEDICINE

## 2024-04-04 PROCEDURE — 3077F SYST BP >= 140 MM HG: CPT | Performed by: INTERNAL MEDICINE

## 2024-04-04 PROCEDURE — 4010F ACE/ARB THERAPY RXD/TAKEN: CPT | Performed by: INTERNAL MEDICINE

## 2024-04-04 PROCEDURE — 1159F MED LIST DOCD IN RCRD: CPT | Performed by: INTERNAL MEDICINE

## 2024-04-04 PROCEDURE — 1123F ACP DISCUSS/DSCN MKR DOCD: CPT | Performed by: INTERNAL MEDICINE

## 2024-04-04 PROCEDURE — 3048F LDL-C <100 MG/DL: CPT | Performed by: INTERNAL MEDICINE

## 2024-04-04 PROCEDURE — 3079F DIAST BP 80-89 MM HG: CPT | Performed by: INTERNAL MEDICINE

## 2024-04-04 NOTE — PROGRESS NOTES
Counseling:  The patient was counseled regarding diagnostic results, instructions for management, risk factor reductions, prognosis, patient and family education, impressions, risks and benefits of treatment options and importance of compliance with treatment.      Chief Complaint:  The patient presents today for 3-month followup of atrial fibrillation s/p Holter monitor and a-fib ablation.      History Of Present Illness:    Kiko Petersen is a 63 y.o. male patient who presents today for 3-month followup of atrial fibrillation s/p Holter monitor His PMH is significant for HTN, atrial fibrillation, DM type 2, BPH with adenocarcinoma of the prostate, and BENNY (CPAP). Holter monitoring performed from 01/05/2024 to 01/19/2024 demonstrated 31 runs of SVT and an a-fib/flutter burden of 6%. At last office visit, the patient was referred to Dr. Burton for evaluation of a-fib and to discuss potential ablation, whom he saw on 02/08/2024 and who discussed AADs vs a-fib RFA. The patient opted for RFA, and on 03/12/2024 was taken to the EP lab where he underwent successful radiofrequency ablation for paroxysmal atrial fibrillation. Today, the patient states that he is feeling well with no new cardiac complaints. He has recovered well from his ablation procedure. BP has been stable at home. EKG today shows that the patient is maintaining NSR. He is compliant with his prescribed medications.     Past Surgical History:  He has a past surgical history that includes Hernia repair (10/14/2015); Excision / repair hydrocele pediatric; Replacement total hip lateral position (Left); and Cardiac electrophysiology procedure (N/A, 3/12/2024).      Social History:  He reports that he quit smoking about 44 years ago. His smoking use included cigarettes. He started smoking about 45 years ago. He has a 0.01 pack-year smoking history. He has never used smokeless tobacco. He reports current alcohol use. He reports that he does not use  "drugs.    Family History:  Family History   Problem Relation Name Age of Onset    Hypertension Mother      Emphysema Mother      Lung cancer Father      Hypertension Father      Hypertension Brother      Heart attack Brother      Breast cancer Mother's Sister      Hypertension Mother's Sister      Emphysema Mother's Sister      Colon cancer Father's Sister      Hypertension Father's Sister      Hypertension Father's Brother      Hypertension Maternal Grandmother      Hypertension Maternal Grandfather      Heart attack Maternal Grandfather          Allergies:  Penicillins and Erythromycin    Outpatient Medications:  Current Outpatient Medications   Medication Instructions    apixaban (ELIQUIS) 5 mg, oral, 2 times daily, PLEASE RESUME TONIGHT 3/12/24    folic acid (Folvite) 1 mg tablet TAKE 1 TABLET BY MOUTH ONCE DAILY    ketoconazole (NIZOral) 2 % cream Topical    Lactobacillus acidophilus (Acidophilus) capsule 1 capsule, oral, Daily    Lasix 20 mg, oral, Daily    lisinopril 20 mg, oral, Daily    magnesium oxide (Mag-Ox) 400 mg (241.3 mg magnesium) tablet 1 tablet, oral, Daily    metFORMIN (Glucophage) 500 mg tablet TAKE 1 TABLET BY MOUTH TWO TIMES A DAY WITH MEALS    metoprolol tartrate (Lopressor) 50 mg tablet TAKE 1 TABLET BY MOUTH EVERY 12 HOURS    naproxen sodium (Aleve) 220 mg tablet oral, Every 12 hours PRN    NIFEdipine ER (ADALAT CC) 60 mg, oral, Daily    pantoprazole (PROTONIX) 40 mg, oral, Daily, Do not crush, chew, or split. Take for 30 days post procedure then stop    sulfaSALAzine (Azulfidine) 500 mg tablet TAKE 2 TABLETS BY MOUTH THREE TIMES A DAY        Last Recorded Vitals:  Vitals:    04/04/24 1037   BP: 146/80   BP Location: Left arm   Patient Position: Sitting   BP Cuff Size: Adult   Pulse: 70   SpO2: 98%   Weight: 85.4 kg (188 lb 3.2 oz)   Height: 1.778 m (5' 10\")       Review of Systems   All other systems reviewed and are negative.     Physical Exam:  Constitutional:       Appearance: Healthy " appearance. Not in distress.   Neck:      Vascular: No JVR. JVD normal.   Pulmonary:      Effort: Pulmonary effort is normal.      Breath sounds: Normal breath sounds. No wheezing. No rhonchi. No rales.   Chest:      Chest wall: Not tender to palpatation.   Cardiovascular:      PMI at left midclavicular line. Normal rate. Regular rhythm. Normal S1. Normal S2.       Murmurs: There is no murmur.      No gallop.  No click. No rub.   Pulses:     Intact distal pulses.   Edema:     Peripheral edema absent.   Abdominal:      General: Bowel sounds are normal.      Palpations: Abdomen is soft.      Tenderness: There is no abdominal tenderness.   Musculoskeletal: Normal range of motion.         General: No tenderness. Skin:     General: Skin is warm and dry.   Neurological:      General: No focal deficit present.      Mental Status: Alert and oriented to person, place and time.            Last Labs:  CBC -  Lab Results   Component Value Date    WBC 2.9 (L) 03/07/2024    HGB 13.2 (L) 03/07/2024    HCT 40.1 (L) 03/07/2024    MCV 89 03/07/2024     03/07/2024       CMP -  Lab Results   Component Value Date    CALCIUM 8.5 (L) 03/12/2024    PHOS 3.2 07/27/2023    PROT 7.1 03/07/2024    ALBUMIN 4.1 03/07/2024    AST 49 (H) 03/07/2024    ALT 22 03/07/2024    ALKPHOS 85 03/07/2024    BILITOT 0.6 03/07/2024       LIPID PANEL -   Lab Results   Component Value Date    CHOL 175 03/07/2024    TRIG 69 03/07/2024    .1 03/07/2024    CHHDL 1.6 03/07/2024    LDLF 50 08/12/2023    VLDL 14 03/07/2024    NHDL 67 03/07/2024       RENAL FUNCTION PANEL -   Lab Results   Component Value Date    GLUCOSE 96 03/12/2024     (L) 03/12/2024    K 4.2 03/12/2024    CL 92 (L) 03/12/2024    CO2 22 03/12/2024    ANIONGAP 16 03/12/2024    BUN 6 03/12/2024    CREATININE 0.58 03/12/2024    GFRMALE >90 08/12/2023    CALCIUM 8.5 (L) 03/12/2024    PHOS 3.2 07/27/2023    ALBUMIN 4.1 03/07/2024        Lab Results   Component Value Date      (H) 07/23/2023    HGBA1C 6.0 (H) 03/07/2024       Last Cardiology Tests:  01/05/2024 to 01/19/2024 - Holter Monitor  1. Predominant underlying rhythm was sinus rhythm; min HR 54 bpm, max  bpm, avg HR 75 bpm.  2. 31 supraventricular tachycardia runs occurred; fastest interval lasting 7 beats with max rate 169 bpm, longest lasting 29.5 seconds with avg rate 131 bpm.  3. Atrial fibrillation/flutter occurred (6% burden), ranging from  bpm (avg of 104 bpm), the longest lasting 12 hours 52 mins with avg rate 111 bpm. Atrial fibrillation/flutter was detected within +/- 45 seconds of symptomatic patient event(s).  4. Isolated SVEs were rare, SVE couplets were rare, and SVE triplets were rare.  5. Isolated VEs were rare, VE couplets were rare, and no VE triplets were present.     07/25/2023 - TTE  1. Left ventricular systolic function is normal with a 70% estimated ejection fraction.  2. There is left ventricular concentric remodeling.  3. The mitral valve is mildly thickened. The mitral valve appears to be degenerative.  4. Mildly dilated aortic root.  5. The patient is in atrial fibrillation which may influence the estimate of left ventricular function and transvalvular flows.  6. Compared with study from 12/23/2022, the patient is now in atrial fibrillation with increased HR (117 bpm). No significant differences in LV dimensions and ejection fraction.    12/23/2022 - Stress Test  1. Normal stress myocardial perfusion imaging in response to pharmacologic stress. Well-maintained left ventricular function.  2. No clinical or electrocardiographic evidence for ischemia at maximal infusion. No ECG changes from baseline.    12/23/2022 - TTE  1. Left ventricular systolic function is normal with a 60-65% estimated ejection fraction.  2. Spectral Doppler shows an impaired relaxation pattern of left ventricular diastolic filling.    08/13/2021 - ELVA  1. The left ventricular systolic function is normal with a 55%  estimated ejection fraction.  2. The aortic valve appears bicuspid.  3. Cardioversion cancelled as patient spontaneously converted to sinus rhythm during ELVA.    07/07/2017 - CT Calcium Score  Total: 0.  2. The visualized segments of the lungs are normally expanded. There are few ground-glass nodular densities in the right anterior lung and perihilar region up to 7 mm on image 3. Additional right lung nodule measuring 4 mm on image 12.  3. The visualized mid/lower ascending thoracic aorta measures 4.1 cm in diameter. Visualized thoracic aorta is mildly atherosclerotic.  4. The heart is borderline enlarged. No pericardial effusion is present.  5. No gross evidence of mediastinal or hilar lymphadenopathy is identified.  6. Small hiatal hernia. Fatty liver.    Assessment/Plan   1) Paroxysmal Atrial Fibrillation s/p RFA 03/12/2024  On Eliquis 5 mg BID, Lasix 20 mg daily, lisinopril 20 mg daily, metoprolol tartrate 50 mg BID, nifedipine 60 mg daily  Planned for DCC in 08/2021, but was cancelled as patient converted to SR during ELVA  Stress testing 12/23/2022 negative for ischemia   TTE 07/25/2023 with LVEF 70%, left ventricular concentric remodeling, mildly dilated aortic root  Longstanding h/o a-fib  Followed by Dr. Burton   Hospital admission 07/2023 with syncope  Holter monitor 01/05/2024 to 01/19/2024 with 31 runs of SVT, a-fib/flutter burden of 6%  Seen by Dr. Burton 02/08/2024 - discussed AADs vs AF RFA,  patient opted for RFA  S/P successful radiofrequency ablation for paroxysmal atrial fibrillation 03/12/2024  Asymptomatic  BP stable at home   Maintaining NSR  Followup with Brittany Montelongo NP, in 6 months        Scribe Attestation  By signing my name below, I, Charlie Bullock   attest that this documentation has been prepared under the direction and in the presence of Bashir Whelan MD.

## 2024-04-04 NOTE — PATIENT INSTRUCTIONS
Continue all current medications as prescribed.  Followup with Brittany Montelongo NP, in 6 months.      If you have any questions or cardiac concerns, please call our office at [phone].

## 2024-04-05 ENCOUNTER — APPOINTMENT (OUTPATIENT)
Dept: CARDIOLOGY | Facility: CLINIC | Age: 64
End: 2024-04-05
Payer: MEDICARE

## 2024-04-24 ENCOUNTER — APPOINTMENT (OUTPATIENT)
Dept: CARDIOLOGY | Facility: CLINIC | Age: 64
End: 2024-04-24
Payer: MEDICARE

## 2024-04-26 ENCOUNTER — APPOINTMENT (OUTPATIENT)
Dept: CARDIOLOGY | Facility: CLINIC | Age: 64
End: 2024-04-26
Payer: MEDICARE

## 2024-05-15 DIAGNOSIS — I48.0 PAROXYSMAL ATRIAL FIBRILLATION (MULTI): ICD-10-CM

## 2024-05-15 PROCEDURE — RXMED WILLOW AMBULATORY MEDICATION CHARGE

## 2024-05-15 RX ORDER — APIXABAN 5 MG/1
5 TABLET, FILM COATED ORAL 2 TIMES DAILY
Qty: 180 TABLET | Refills: 1 | Status: CANCELLED | OUTPATIENT
Start: 2024-05-15 | End: 2025-05-14

## 2024-05-22 ENCOUNTER — PHARMACY VISIT (OUTPATIENT)
Dept: PHARMACY | Facility: CLINIC | Age: 64
End: 2024-05-22
Payer: COMMERCIAL

## 2024-05-22 DIAGNOSIS — I48.0 PAROXYSMAL ATRIAL FIBRILLATION (MULTI): ICD-10-CM

## 2024-05-31 ENCOUNTER — TELEPHONE (OUTPATIENT)
Dept: CARDIOLOGY | Facility: CLINIC | Age: 64
End: 2024-05-31
Payer: MEDICARE

## 2024-05-31 ENCOUNTER — TELEPHONE (OUTPATIENT)
Dept: PHARMACY | Facility: HOSPITAL | Age: 64
End: 2024-05-31
Payer: MEDICARE

## 2024-05-31 DIAGNOSIS — I48.91 ATRIAL FIBRILLATION, UNSPECIFIED TYPE (MULTI): Primary | ICD-10-CM

## 2024-05-31 NOTE — TELEPHONE ENCOUNTER
Henderson is a consult from Dr. Whelan for the management of his warfarin. Attempted to reach the patient to set up an appointment and order warfarin for him. No response. Left message.

## 2024-05-31 NOTE — TELEPHONE ENCOUNTER
5/31/24  0949  Called and informed of plan for coumadin clinic which patient was agreeable to.    Referral placed and Stephania Pharm D notified of referral.         ----- Message from Bashir Whelan MD sent at 5/30/2024  5:03 PM EDT -----  Regarding: RE: AG med issues  Ok  ----- Message -----  From: Saurabh Medina RN  Sent: 5/30/2024   4:24 PM EDT  To: Bashir Whelan MD  Subject: FW: AG med issues                                If you don't mind I will refer him to the coumadin clinic.    ----- Message -----  From: Shanti Ramirez  Sent: 5/30/2024   3:40 PM EDT  To: [id number] Clinical Support Staff  Subject: AG med issues                                    He was called from the pharmacy about his Eliquis. He is asking for something else, He cannot afford and has not and will not be taking because of the cost.     He asked if we could call him and let him know what we may try so that he can see what insuranace will cover.

## 2024-06-05 DIAGNOSIS — I48.91 ATRIAL FIBRILLATION, UNSPECIFIED TYPE (MULTI): Primary | ICD-10-CM

## 2024-06-05 RX ORDER — WARFARIN SODIUM 5 MG/1
TABLET ORAL
Qty: 30 TABLET | Refills: 1 | Status: SHIPPED | OUTPATIENT
Start: 2024-06-05

## 2024-06-05 NOTE — PROGRESS NOTES
Spoke with Mr. Petersen. He is a consult from Dr. Whelan for the management of his warfarin. He previously was on eliquis. He stated he would  the warfarin tomorrow. He is to start 5mg daily starting tomorrow. He will take 2.5mg on Sunday. We will see him in the clinic on Monday.

## 2024-06-06 RX ORDER — SULFASALAZINE 500 MG/1
1000 TABLET ORAL 3 TIMES DAILY
Qty: 540 TABLET | Refills: 3 | OUTPATIENT
Start: 2024-06-06 | End: 2025-06-05

## 2024-06-07 DIAGNOSIS — K51.00 ULCERATIVE PANCOLITIS WITHOUT COMPLICATION (MULTI): Primary | ICD-10-CM

## 2024-06-07 RX ORDER — SULFASALAZINE 500 MG/1
1000 TABLET ORAL 3 TIMES DAILY
Qty: 540 TABLET | Refills: 3 | Status: SHIPPED | OUTPATIENT
Start: 2024-06-07 | End: 2025-06-06

## 2024-06-10 ENCOUNTER — ANTICOAGULATION - WARFARIN VISIT (OUTPATIENT)
Dept: PHARMACY | Facility: HOSPITAL | Age: 64
End: 2024-06-10
Payer: MEDICARE

## 2024-06-10 DIAGNOSIS — I48.0 PAROXYSMAL ATRIAL FIBRILLATION (MULTI): Primary | ICD-10-CM

## 2024-06-10 DIAGNOSIS — I48.91 ATRIAL FIBRILLATION, UNSPECIFIED TYPE (MULTI): ICD-10-CM

## 2024-06-10 LAB
POC INR: 5.1 (ref 2–3)
POC PROTHROMBIN TIME: 61.5

## 2024-06-10 PROCEDURE — 99211 OFF/OP EST MAY X REQ PHY/QHP: CPT

## 2024-06-10 PROCEDURE — 85610 PROTHROMBIN TIME: CPT | Mod: QW | Performed by: INTERNAL MEDICINE

## 2024-06-10 NOTE — PATIENT INSTRUCTIONS
Your INR was elevated at 5.1 (goal 2.0-3.0). Please hold today and tomorrow. We will follow up on Wednesday.  If any questions arise do not hesitate to call us at [phone] M-F from 8:30am-4:30pm or at   [phone] after 5pm or on the weekends. Continue to monitor for any excess bleeding or bruising, especially for blood in your stool.

## 2024-06-10 NOTE — PROGRESS NOTES
Mr. Petersen is a consult from Dr. Whelan to our clinic for the management of his warfarin therapy due to his history of atrial fibrillation. He has previously been taking Eliquis 5mg tablets. We discussed the various factors that can impact INR such as changes in medications and diet. We went over how our clinic works and what to expect. No signs or symptoms of bleeding. He said he rarely eats greens but will let us know if he eats them. He started taking warfarin last Thursday. We had him take 5 mg Thursday, Friday and Saturday then 2.5 mg on Sunday. Given his INR is elevated at 5.1, we will have him hold today and tomorrow. We will follow up on Wednesday.

## 2024-06-11 ENCOUNTER — LAB (OUTPATIENT)
Dept: LAB | Facility: LAB | Age: 64
End: 2024-06-11
Payer: MEDICARE

## 2024-06-11 DIAGNOSIS — I10 ESSENTIAL HYPERTENSION: ICD-10-CM

## 2024-06-11 DIAGNOSIS — E11.9 TYPE 2 DIABETES MELLITUS WITHOUT COMPLICATION, WITHOUT LONG-TERM CURRENT USE OF INSULIN (MULTI): ICD-10-CM

## 2024-06-11 LAB
ALBUMIN SERPL BCP-MCNC: 4.1 G/DL (ref 3.4–5)
ALP SERPL-CCNC: 122 U/L (ref 33–136)
ALT SERPL W P-5'-P-CCNC: 23 U/L (ref 10–52)
ANION GAP SERPL CALC-SCNC: 21 MMOL/L (ref 10–20)
AST SERPL W P-5'-P-CCNC: 55 U/L (ref 9–39)
BASOPHILS # BLD AUTO: 0.05 X10*3/UL (ref 0–0.1)
BASOPHILS NFR BLD AUTO: 1 %
BILIRUB SERPL-MCNC: 0.9 MG/DL (ref 0–1.2)
BUN SERPL-MCNC: 7 MG/DL (ref 6–23)
CALCIUM SERPL-MCNC: 8.5 MG/DL (ref 8.6–10.3)
CHLORIDE SERPL-SCNC: 90 MMOL/L (ref 98–107)
CHOLEST SERPL-MCNC: 182 MG/DL (ref 0–199)
CHOLESTEROL/HDL RATIO: 2
CO2 SERPL-SCNC: 24 MMOL/L (ref 21–32)
CREAT SERPL-MCNC: 0.9 MG/DL (ref 0.5–1.3)
EGFRCR SERPLBLD CKD-EPI 2021: >90 ML/MIN/1.73M*2
EOSINOPHIL # BLD AUTO: 0.01 X10*3/UL (ref 0–0.7)
EOSINOPHIL NFR BLD AUTO: 0.2 %
ERYTHROCYTE [DISTWIDTH] IN BLOOD BY AUTOMATED COUNT: 15.3 % (ref 11.5–14.5)
EST. AVERAGE GLUCOSE BLD GHB EST-MCNC: 108 MG/DL
GLUCOSE SERPL-MCNC: 91 MG/DL (ref 74–99)
HBA1C MFR BLD: 5.4 %
HCT VFR BLD AUTO: 37.9 % (ref 41–52)
HDLC SERPL-MCNC: 92.4 MG/DL
HGB BLD-MCNC: 13.2 G/DL (ref 13.5–17.5)
IMM GRANULOCYTES # BLD AUTO: 0.01 X10*3/UL (ref 0–0.7)
IMM GRANULOCYTES NFR BLD AUTO: 0.2 % (ref 0–0.9)
LDLC SERPL CALC-MCNC: 63 MG/DL
LYMPHOCYTES # BLD AUTO: 0.91 X10*3/UL (ref 1.2–4.8)
LYMPHOCYTES NFR BLD AUTO: 17.9 %
MCH RBC QN AUTO: 33.3 PG (ref 26–34)
MCHC RBC AUTO-ENTMCNC: 34.8 G/DL (ref 32–36)
MCV RBC AUTO: 96 FL (ref 80–100)
MONOCYTES # BLD AUTO: 0.64 X10*3/UL (ref 0.1–1)
MONOCYTES NFR BLD AUTO: 12.6 %
NEUTROPHILS # BLD AUTO: 3.46 X10*3/UL (ref 1.2–7.7)
NEUTROPHILS NFR BLD AUTO: 68.1 %
NON HDL CHOLESTEROL: 90 MG/DL (ref 0–149)
NRBC BLD-RTO: 0 /100 WBCS (ref 0–0)
PLATELET # BLD AUTO: 219 X10*3/UL (ref 150–450)
POTASSIUM SERPL-SCNC: 4.6 MMOL/L (ref 3.5–5.3)
PROT SERPL-MCNC: 7.3 G/DL (ref 6.4–8.2)
RBC # BLD AUTO: 3.96 X10*6/UL (ref 4.5–5.9)
SODIUM SERPL-SCNC: 130 MMOL/L (ref 136–145)
TRIGL SERPL-MCNC: 135 MG/DL (ref 0–149)
VLDL: 27 MG/DL (ref 0–40)
WBC # BLD AUTO: 5.1 X10*3/UL (ref 4.4–11.3)

## 2024-06-11 PROCEDURE — 85025 COMPLETE CBC W/AUTO DIFF WBC: CPT

## 2024-06-11 PROCEDURE — 80061 LIPID PANEL: CPT

## 2024-06-11 PROCEDURE — 36415 COLL VENOUS BLD VENIPUNCTURE: CPT

## 2024-06-11 PROCEDURE — 80053 COMPREHEN METABOLIC PANEL: CPT

## 2024-06-11 PROCEDURE — 83036 HEMOGLOBIN GLYCOSYLATED A1C: CPT

## 2024-06-12 ENCOUNTER — ANTICOAGULATION - WARFARIN VISIT (OUTPATIENT)
Dept: PHARMACY | Facility: HOSPITAL | Age: 64
End: 2024-06-12
Payer: MEDICARE

## 2024-06-12 DIAGNOSIS — I48.91 ATRIAL FIBRILLATION, UNSPECIFIED TYPE (MULTI): Primary | ICD-10-CM

## 2024-06-12 LAB
POC INR: 3.9 (ref 2–3)
POC PROTHROMBIN TIME: 47.1

## 2024-06-12 PROCEDURE — 85610 PROTHROMBIN TIME: CPT | Mod: QW | Performed by: INTERNAL MEDICINE

## 2024-06-12 PROCEDURE — 99211 OFF/OP EST MAY X REQ PHY/QHP: CPT

## 2024-06-12 NOTE — PATIENT INSTRUCTIONS
Your INR was elevated at 3.9 (goal 2.0-3.0). Please hold today and tomorrow.  We will have him restart his warfarin on Friday at 2.5mg daily. We will follow up on Monday.  If any questions arise do not hesitate to call us at [phone] M-F from 8:30am-4:30pm or at   [phone] after 5pm or on the weekends. Continue to monitor for any excess bleeding or bruising, especially for blood in your stool.

## 2024-06-12 NOTE — PROGRESS NOTES
Mr. Petersen is a consult from Dr. Whelan to our clinic for the management of his warfarin therapy due to his history of atrial fibrillation. He has previously been taking Eliquis 5mg tablets. We discussed the various factors that can impact INR such as changes in medications and diet. We went over how our clinic works and what to expect. No signs or symptoms of bleeding. He said he rarely eats greens but will let us know if he eats them. He does drink a few beers a week. He started taking warfarin last Thursday. We had him take 5 mg Thursday, Friday and Saturday then 2.5 mg on Sunday. His INR was elevated on Monday. We had him hold for 2 days. Given his INR is still elevated at 3.9, we will have him hold today and tomorrow. He will restart the warfarin on Friday at 2.5mg daily. We will follow up on Monday.

## 2024-06-13 PROCEDURE — RXMED WILLOW AMBULATORY MEDICATION CHARGE

## 2024-06-14 ENCOUNTER — APPOINTMENT (OUTPATIENT)
Dept: PRIMARY CARE | Facility: CLINIC | Age: 64
End: 2024-06-14
Payer: MEDICARE

## 2024-06-14 ENCOUNTER — PHARMACY VISIT (OUTPATIENT)
Dept: PHARMACY | Facility: CLINIC | Age: 64
End: 2024-06-14
Payer: COMMERCIAL

## 2024-06-14 VITALS
SYSTOLIC BLOOD PRESSURE: 100 MMHG | HEART RATE: 74 BPM | DIASTOLIC BLOOD PRESSURE: 42 MMHG | BODY MASS INDEX: 26.95 KG/M2 | TEMPERATURE: 98.2 F | WEIGHT: 187.8 LBS | OXYGEN SATURATION: 94 %

## 2024-06-14 DIAGNOSIS — E83.110 HEREDITARY HEMOCHROMATOSIS (CMS-HCC): ICD-10-CM

## 2024-06-14 DIAGNOSIS — G47.33 OSA ON CPAP: ICD-10-CM

## 2024-06-14 DIAGNOSIS — E11.9 TYPE 2 DIABETES MELLITUS WITHOUT COMPLICATION, WITHOUT LONG-TERM CURRENT USE OF INSULIN (MULTI): Primary | ICD-10-CM

## 2024-06-14 DIAGNOSIS — Z85.46 HISTORY OF PROSTATE CANCER: ICD-10-CM

## 2024-06-14 DIAGNOSIS — K76.6 PORTAL HYPERTENSION (MULTI): ICD-10-CM

## 2024-06-14 DIAGNOSIS — I10 ESSENTIAL HYPERTENSION: ICD-10-CM

## 2024-06-14 DIAGNOSIS — K51.90 ULCERATIVE COLITIS WITHOUT COMPLICATIONS, UNSPECIFIED LOCATION (MULTI): ICD-10-CM

## 2024-06-14 DIAGNOSIS — I48.0 PAROXYSMAL ATRIAL FIBRILLATION (MULTI): ICD-10-CM

## 2024-06-14 PROCEDURE — 3074F SYST BP LT 130 MM HG: CPT | Performed by: INTERNAL MEDICINE

## 2024-06-14 PROCEDURE — 3048F LDL-C <100 MG/DL: CPT | Performed by: INTERNAL MEDICINE

## 2024-06-14 PROCEDURE — 3044F HG A1C LEVEL LT 7.0%: CPT | Performed by: INTERNAL MEDICINE

## 2024-06-14 PROCEDURE — 1159F MED LIST DOCD IN RCRD: CPT | Performed by: INTERNAL MEDICINE

## 2024-06-14 PROCEDURE — 99214 OFFICE O/P EST MOD 30 MIN: CPT | Performed by: INTERNAL MEDICINE

## 2024-06-14 PROCEDURE — 1123F ACP DISCUSS/DSCN MKR DOCD: CPT | Performed by: INTERNAL MEDICINE

## 2024-06-14 PROCEDURE — G2211 COMPLEX E/M VISIT ADD ON: HCPCS | Performed by: INTERNAL MEDICINE

## 2024-06-14 PROCEDURE — RXMED WILLOW AMBULATORY MEDICATION CHARGE

## 2024-06-14 PROCEDURE — 3078F DIAST BP <80 MM HG: CPT | Performed by: INTERNAL MEDICINE

## 2024-06-14 PROCEDURE — 4010F ACE/ARB THERAPY RXD/TAKEN: CPT | Performed by: INTERNAL MEDICINE

## 2024-06-14 RX ORDER — METFORMIN HYDROCHLORIDE 500 MG/1
500 TABLET ORAL
Qty: 180 TABLET | Refills: 0 | Status: SHIPPED | OUTPATIENT
Start: 2024-06-14

## 2024-06-14 RX ORDER — LISINOPRIL 20 MG/1
20 TABLET ORAL DAILY
Qty: 90 TABLET | Refills: 0 | Status: SHIPPED | OUTPATIENT
Start: 2024-06-14

## 2024-06-14 RX ORDER — NIFEDIPINE 30 MG/1
30 TABLET, FILM COATED, EXTENDED RELEASE ORAL DAILY
Qty: 90 TABLET | Refills: 0 | Status: SHIPPED | OUTPATIENT
Start: 2024-06-14

## 2024-06-14 ASSESSMENT — ENCOUNTER SYMPTOMS
MUSCULOSKELETAL NEGATIVE: 1
CARDIOVASCULAR NEGATIVE: 1
ALLERGIC/IMMUNOLOGIC NEGATIVE: 1
PSYCHIATRIC NEGATIVE: 1
ENDOCRINE NEGATIVE: 1
HEMATOLOGIC/LYMPHATIC NEGATIVE: 1
CONSTITUTIONAL NEGATIVE: 1
EYES NEGATIVE: 1
NEUROLOGICAL NEGATIVE: 1
GASTROINTESTINAL NEGATIVE: 1
RESPIRATORY NEGATIVE: 1

## 2024-06-14 NOTE — PROGRESS NOTES
Subjective   Patient ID: Kiko Petersen is a 63 y.o. male who presents for 3 month follow up.  Patient presents in follow up regarding hypertension.  Blood pressure has been well controlled on current therapy.  No adverse effects of medication reported.  Patient denies chest pain, palpitations, shortness of breath, orthopnea, fatigue or edema.    Patient presents in follow up re:  Type 2 diabetes.  Patient has been compliant with medical therapy as prescribed and mostly compliant with diet and exercise recommendations.  HgbA1c has been maintained at goal level.  No hypoglycemia reported and no other associated complaints.    Patient underwent left hip arthroplasty on 3/28/23 with good outcome.  He did have anemia post-op wth Hgb of 8.8, but has improved to 13.7 on recent lab.        Review of Systems   Constitutional: Negative.    HENT: Negative.     Eyes: Negative.    Respiratory: Negative.     Cardiovascular: Negative.    Gastrointestinal: Negative.    Endocrine: Negative.    Genitourinary: Negative.    Musculoskeletal: Negative.    Skin: Negative.    Allergic/Immunologic: Negative.    Neurological: Negative.    Hematological: Negative.    Psychiatric/Behavioral: Negative.         Objective     Blood pressure (!) 100/42, pulse 74, temperature 36.8 °C (98.2 °F), temperature source Temporal, weight 85.2 kg (187 lb 12.8 oz), SpO2 94%.   Physical Exam  Constitutional:       Appearance: Normal appearance.   Neck:      Vascular: No carotid bruit.   Cardiovascular:      Rate and Rhythm: Normal rate. Rhythm irregular.      Pulses: Normal pulses.      Heart sounds: Normal heart sounds. No murmur heard.  Pulmonary:      Effort: Pulmonary effort is normal.      Breath sounds: Normal breath sounds. No wheezing or rales.   Abdominal:      General: Abdomen is flat. There is no distension.      Palpations: Abdomen is soft.      Tenderness: There is no abdominal tenderness.   Musculoskeletal:         General: Normal range of  motion.      Cervical back: Normal range of motion and neck supple.   Skin:     General: Skin is warm and dry.   Neurological:      General: No focal deficit present.      Mental Status: He is alert and oriented to person, place, and time.   Psychiatric:         Mood and Affect: Mood normal.         Behavior: Behavior normal.         Assessment/Plan   Problem List Items Addressed This Visit       Ulcerative colitis (Multi)    Type 2 diabetes mellitus without complication, without long-term current use of insulin (Multi) - Primary    Relevant Medications    metFORMIN (Glucophage) 500 mg tablet    Other Relevant Orders    Hemoglobin A1C    Comprehensive Metabolic Panel    Lipid Panel    BENNY on CPAP    Hereditary hemochromatosis (CMS-HCC)    Essential hypertension    Relevant Medications    NIFEdipine ER (NIFEdipine CC) 30 mg 24 hr tablet    lisinopril 20 mg tablet    Other Relevant Orders    CBC and Auto Differential    Comprehensive Metabolic Panel    Paroxysmal atrial fibrillation (Multi)    Relevant Medications    NIFEdipine ER (NIFEdipine CC) 30 mg 24 hr tablet    Portal hypertension (Multi)    History of prostate cancer       Sugars well controlled overall with A1c at goal.  Will continue present medical therapy and follow up.  BP well controlled on current therapy.  It is noted that patient has been developing lower leg edema.  I also note that BP is lower than necessary.  I suspect the nifedipine is an issue and will lower dose from 60 mg to 30 mg daily.  He will let me know how he is doing.  Lipid levels are excellent without intervention and cardiac eval negative other than the hx of a-fib, so absolutely no indication for statin therapy.  He continues to follow with Cardiology re:  hx of a-fib.  He is s/p ablation and doing very well.  He follows with GI re:  ulcerative colitis and hepatic pathology.  Pt. continues to use CPAP nightly and is tolerating well.  Advised to continue treatment and will continue to  follow clinically.   He is doing well s/p left hip arthroplasty.  He had colonoscopy in 8/2022.  He follows with Dr. Lyle re:  hx of prostate cancer.  He also follows with Hepatology for chronic liver issues.      Follow up in 3 months  Lab to be drawn 1 week prior to next office visit.

## 2024-06-17 ENCOUNTER — APPOINTMENT (OUTPATIENT)
Dept: PHARMACY | Facility: HOSPITAL | Age: 64
End: 2024-06-17
Payer: MEDICARE

## 2024-06-17 DIAGNOSIS — I48.91 ATRIAL FIBRILLATION, UNSPECIFIED TYPE (MULTI): Primary | ICD-10-CM

## 2024-06-17 LAB
POC INR: 2.3 (ref 2–3)
POC PROTHROMBIN TIME: 27.5

## 2024-06-17 PROCEDURE — 99211 OFF/OP EST MAY X REQ PHY/QHP: CPT

## 2024-06-17 PROCEDURE — 85610 PROTHROMBIN TIME: CPT | Mod: QW | Performed by: INTERNAL MEDICINE

## 2024-06-17 NOTE — PROGRESS NOTES
Mr. Petersen is a consult from Dr. Whelan to our clinic for the management of his warfarin therapy due to his history of atrial fibrillation. He has previously been taking Eliquis 5mg tablets. We discussed the various factors that can impact INR such as changes in medications and diet. He said nifedipine dose reduced to 30 mg daily. No signs or symptoms of bleeding. He said he rarely eats greens but will let us know if he eats them. He does drink a few beers a week. His INR was elevated last Monday. Given his INR is in range at 2.3, we will have him continue at 2.5 mg daily. We will follow up on Friday.

## 2024-06-21 ENCOUNTER — ANTICOAGULATION - WARFARIN VISIT (OUTPATIENT)
Dept: PHARMACY | Facility: HOSPITAL | Age: 64
End: 2024-06-21
Payer: MEDICARE

## 2024-06-21 DIAGNOSIS — I48.91 ATRIAL FIBRILLATION, UNSPECIFIED TYPE (MULTI): Primary | ICD-10-CM

## 2024-06-21 LAB
POC INR: 3.7 (ref 2–3)
POC PROTHROMBIN TIME: 44.5

## 2024-06-21 PROCEDURE — 99211 OFF/OP EST MAY X REQ PHY/QHP: CPT

## 2024-06-21 PROCEDURE — 85610 PROTHROMBIN TIME: CPT | Mod: QW | Performed by: INTERNAL MEDICINE

## 2024-06-21 RX ORDER — WARFARIN 2.5 MG/1
TABLET ORAL
Qty: 24 TABLET | Refills: 3 | Status: SHIPPED | OUTPATIENT
Start: 2024-06-21

## 2024-06-21 NOTE — PATIENT INSTRUCTIONS
Your INR was elevated at 3.7 (goal 2.0-3.0). Please hold today and then we will decrease your weekly regimen to 1.25mg Tues, Thursday and 2.5mg all other days. We will follow up on Wednesday.  If any questions arise do not hesitate to call us at [phone] M-F from 8:30am-4:30pm or at   [phone] after 5pm or on the weekends. Continue to monitor for any excess bleeding or bruising, especially for blood in your stool.

## 2024-06-21 NOTE — PROGRESS NOTES
Mr. Petersen is a consult from Dr. Whelan to our clinic for the management of his warfarin therapy due to his history of atrial fibrillation. He has previously been taking Eliquis 5mg tablets. He said nifedipine was stopped and he started the sulfasalazine on Monday. No signs or symptoms of bleeding. He said he rarely eats greens. He did have spinach this week and peas. He does drink a few beers a week. Given his INR is high most likely due to the interaction with the sulfasalazine, we will hold today and then decrease his weekly regimen to 1.25mg Tues, Thursday and 2.5mg all other days.  We will follow up on Wednesday.

## 2024-06-26 ENCOUNTER — ANTICOAGULATION - WARFARIN VISIT (OUTPATIENT)
Dept: PHARMACY | Facility: HOSPITAL | Age: 64
End: 2024-06-26
Payer: MEDICARE

## 2024-06-26 DIAGNOSIS — I48.91 ATRIAL FIBRILLATION, UNSPECIFIED TYPE (MULTI): Primary | ICD-10-CM

## 2024-06-26 LAB
POC INR: 4.1 (ref 2–3)
POC PROTHROMBIN TIME: 48.7

## 2024-06-26 PROCEDURE — 85610 PROTHROMBIN TIME: CPT | Mod: QW | Performed by: INTERNAL MEDICINE

## 2024-06-26 PROCEDURE — 99211 OFF/OP EST MAY X REQ PHY/QHP: CPT

## 2024-06-26 NOTE — PROGRESS NOTES
Mr. Petersen is a consult from Dr. Whelan to our clinic for the management of his warfarin therapy due to his history of atrial fibrillation. He has previously been taking Eliquis 5mg tablets. He said nifedipine was stopped and he started the sulfasalazine last Monday. No signs or symptoms of bleeding. He did have a vessel break in his eye but he reported he was bothering it and itching it a lot. He stated it has been clearing up.  No bruises without an explanation and they have been improving. He said he rarely eats greens. He did have some alcohol this past week. He had a few beers. Last week his INR was elevated and we held and reduced his dose. Today his INR is elevated at 4.1. We will hold today, tomorrow, and Friday. He is to restart the warfarin on Saturday. We will reduce his weekly regimen to 2.5mg M,W,F and 1.25mg all other days. We will follow up on Monday.

## 2024-06-26 NOTE — PATIENT INSTRUCTIONS
Your INR was elevated at 4.1 (goal 2.0-3.0). Please hold today, tomorrow, and Friday. Then we will reduce your weekly regimen to 2.5mg M,W,F and 1.25mg all other days. Next INR check on Monday.   If any questions arise do not hesitate to call us at [phone] M-F from 8:30am-4:30pm or at   [phone] after 5pm or on the weekends. Continue to monitor for any excess bleeding or bruising, especially for blood in your stool.

## 2024-07-01 ENCOUNTER — APPOINTMENT (OUTPATIENT)
Dept: PHARMACY | Facility: HOSPITAL | Age: 64
End: 2024-07-01
Payer: MEDICARE

## 2024-07-01 DIAGNOSIS — I48.91 ATRIAL FIBRILLATION, UNSPECIFIED TYPE (MULTI): Primary | ICD-10-CM

## 2024-07-01 LAB
POC INR: 1.3 (ref 2–3)
POC PROTHROMBIN TIME: 15.8

## 2024-07-01 PROCEDURE — 99211 OFF/OP EST MAY X REQ PHY/QHP: CPT

## 2024-07-01 PROCEDURE — 85610 PROTHROMBIN TIME: CPT | Mod: QW | Performed by: INTERNAL MEDICINE

## 2024-07-01 NOTE — PATIENT INSTRUCTIONS
Your INR was low at 1.3. Please start the reduced weekly regimen of 2.5mg M,W,F and 1.25mg all other days. Next INR check on Monday.     If any questions arise do not hesitate to call us at [phone] M-F from 8:30am-4:30pm or at   [phone] after 5pm or on the weekends. Continue to monitor for any excess bleeding or bruising, especially for blood in your stool.

## 2024-07-01 NOTE — PROGRESS NOTES
Mr. Petersen is a consult from Dr. Whelan to our clinic for the management of his warfarin therapy due to his history of atrial fibrillation. He has previously been taking Eliquis 5mg tablets. He said nifedipine was stopped and he started the sulfasalazine last week. No signs or symptoms of bleeding. He said he rarely eats greens. He did have green beans last night and he did have some spinach a few nights ago. He did have some alcohol this past week. He had a few beers. Last week we had him hold his warfarin 3 days due to being supratherapeutic. Today his INR is low at 1.3. We will attempt the reduced regimen of his weekly regimen to 2.5mg M,W,F and 1.25mg all other days. We will follow up on Monday.

## 2024-07-08 ENCOUNTER — APPOINTMENT (OUTPATIENT)
Dept: PHARMACY | Facility: HOSPITAL | Age: 64
End: 2024-07-08
Payer: MEDICARE

## 2024-07-08 DIAGNOSIS — I48.91 ATRIAL FIBRILLATION, UNSPECIFIED TYPE (MULTI): Primary | ICD-10-CM

## 2024-07-08 LAB
POC INR: 2.1 (ref 2–3)
POC PROTHROMBIN TIME: 24.9

## 2024-07-08 PROCEDURE — 99211 OFF/OP EST MAY X REQ PHY/QHP: CPT

## 2024-07-08 PROCEDURE — 85610 PROTHROMBIN TIME: CPT | Mod: QW | Performed by: INTERNAL MEDICINE

## 2024-07-08 NOTE — PROGRESS NOTES
Mr. Petersen is a consult from Dr. Whelan to our clinic for the management of his warfarin therapy due to his history of atrial fibrillation. He has previously been taking Eliquis 5mg tablets. He said nifedipine was stopped and he started the sulfasalazine two weeks ago. No signs or symptoms of bleeding. He said he rarely eats greens. He did have some peas the other night. Today his INR is in range at 2.1.  We will continue his weekly regimen to 2.5mg M,W,F and 1.25mg all other days. We will follow up on Monday.

## 2024-07-08 NOTE — PATIENT INSTRUCTIONS
Your INR was in range at 2.1. Please continue your weekly regimen of 2.5mg M,W,F and 1.25mg all other days. Next INR check on Monday.   If any questions arise do not hesitate to call us at [phone] M-F from 8:30am-4:30pm or at   [phone] after 5pm or on the weekends. Continue to monitor for any excess bleeding or bruising, especially for blood in your stool.

## 2024-07-11 ENCOUNTER — APPOINTMENT (OUTPATIENT)
Dept: UROLOGY | Facility: HOSPITAL | Age: 64
End: 2024-07-11
Payer: MEDICARE

## 2024-07-12 ENCOUNTER — APPOINTMENT (OUTPATIENT)
Dept: UROLOGY | Facility: HOSPITAL | Age: 64
End: 2024-07-12
Payer: MEDICARE

## 2024-07-15 ENCOUNTER — APPOINTMENT (OUTPATIENT)
Dept: PHARMACY | Facility: HOSPITAL | Age: 64
End: 2024-07-15
Payer: MEDICARE

## 2024-07-15 DIAGNOSIS — I48.91 ATRIAL FIBRILLATION, UNSPECIFIED TYPE (MULTI): Primary | ICD-10-CM

## 2024-07-15 LAB
POC INR: 3.7 (ref 2–3)
POC PROTHROMBIN TIME: 44.5 (ref 2–3)

## 2024-07-15 PROCEDURE — 99211 OFF/OP EST MAY X REQ PHY/QHP: CPT

## 2024-07-15 PROCEDURE — 85610 PROTHROMBIN TIME: CPT | Mod: QW | Performed by: INTERNAL MEDICINE

## 2024-07-15 NOTE — PATIENT INSTRUCTIONS
Your INR was high at 3.7. Please hold today and then decrease your weekly regimen to 2.5mg M,F and 1.25mg all other days. Next INR check on Wednesday.     If any questions arise do not hesitate to call us at [phone] M-F from 8:30am-4:30pm or at   [phone] after 5pm or on the weekends. Continue to monitor for any excess bleeding or bruising, especially for blood in your stool.

## 2024-07-15 NOTE — PROGRESS NOTES
Mr. Petersen is a consult from Dr. Whelan to our clinic for the management of his warfarin therapy due to his history of atrial fibrillation. He has previously been taking Eliquis 5mg tablets. He said nifedipine was stopped and he started the sulfasalazine three weeks ago. No signs or symptoms of bleeding. He said he rarely eats greens. He did have a garden salad this week. Today his INR is high at 3.7.  We will hold today and then decrease his weekly regimen to 2.5mg M,F and 1.25mg all other days. We will follow up next week.

## 2024-07-24 ENCOUNTER — APPOINTMENT (OUTPATIENT)
Dept: PHARMACY | Facility: HOSPITAL | Age: 64
End: 2024-07-24
Payer: MEDICARE

## 2024-07-24 DIAGNOSIS — I48.91 ATRIAL FIBRILLATION, UNSPECIFIED TYPE (MULTI): Primary | ICD-10-CM

## 2024-07-24 LAB
POC INR: 3 (ref 2–3)
POC PROTHROMBIN TIME: 36.2

## 2024-07-24 PROCEDURE — 85610 PROTHROMBIN TIME: CPT | Mod: QW | Performed by: INTERNAL MEDICINE

## 2024-07-24 PROCEDURE — 99211 OFF/OP EST MAY X REQ PHY/QHP: CPT

## 2024-07-24 NOTE — PROGRESS NOTES
Mr. Petersen is a consult from Dr. Whelan to our clinic for the management of his warfarin therapy due to his history of atrial fibrillation. He has previously been taking Eliquis 5mg tablets. He said nifedipine was stopped and he started the sulfasalazine three weeks ago. No signs or symptoms of bleeding. He said he rarely eats greens. He did have a garden salad this week. Yesterday he was having dry heaves and stated he took none of his medications. He thinks it may have been due to sinuses as this has happened before. He was good this morning when he woke up with no symptoms. Today his INR is in range at 3.0 after missing yesterday's dose, holding last Monday, and decreased his weekly dose.  Given his INR is on the upper range we will decrease his weekly regimen further to 2.5mg M and 1.25mg all other days.

## 2024-07-24 NOTE — PATIENT INSTRUCTIONS
Your INR was in range at 3.0. Please decrease your weekly regimen of 2.5mg M and 1.25mg all other days. Next INR check on Wednesday.     If any questions arise do not hesitate to call us at [phone] M-F from 8:30am-4:30pm or at   [phone] after 5pm or on the weekends. Continue to monitor for any excess bleeding or bruising, especially for blood in your stool.

## 2024-07-31 ENCOUNTER — APPOINTMENT (OUTPATIENT)
Dept: PHARMACY | Facility: HOSPITAL | Age: 64
End: 2024-07-31
Payer: MEDICARE

## 2024-07-31 DIAGNOSIS — I48.91 ATRIAL FIBRILLATION, UNSPECIFIED TYPE (MULTI): Primary | ICD-10-CM

## 2024-07-31 LAB
POC INR: 3.3
POC PROTHROMBIN TIME: 39.8

## 2024-07-31 PROCEDURE — 85610 PROTHROMBIN TIME: CPT | Mod: QW

## 2024-07-31 PROCEDURE — 99211 OFF/OP EST MAY X REQ PHY/QHP: CPT

## 2024-07-31 NOTE — PATIENT INSTRUCTIONS
Your INR was high at 3.3. Please hold your dose today then decrease your weekly regimen to 1.25mg. Next INR check on Wednesday.   If any questions arise do not hesitate to call us at [phone] M-F from 8:30am-4:30pm or at   [phone] after 5pm or on the weekends. Continue to monitor for any excess bleeding or bruising, especially for blood in your stool.

## 2024-07-31 NOTE — PROGRESS NOTES
Mr. Petersen is a consult from Dr. Whelan to our clinic for the management of his warfarin therapy due to his history of atrial fibrillation. He has previously been taking Eliquis 5mg tablets. He reports sinus issues and he has been taking some Coricidin-BP for this. No other changes to his medications or diet.  No signs or symptoms of bleeding. He said he rarely eats greens. He did have a garden salad this week. Today his INR is high at 3.3, and we will have him hold his dose today and reduce his weekly regimen to 1.25mg daily. Follow up in 1 week to recheck his INR.

## 2024-08-03 ENCOUNTER — LAB (OUTPATIENT)
Dept: LAB | Facility: LAB | Age: 64
End: 2024-08-03
Payer: MEDICARE

## 2024-08-03 DIAGNOSIS — C61 ADENOCARCINOMA OF PROSTATE (MULTI): ICD-10-CM

## 2024-08-03 LAB — PSA SERPL-MCNC: 10.66 NG/ML

## 2024-08-03 PROCEDURE — 36415 COLL VENOUS BLD VENIPUNCTURE: CPT

## 2024-08-03 PROCEDURE — 84153 ASSAY OF PSA TOTAL: CPT

## 2024-08-07 ENCOUNTER — ANTICOAGULATION - WARFARIN VISIT (OUTPATIENT)
Dept: PHARMACY | Facility: HOSPITAL | Age: 64
End: 2024-08-07
Payer: MEDICARE

## 2024-08-07 DIAGNOSIS — I48.91 ATRIAL FIBRILLATION, UNSPECIFIED TYPE (MULTI): Primary | ICD-10-CM

## 2024-08-07 LAB
POC INR: 1.9 (ref 2–3)
POC PROTHROMBIN TIME: 22.8

## 2024-08-07 PROCEDURE — 99211 OFF/OP EST MAY X REQ PHY/QHP: CPT

## 2024-08-07 PROCEDURE — 85610 PROTHROMBIN TIME: CPT | Mod: QW | Performed by: INTERNAL MEDICINE

## 2024-08-07 NOTE — PROGRESS NOTES
Mr. Petersen is a consult from Dr. Whelan to our clinic for the management of his warfarin therapy due to his history of atrial fibrillation. He has previously been taking Eliquis 5mg tablets. No other changes to his medications or diet.  No signs or symptoms of bleeding. He said he rarely eats greens. Today his INR is slightly low at 1.9 after a hold and a decrease in his weekly regimen last week. Given his INR is slightly low, we will continue his weekly regimen of 1.25mg daily. Follow up in 1 week to recheck his INR.

## 2024-08-07 NOTE — PATIENT INSTRUCTIONS
Your INR was slightly low at 1.9. Please continue your weekly regimen to 1.25mg. Next INR check on Wednesday.   If any questions arise do not hesitate to call us at [phone] M-F from 8:30am-4:30pm or at   [phone] after 5pm or on the weekends. Continue to monitor for any excess bleeding or bruising, especially for blood in your stool.

## 2024-08-08 NOTE — PROGRESS NOTES
FUV    Last seen - 1/25/24     HISTORY OF PRESENT ILLNESS:   Kiko Petersen is a 63 y.o. male who is being seen today for Psa results and follow up.    PAST MEDICAL HISTORY:  Past Medical History:   Diagnosis Date    Diabetes mellitus (Multi)     Encounter for screening for malignant neoplasm of colon 07/30/2019    Encounter for screening colonoscopy    Hypertension     Unspecified cataract     Cataract, right eye    Urinary tract infection, site not specified 03/02/2020    UTI (urinary tract infection), bacterial   - history of BPH  - elevated PSA with PI-RADS 3 on MRI 03/20/19  - [place] 3+3=6 prostate cancer Dx on 4/19/19, with PI-RADS 3 on MRI 03/27/2020 and ASAP on restaging on prostate biopsy 05/07/2020  - On active surveillance    PAST SURGICAL HISTORY:  Past Surgical History:   Procedure Laterality Date    CARDIAC ELECTROPHYSIOLOGY PROCEDURE N/A 3/12/2024    Procedure: Ablation A-Fib;  Surgeon: Jaylen Guerrero MD;  Location: [facility];  Service: Electrophysiology;  Laterality: N/A;  atrial fibrillation ablation, CARTO, general anesthesia, hold eliquis morning of procedure    EXCISION / REPAIR HYDROCELE PEDIATRIC      HERNIA REPAIR  10/14/2015    Inguinal Hernia Repair    REPLACEMENT TOTAL HIP LATERAL POSITION Left         ALLERGIES:   Allergies   Allergen Reactions    Penicillins Other, Swelling, Unknown and Anaphylaxis     Cefazolin tolerated for post-op prophylaxis following ortho procedure    Erythromycin Diarrhea        MEDICATIONS:   Current Outpatient Medications   Medication Instructions    folic acid (Folvite) 1 mg tablet TAKE 1 TABLET BY MOUTH ONCE DAILY    ketoconazole (NIZOral) 2 % cream Topical    Lactobacillus acidophilus (Acidophilus) capsule 1 capsule, oral, Daily    Lasix 20 mg, oral, Daily    lisinopril 20 mg, oral, Daily    magnesium oxide (Mag-Ox) 400 mg (241.3 mg magnesium) tablet 1 tablet, oral, Daily    metFORMIN (Glucophage) 500 mg tablet TAKE 1 TABLET BY MOUTH  "TWO TIMES A DAY WITH MEALS    metoprolol tartrate (Lopressor) 50 mg tablet TAKE 1 TABLET BY MOUTH EVERY 12 HOURS    NIFEdipine ER (ADALAT CC) 30 mg, oral, Daily    sulfaSALAzine (Azulfidine) 500 mg tablet TAKE 2 TABLETS BY MOUTH THREE TIMES A DAY    warfarin (Coumadin) 2.5 mg tablet Take 1.25mg (1/2 tablet ) by mouth Tuesdays and Thursdays and 2.5mg (1 tablet) by mouth all other days or as directed by Coumadin clinic        PHYSICAL EXAM:  There were no vitals taken for this visit.  Constitutional: Patient appears well-developed and well-nourished. No distress.    Pulmonary/Chest: Effort normal. No respiratory distress.   Abdominal: Soft, ND NT  : WNL  Musculoskeletal: Normal range of motion.    Neurological: Alert and oriented to person, place, and time.  Psychiatric: Normal mood and affect. Behavior is normal. Thought content normal.      Labs:  No results found for: \"TESTOSTERONE\"  Lab Results   Component Value Date    PSA 10.66 (H) 08/03/2024     Component  Ref Range & Units 5 d ago  (8/3/24) 6 mo ago  (1/29/24) 1 yr ago  (12/10/22) 2 yr ago  (5/28/22) 3 yr ago  (6/5/21) 3 yr ago  (3/22/21) 3 yr ago  (10/31/20)   Prostate Specific AG  <=4.00 ng/mL 10.66 High  7.80 High  5.18 High  R, CM 4.10 High  R, CM 5.31 High  R, CM 4.88 High  R, CM 3.83 R, CM     No components found for: \"CBC\"  Lab Results   Component Value Date    CREATININE 0.90 06/11/2024     No components found for: \"TESTOTMS\"  No results found for: \"TESTF\"    Imaging:  - MRI of prostate on 2/23/22 demonstrated  Unchanged PI rads 3 lesion in the left lateral transitional zone of the mid to apex of the prostate compared to 2020 MRI, as described and PI RADS 2 changes.  - Results reviewed with patient. Patient reassured.     Discussion:  Doing well, no complaints. Denies any dysuria, hematuria, bothersome urinary frequency, urgency, or flank pain. Patient denies any pushing or straining to urinate. Feels he is fully emptying bladder. He has ED but it is " not a priority at thistime.Will check PVR today. PSA reviewed, has gone up. With elevation in PSA, recommend proceeding with a prostate MRI. Will also get a urine culture for UA today to rule out infections. Will follow up with MRI and PSA results.  PVR was 160cc in clinic today. Will try daily dosing Cialis 5 mg, denies nitrates. Discussed he can titrate Cialis up to 20 mg, but hold the 5 mg the next day. Patient is aware he cannot take both Cialis and Sildenafil at the same time.      Assessment:      1. Lower urinary tract symptoms (LUTS)  Measure post void residual      2. Adenocarcinoma of prostate (Multi)  PSA    MR prostate with walker boundaries if pirads 3 or above    Urine culture    PSA    Urine culture      3. Bacteriuria  Urine culture    Urine culture          Kiko Petersen is a 63 y.o. male here for FUV     Plan:   1) Prostate MRI and PSA and follow up with results  2) UA and PVR  3) Rx Cialis 5mg   All questions and concerns were addressed. Patient verbalizes understanding and has no other questions at this time.     Scribe Attestation:  By signing my name below, Tiffanie AVALOS, Scribe   attest that this documentation has been prepared under the direction and in the presence of René Lyle MD.

## 2024-08-09 ENCOUNTER — OFFICE VISIT (OUTPATIENT)
Dept: UROLOGY | Facility: HOSPITAL | Age: 64
End: 2024-08-09
Payer: MEDICARE

## 2024-08-09 DIAGNOSIS — Z12.11 SPECIAL SCREENING FOR MALIGNANT NEOPLASMS, COLON: ICD-10-CM

## 2024-08-09 DIAGNOSIS — C61 ADENOCARCINOMA OF PROSTATE (MULTI): ICD-10-CM

## 2024-08-09 DIAGNOSIS — R82.71 BACTERIURIA: ICD-10-CM

## 2024-08-09 DIAGNOSIS — R39.9 LOWER URINARY TRACT SYMPTOMS (LUTS): Primary | ICD-10-CM

## 2024-08-09 PROCEDURE — 99214 OFFICE O/P EST MOD 30 MIN: CPT | Performed by: UROLOGY

## 2024-08-09 PROCEDURE — 3044F HG A1C LEVEL LT 7.0%: CPT | Performed by: UROLOGY

## 2024-08-09 PROCEDURE — 4010F ACE/ARB THERAPY RXD/TAKEN: CPT | Performed by: UROLOGY

## 2024-08-09 PROCEDURE — G2211 COMPLEX E/M VISIT ADD ON: HCPCS | Performed by: UROLOGY

## 2024-08-09 PROCEDURE — 1123F ACP DISCUSS/DSCN MKR DOCD: CPT | Performed by: UROLOGY

## 2024-08-09 PROCEDURE — 51798 US URINE CAPACITY MEASURE: CPT | Performed by: UROLOGY

## 2024-08-09 PROCEDURE — 3048F LDL-C <100 MG/DL: CPT | Performed by: UROLOGY

## 2024-08-09 RX ORDER — POLYETHYLENE GLYCOL 3350, SODIUM SULFATE ANHYDROUS, SODIUM BICARBONATE, SODIUM CHLORIDE, POTASSIUM CHLORIDE 236; 22.74; 6.74; 5.86; 2.97 G/4L; G/4L; G/4L; G/4L; G/4L
4 POWDER, FOR SOLUTION ORAL ONCE
Qty: 4000 ML | Refills: 0 | Status: SHIPPED | OUTPATIENT
Start: 2024-08-09 | End: 2024-08-09

## 2024-08-09 RX ORDER — TADALAFIL 5 MG/1
5 TABLET ORAL DAILY
Qty: 90 TABLET | Refills: 0 | OUTPATIENT
Start: 2024-08-09 | End: 2024-11-07

## 2024-08-14 ENCOUNTER — APPOINTMENT (OUTPATIENT)
Dept: PHARMACY | Facility: HOSPITAL | Age: 64
End: 2024-08-14
Payer: MEDICARE

## 2024-08-14 DIAGNOSIS — R39.9 LOWER URINARY TRACT SYMPTOMS (LUTS): ICD-10-CM

## 2024-08-14 DIAGNOSIS — C61 ADENOCARCINOMA OF PROSTATE (MULTI): ICD-10-CM

## 2024-08-14 DIAGNOSIS — I48.91 ATRIAL FIBRILLATION, UNSPECIFIED TYPE (MULTI): Primary | ICD-10-CM

## 2024-08-14 LAB
POC INR: 3.2 (ref 2–3)
POC PROTHROMBIN TIME: 38.2

## 2024-08-14 PROCEDURE — 85610 PROTHROMBIN TIME: CPT | Mod: QW | Performed by: INTERNAL MEDICINE

## 2024-08-14 PROCEDURE — 99211 OFF/OP EST MAY X REQ PHY/QHP: CPT

## 2024-08-14 NOTE — PATIENT INSTRUCTIONS
Your INR was slightly high at 3.2. Please eat some greens tonight and one day this weekend, then continue your weekly regimen to 1.25mg. Next INR check on next Wednesday.   If any questions arise do not hesitate to call us at [phone] M-F from 8:30am-4:30pm or at   [phone] after 5pm or on the weekends. Continue to monitor for any excess bleeding or bruising, especially for blood in your stool.

## 2024-08-14 NOTE — PROGRESS NOTES
Mr. Petersen is a consult from Dr. Whelan to our clinic for the management of his warfarin therapy due to his history of atrial fibrillation. He has previously been taking Eliquis 5mg tablets. No other changes to his medications.  No signs or symptoms of bleeding.  This past week he didn't eat any greens since his past INR was low. Today his INR is slightly high at 3.2. Given his INR is slightly high, we will have him eat 2 extra servings of greens this week and then continue his weekly regimen of 1.25mg daily. Follow up in 1 week to recheck his INR.

## 2024-08-21 ENCOUNTER — APPOINTMENT (OUTPATIENT)
Dept: PHARMACY | Facility: HOSPITAL | Age: 64
End: 2024-08-21
Payer: MEDICARE

## 2024-08-21 DIAGNOSIS — I48.91 ATRIAL FIBRILLATION, UNSPECIFIED TYPE (MULTI): ICD-10-CM

## 2024-08-21 DIAGNOSIS — I48.91 ATRIAL FIBRILLATION, UNSPECIFIED TYPE (MULTI): Primary | ICD-10-CM

## 2024-08-21 DIAGNOSIS — R00.0 TACHYCARDIA: Primary | ICD-10-CM

## 2024-08-21 DIAGNOSIS — I10 ESSENTIAL HYPERTENSION: ICD-10-CM

## 2024-08-21 LAB
POC INR: 1.6 (ref 2–3)
POC PROTHROMBIN TIME: 19

## 2024-08-21 PROCEDURE — 99211 OFF/OP EST MAY X REQ PHY/QHP: CPT

## 2024-08-21 PROCEDURE — 85610 PROTHROMBIN TIME: CPT | Mod: QW

## 2024-08-21 RX ORDER — METOPROLOL TARTRATE 50 MG/1
50 TABLET ORAL EVERY 12 HOURS
Qty: 180 TABLET | Refills: 3 | Status: SHIPPED | OUTPATIENT
Start: 2024-08-21 | End: 2025-08-21

## 2024-08-21 NOTE — PROGRESS NOTES
Notes: Mr. Petersen is a consult from Dr. Whelan to our clinic for the management of his warfarin therapy due to his history of atrial fibrillation. He has previously been taking Eliquis 5mg tablets. No other changes to his medications or diet.  No signs or symptoms of bleeding. He said he rarely eats greens. This past week he has eaten 2 servings of spinach spread out over Thursday and Monday of last week. Today his INR is low at 1.6. Given his INR is low, we will have him take 2.5 mg today, then continue his weekly regimen of 1.25mg daily. We will also try to even out greens intake over the week (splitting a can of spinach over 3 days in the week). Follow up in 1 week to recheck his INR.

## 2024-08-21 NOTE — PATIENT INSTRUCTIONS
Your INR was low at 1.6. Please eat some greens three days spaced out this week. Take 2.5 mg tonight, then continue your weekly regimen of 1.25mg. Next INR check on next Wednesday.   If any questions arise do not hesitate to call us at [phone] M-F from 8:30am-4:30pm or at   [phone] after 5pm or on the weekends. Continue to monitor for any excess bleeding or bruising, especially for blood in your stool.

## 2024-08-28 ENCOUNTER — APPOINTMENT (OUTPATIENT)
Dept: PHARMACY | Facility: HOSPITAL | Age: 64
End: 2024-08-28
Payer: MEDICARE

## 2024-08-28 DIAGNOSIS — I48.91 ATRIAL FIBRILLATION, UNSPECIFIED TYPE (MULTI): Primary | ICD-10-CM

## 2024-08-28 LAB
POC INR: 1.7 (ref 2–3)
POC PROTHROMBIN TIME: 20.4

## 2024-08-28 PROCEDURE — 99211 OFF/OP EST MAY X REQ PHY/QHP: CPT

## 2024-08-28 PROCEDURE — 85610 PROTHROMBIN TIME: CPT | Mod: QW

## 2024-08-28 NOTE — PROGRESS NOTES
Mr. Petersen is a consult from Dr. Whelan to our clinic for the management of his warfarin therapy due to his history of atrial fibrillation. He has previously been taking Eliquis 5mg tablets. No other changes to his medications or diet.  No signs or symptoms of bleeding. He said he rarely eats greens. This week he didn't eat as much greens and doesn't want any greens this week. Today his INR is low at 1.7. Given his INR is low, we will have him take 1.875 mg today, then continue is weekly regimen of 1.25mg daily. Follow up in 1 week to recheck his INR.

## 2024-08-28 NOTE — PATIENT INSTRUCTIONS
Your INR was low at 1.7. Take ¾ of a tablet (1.875 mg) tonight, then continue your weekly regimen of 1.25mg. Next INR check on next Wednesday.   If any questions arise do not hesitate to call us at [phone] M-F from 8:30am-4:30pm or at   [phone] after 5pm or on the weekends. Continue to monitor for any excess bleeding or bruising, especially for blood in your stool.   
Zachary [phone]/No

## 2024-08-30 ENCOUNTER — HOSPITAL ENCOUNTER (OUTPATIENT)
Dept: RADIOLOGY | Facility: HOSPITAL | Age: 64
Discharge: HOME | End: 2024-08-30
Payer: MEDICARE

## 2024-08-30 DIAGNOSIS — C61 ADENOCARCINOMA OF PROSTATE (MULTI): ICD-10-CM

## 2024-08-30 PROCEDURE — 72197 MRI PELVIS W/O & W/DYE: CPT

## 2024-08-30 PROCEDURE — 2550000001 HC RX 255 CONTRASTS: Performed by: UROLOGY

## 2024-08-30 PROCEDURE — A9575 INJ GADOTERATE MEGLUMI 0.1ML: HCPCS | Performed by: UROLOGY

## 2024-08-30 RX ORDER — GADOTERATE MEGLUMINE 376.9 MG/ML
17 INJECTION INTRAVENOUS
Status: COMPLETED | OUTPATIENT
Start: 2024-08-30 | End: 2024-08-30

## 2024-09-04 ENCOUNTER — APPOINTMENT (OUTPATIENT)
Dept: PHARMACY | Facility: HOSPITAL | Age: 64
End: 2024-09-04
Payer: MEDICARE

## 2024-09-04 DIAGNOSIS — I48.91 ATRIAL FIBRILLATION, UNSPECIFIED TYPE (MULTI): Primary | ICD-10-CM

## 2024-09-04 LAB
POC INR: 3.2 (ref 2–3)
POC PROTHROMBIN TIME: 38.1

## 2024-09-04 PROCEDURE — 85610 PROTHROMBIN TIME: CPT | Mod: QW | Performed by: INTERNAL MEDICINE

## 2024-09-04 PROCEDURE — 99211 OFF/OP EST MAY X REQ PHY/QHP: CPT

## 2024-09-04 NOTE — PATIENT INSTRUCTIONS
Your INR was high at 3.2. Please continue your weekly regimen of 1.25mg. Next INR check on next Wednesday.   If any questions arise do not hesitate to call us at [phone] M-F from 8:30am-4:30pm or at   [phone] after 5pm or on the weekends. Continue to monitor for any excess bleeding or bruising, especially for blood in your stool.

## 2024-09-04 NOTE — PROGRESS NOTES
Notes: Mr. Petersen is a consult from Dr. Whelan to our clinic for the management of his warfarin therapy due to his history of atrial fibrillation. He has previously been taking Eliquis 5mg tablets. No other changes to his medications. His appetite has been decreased due to some nausea this week.  No signs or symptoms of bleeding. He said he has cut out greens this week. Today his INR is high at 3.2. Given his INR is high after a boost last week and cutting out greens, we will have him continue is weekly regimen of 1.25mg daily. Follow up in 1 week to recheck his INR.

## 2024-09-05 RX ORDER — TADALAFIL 5 MG/1
5 TABLET ORAL DAILY
Qty: 90 TABLET | Refills: 0 | Status: SHIPPED | OUTPATIENT
Start: 2024-09-05

## 2024-09-06 ENCOUNTER — DOCUMENTATION (OUTPATIENT)
Dept: PHARMACY | Facility: HOSPITAL | Age: 64
End: 2024-09-06
Payer: MEDICARE

## 2024-09-06 NOTE — PROGRESS NOTES
Based on Kiko Petersen 's recent medical history, it looks like Kiko Petersen could be eligible to switch from warfarin to a direct oral anticoagulant (DOAC).    To facilitate this transition smoothly, the [facility] Clinical Pharmacy team is available to assist with the conversion process. The pharmacists can provide support with medication selection, dosing adjustments, financial aid, and patient education to ensure a seamless switch from warfarin to a DOAC.    Warfarin Managing Provider: Bashir Whelan   Managing Provider Specialty: Cardiology     If the the managing provider is interested in pursuing this option, please see instructions below:     Review patient's chart and ensure patient does not have any contraindications to DOACs. The pharmacy team has already done an initial review; however, we encourage a second opinion from the managing provider.   It is strongly recommended that the office staff inform the patient that a referral will be made to the clinical pharmacy team to manage their anticoagulation therapy change. This warm hand off significantly increases the chances of the patient scheduling and working with the pharmacist.   Enter referral to clinical pharmacy as indicated below:   Add Order: Referral to Clinical Pharmacy  Service: CARDIO  Reason for referral: Warfarin conversion - if you prefer a specific DOAC, please indicate that here  The patient will be contacted to set up a telemedicine visit with one of our clinical pharmacists to begin conversion process.   Once the pharmacist meets with the patient, the referring provider will receive ongoing updates.    If the managing provider has any questions, please feel free to reach out.     MITESH RANKIN

## 2024-09-10 ENCOUNTER — LAB (OUTPATIENT)
Dept: LAB | Facility: LAB | Age: 64
End: 2024-09-10
Payer: MEDICARE

## 2024-09-10 DIAGNOSIS — I10 ESSENTIAL HYPERTENSION: ICD-10-CM

## 2024-09-10 DIAGNOSIS — E11.9 TYPE 2 DIABETES MELLITUS WITHOUT COMPLICATION, WITHOUT LONG-TERM CURRENT USE OF INSULIN (MULTI): ICD-10-CM

## 2024-09-10 LAB
ALBUMIN SERPL BCP-MCNC: 3.8 G/DL (ref 3.4–5)
ALP SERPL-CCNC: 109 U/L (ref 33–136)
ALT SERPL W P-5'-P-CCNC: 60 U/L (ref 10–52)
ANION GAP SERPL CALC-SCNC: 17 MMOL/L (ref 10–20)
AST SERPL W P-5'-P-CCNC: 124 U/L (ref 9–39)
BASOPHILS # BLD AUTO: 0.03 X10*3/UL (ref 0–0.1)
BASOPHILS NFR BLD AUTO: 0.5 %
BILIRUB SERPL-MCNC: 2.1 MG/DL (ref 0–1.2)
BUN SERPL-MCNC: 10 MG/DL (ref 6–23)
CALCIUM SERPL-MCNC: 8.3 MG/DL (ref 8.6–10.3)
CHLORIDE SERPL-SCNC: 87 MMOL/L (ref 98–107)
CHOLEST SERPL-MCNC: 145 MG/DL (ref 0–199)
CHOLESTEROL/HDL RATIO: 1.3
CO2 SERPL-SCNC: 26 MMOL/L (ref 21–32)
CREAT SERPL-MCNC: 0.69 MG/DL (ref 0.5–1.3)
EGFRCR SERPLBLD CKD-EPI 2021: >90 ML/MIN/1.73M*2
EOSINOPHIL # BLD AUTO: 0.01 X10*3/UL (ref 0–0.7)
EOSINOPHIL NFR BLD AUTO: 0.2 %
ERYTHROCYTE [DISTWIDTH] IN BLOOD BY AUTOMATED COUNT: 14 % (ref 11.5–14.5)
GLUCOSE SERPL-MCNC: 133 MG/DL (ref 74–99)
HCT VFR BLD AUTO: 36.3 % (ref 41–52)
HDLC SERPL-MCNC: 109.5 MG/DL
HGB BLD-MCNC: 12.6 G/DL (ref 13.5–17.5)
IMM GRANULOCYTES # BLD AUTO: 0.03 X10*3/UL (ref 0–0.7)
IMM GRANULOCYTES NFR BLD AUTO: 0.5 % (ref 0–0.9)
LDLC SERPL CALC-MCNC: 25 MG/DL
LYMPHOCYTES # BLD AUTO: 0.5 X10*3/UL (ref 1.2–4.8)
LYMPHOCYTES NFR BLD AUTO: 7.8 %
MCH RBC QN AUTO: 34.1 PG (ref 26–34)
MCHC RBC AUTO-ENTMCNC: 34.7 G/DL (ref 32–36)
MCV RBC AUTO: 98 FL (ref 80–100)
MONOCYTES # BLD AUTO: 0.83 X10*3/UL (ref 0.1–1)
MONOCYTES NFR BLD AUTO: 12.9 %
NEUTROPHILS # BLD AUTO: 5.03 X10*3/UL (ref 1.2–7.7)
NEUTROPHILS NFR BLD AUTO: 78.1 %
NON HDL CHOLESTEROL: 36 MG/DL (ref 0–149)
NRBC BLD-RTO: 0 /100 WBCS (ref 0–0)
PLATELET # BLD AUTO: 199 X10*3/UL (ref 150–450)
POTASSIUM SERPL-SCNC: 4.5 MMOL/L (ref 3.5–5.3)
PROT SERPL-MCNC: 6.8 G/DL (ref 6.4–8.2)
PSA SERPL-MCNC: 12.56 NG/ML
RBC # BLD AUTO: 3.69 X10*6/UL (ref 4.5–5.9)
SODIUM SERPL-SCNC: 125 MMOL/L (ref 136–145)
TRIGL SERPL-MCNC: 52 MG/DL (ref 0–149)
VLDL: 10 MG/DL (ref 0–40)
WBC # BLD AUTO: 6.4 X10*3/UL (ref 4.4–11.3)

## 2024-09-10 PROCEDURE — 84153 ASSAY OF PSA TOTAL: CPT

## 2024-09-10 PROCEDURE — 85025 COMPLETE CBC W/AUTO DIFF WBC: CPT

## 2024-09-10 PROCEDURE — 80061 LIPID PANEL: CPT

## 2024-09-10 PROCEDURE — 83036 HEMOGLOBIN GLYCOSYLATED A1C: CPT

## 2024-09-10 PROCEDURE — 80053 COMPREHEN METABOLIC PANEL: CPT

## 2024-09-10 PROCEDURE — 36415 COLL VENOUS BLD VENIPUNCTURE: CPT

## 2024-09-11 ENCOUNTER — APPOINTMENT (OUTPATIENT)
Dept: PHARMACY | Facility: HOSPITAL | Age: 64
End: 2024-09-11
Payer: MEDICARE

## 2024-09-11 DIAGNOSIS — I48.91 ATRIAL FIBRILLATION, UNSPECIFIED TYPE (MULTI): Primary | ICD-10-CM

## 2024-09-11 LAB
EST. AVERAGE GLUCOSE BLD GHB EST-MCNC: 111 MG/DL
HBA1C MFR BLD: 5.5 %
POC INR: 3 (ref 2–3)
POC PROTHROMBIN TIME: 35.6

## 2024-09-11 PROCEDURE — 85610 PROTHROMBIN TIME: CPT | Mod: QW | Performed by: INTERNAL MEDICINE

## 2024-09-11 PROCEDURE — 99211 OFF/OP EST MAY X REQ PHY/QHP: CPT

## 2024-09-11 NOTE — PATIENT INSTRUCTIONS
Your INR was in range at 3.0. Please continue your weekly regimen of 1.25mg. Next INR check on next Wednesday.   If any questions arise do not hesitate to call us at [phone] M-F from 8:30am-4:30pm or at   [phone] after 5pm or on the weekends. Continue to monitor for any excess bleeding or bruising, especially for blood in your stool.

## 2024-09-11 NOTE — PROGRESS NOTES
Mr. Petersen is a consult from Dr. Whelan to our clinic for the management of his warfarin therapy due to his history of atrial fibrillation. He has previously been taking Eliquis 5mg tablets. No other changes to his medications. His appetite has been decreased due to some nausea lately. He said his appetite is better this week compared to last week. He had no greens this week.  He reported every year around this time of the season his allergies become bothersome and appetite tends to decrease. He typically drinks beer about 2-3 drinks a week. No signs or symptoms of bleeding. He does have a bruise on his arm from scratching it very hard. We discussed to monitor that it starts improving over the week. Today his INR is in range at 3.0. Given his INR is in range, we will have him continue is weekly regimen of 1.25mg daily. Follow up in 1 week to recheck his INR.

## 2024-09-12 ENCOUNTER — TELEPHONE (OUTPATIENT)
Dept: PRIMARY CARE | Facility: CLINIC | Age: 64
End: 2024-09-12

## 2024-09-12 ENCOUNTER — LAB (OUTPATIENT)
Dept: LAB | Facility: LAB | Age: 64
End: 2024-09-12
Payer: MEDICARE

## 2024-09-12 ENCOUNTER — APPOINTMENT (OUTPATIENT)
Dept: PRIMARY CARE | Facility: CLINIC | Age: 64
End: 2024-09-12
Payer: MEDICARE

## 2024-09-12 VITALS
WEIGHT: 183.4 LBS | SYSTOLIC BLOOD PRESSURE: 110 MMHG | TEMPERATURE: 97.6 F | BODY MASS INDEX: 26.32 KG/M2 | DIASTOLIC BLOOD PRESSURE: 58 MMHG | HEART RATE: 105 BPM | OXYGEN SATURATION: 95 %

## 2024-09-12 DIAGNOSIS — K76.6 PORTAL HYPERTENSION (MULTI): ICD-10-CM

## 2024-09-12 DIAGNOSIS — Z85.46 HISTORY OF PROSTATE CANCER: ICD-10-CM

## 2024-09-12 DIAGNOSIS — Z96.642 S/P TOTAL LEFT HIP ARTHROPLASTY: ICD-10-CM

## 2024-09-12 DIAGNOSIS — E83.110 HEREDITARY HEMOCHROMATOSIS (CMS-HCC): ICD-10-CM

## 2024-09-12 DIAGNOSIS — G47.33 OSA ON CPAP: ICD-10-CM

## 2024-09-12 DIAGNOSIS — K51.90 ULCERATIVE COLITIS WITHOUT COMPLICATIONS, UNSPECIFIED LOCATION (MULTI): ICD-10-CM

## 2024-09-12 DIAGNOSIS — E11.9 TYPE 2 DIABETES MELLITUS WITHOUT COMPLICATION, WITHOUT LONG-TERM CURRENT USE OF INSULIN (MULTI): Primary | ICD-10-CM

## 2024-09-12 DIAGNOSIS — I10 ESSENTIAL HYPERTENSION: ICD-10-CM

## 2024-09-12 LAB
IRON SATN MFR SERPL: 33 % (ref 25–45)
IRON SERPL-MCNC: 100 UG/DL (ref 35–150)
TIBC SERPL-MCNC: 301 UG/DL (ref 240–445)
UIBC SERPL-MCNC: 201 UG/DL (ref 110–370)

## 2024-09-12 PROCEDURE — 3074F SYST BP LT 130 MM HG: CPT | Performed by: INTERNAL MEDICINE

## 2024-09-12 PROCEDURE — 99215 OFFICE O/P EST HI 40 MIN: CPT | Performed by: INTERNAL MEDICINE

## 2024-09-12 PROCEDURE — G2211 COMPLEX E/M VISIT ADD ON: HCPCS | Performed by: INTERNAL MEDICINE

## 2024-09-12 PROCEDURE — 3044F HG A1C LEVEL LT 7.0%: CPT | Performed by: INTERNAL MEDICINE

## 2024-09-12 PROCEDURE — 36415 COLL VENOUS BLD VENIPUNCTURE: CPT

## 2024-09-12 PROCEDURE — 1159F MED LIST DOCD IN RCRD: CPT | Performed by: INTERNAL MEDICINE

## 2024-09-12 PROCEDURE — 83550 IRON BINDING TEST: CPT

## 2024-09-12 PROCEDURE — 1123F ACP DISCUSS/DSCN MKR DOCD: CPT | Performed by: INTERNAL MEDICINE

## 2024-09-12 PROCEDURE — 4010F ACE/ARB THERAPY RXD/TAKEN: CPT | Performed by: INTERNAL MEDICINE

## 2024-09-12 PROCEDURE — 3078F DIAST BP <80 MM HG: CPT | Performed by: INTERNAL MEDICINE

## 2024-09-12 PROCEDURE — 3048F LDL-C <100 MG/DL: CPT | Performed by: INTERNAL MEDICINE

## 2024-09-12 PROCEDURE — 83540 ASSAY OF IRON: CPT

## 2024-09-12 RX ORDER — METFORMIN HYDROCHLORIDE 500 MG/1
500 TABLET ORAL
Qty: 180 TABLET | Refills: 0 | Status: SHIPPED | OUTPATIENT
Start: 2024-09-12

## 2024-09-12 RX ORDER — LISINOPRIL 20 MG/1
20 TABLET ORAL DAILY
Qty: 90 TABLET | Refills: 0 | Status: SHIPPED | OUTPATIENT
Start: 2024-09-12

## 2024-09-12 RX ORDER — NIFEDIPINE 30 MG/1
30 TABLET, FILM COATED, EXTENDED RELEASE ORAL DAILY
Qty: 90 TABLET | Refills: 0 | Status: SHIPPED | OUTPATIENT
Start: 2024-09-12

## 2024-09-12 ASSESSMENT — ENCOUNTER SYMPTOMS
PSYCHIATRIC NEGATIVE: 1
RESPIRATORY NEGATIVE: 1
HEMATOLOGIC/LYMPHATIC NEGATIVE: 1
GASTROINTESTINAL NEGATIVE: 1
ALLERGIC/IMMUNOLOGIC NEGATIVE: 1
CONSTITUTIONAL NEGATIVE: 1
CARDIOVASCULAR NEGATIVE: 1
EYES NEGATIVE: 1
ENDOCRINE NEGATIVE: 1
MUSCULOSKELETAL NEGATIVE: 1
NEUROLOGICAL NEGATIVE: 1

## 2024-09-12 NOTE — PROGRESS NOTES
Virtual or Telephone Consent    An interactive audio and video telecommunication system which permits real time communications between the patient (at the originating site) and provider (at the distant site) was utilized to provide this telehealth service.   Verbal consent was requested and obtained from Kiko Petersen on this date, 09/13/24 for a telehealth visit.   FUV    Last seen - 8/9/24     HISTORY OF PRESENT ILLNESS:   Kiko Petersen is a 64 y.o. male who is being seen today for PSA results and follow up.    PAST MEDICAL HISTORY:  Past Medical History:   Diagnosis Date    Diabetes mellitus (Multi)     Encounter for screening for malignant neoplasm of colon 07/30/2019    Encounter for screening colonoscopy    Hypertension     Unspecified cataract     Cataract, right eye    Urinary tract infection, site not specified 03/02/2020    UTI (urinary tract infection), bacterial   - history of BPH  - elevated PSA with PI-RADS 3 on MRI 03/20/19  - [place] 3+3=6 prostate cancer Dx on 4/19/19, with PI-RADS 3 on MRI 03/27/2020 and ASAP on restaging on prostate biopsy 05/07/2020  - On active surveillance    PAST SURGICAL HISTORY:  Past Surgical History:   Procedure Laterality Date    CARDIAC ELECTROPHYSIOLOGY PROCEDURE N/A 3/12/2024    Procedure: Ablation A-Fib;  Surgeon: Jaylen Guerrero MD;  Location: [facility];  Service: Electrophysiology;  Laterality: N/A;  atrial fibrillation ablation, CARTO, general anesthesia, hold eliquis morning of procedure    EXCISION / REPAIR HYDROCELE PEDIATRIC      HERNIA REPAIR  10/14/2015    Inguinal Hernia Repair    REPLACEMENT TOTAL HIP LATERAL POSITION Left         ALLERGIES:   Allergies   Allergen Reactions    Penicillins Other, Swelling, Unknown and Anaphylaxis     Cefazolin tolerated for post-op prophylaxis following ortho procedure    Erythromycin Diarrhea        MEDICATIONS:   Current Outpatient Medications   Medication Instructions    folic acid (Folvite) 1 mg  "tablet TAKE 1 TABLET BY MOUTH ONCE DAILY    ketoconazole (NIZOral) 2 % cream Topical    Lactobacillus acidophilus (Acidophilus) capsule 1 capsule, oral, Daily    Lasix 20 mg, oral, Daily    lisinopril 20 mg, oral, Daily    magnesium oxide (Mag-Ox) 400 mg (241.3 mg magnesium) tablet 1 tablet, oral, Daily    metFORMIN (Glucophage) 500 mg tablet TAKE 1 TABLET BY MOUTH TWO TIMES A DAY WITH MEALS    metoprolol tartrate (Lopressor) 50 mg tablet TAKE 1 TABLET BY MOUTH EVERY 12 HOURS    NIFEdipine ER (ADALAT CC) 30 mg, oral, Daily    sulfaSALAzine (Azulfidine) 500 mg tablet TAKE 2 TABLETS BY MOUTH THREE TIMES A DAY    tadalafil (CIALIS) 5 mg, oral, Daily    warfarin (Coumadin) 2.5 mg tablet Take 1.25mg (1/2 tablet ) by mouth Tuesdays and Thursdays and 2.5mg (1 tablet) by mouth all other days or as directed by Coumadin clinic        PHYSICAL EXAM:  There were no vitals taken for this visit.  Constitutional: Patient appears well-developed and well-nourished. No distress.    Pulmonary/Chest: Effort normal. No respiratory distress.   Abdominal: Soft, ND NT  : WNL  Musculoskeletal: Normal range of motion.    Neurological: Alert and oriented to person, place, and time.  Psychiatric: Normal mood and affect. Behavior is normal. Thought content normal.      Labs:  No results found for: \"TESTOSTERONE\"  Lab Results   Component Value Date    PSA 12.56 (H) 09/10/2024     Component  Ref Range & Units   (8/3/24) 6 mo ago  (1/29/24) 1 yr ago  (12/10/22) 2 yr ago  (5/28/22) 3 yr ago  (6/5/21) 3 yr ago  (3/22/21) 3 yr ago  (10/31/20)   Prostate Specific AG  <=4.00 ng/mL 10.66 High  7.80 High  5.18 High  R, CM 4.10 High  R, CM 5.31 High  R, CM 4.88 High  R, CM 3.83 R, CM     No components found for: \"CBC\"  Lab Results   Component Value Date    CREATININE 0.69 09/10/2024     No components found for: \"TESTOTMS\"  No results found for: \"TESTF\"    Imaging:  - MRI of prostate on 9/3/24 demonstrated  Overall stable appearance of a PI-RADS 3 lesion " in the left lateral transitional zone in the mid to apical prostate as compared to the 2022 MRI. No new lesion is identified. No pelvic lymphadenopathy. No focal osseous metastatic disease.    Discussion:  Doing well, no complaints. Denies any dysuria, hematuria, bothersome urinary frequency, urgency, or flank pain. Mentions he has to wait for stream to start and has a very slow stream.  He is on both Tamsulosin 0.4mg and Cialis. Recommend increasing Tamsulosin to twice a day. PSA elevated on recent lab. Results reviewed with patient. Patient reassured.   Will proceed with a biopsy. Patient is on warfarin. Will stop 1 week prior to biopsy. R/b/a's discussed. Patient in agreement.   Assessment:      1. Lower urinary tract symptoms (LUTS)        2. Adenocarcinoma of prostate (Multi)  Biopsy prostate            Kiko Petersen is a 64 y.o. male here for FUV     Plan:   1) schedule biopsy and follow up 2 weeks after to discuss results  2) Increase Tamsulosin to twice a day   All questions and concerns were addressed. Patient verbalizes understanding and has no other questions at this time.     Scribe Attestation:  By signing my name below, Tiffanie AVALOS Scribe   attest that this documentation has been prepared under the direction and in the presence of René Lyle MD.

## 2024-09-12 NOTE — TELEPHONE ENCOUNTER
Per Dr. Paolo Rogers , patient established with Dr Ciaran Patel a hepatologist and this time when the blood work was done he noticed a double in his liver testing including bilirubin.  Called [phone] and spoke to Josse and he did not have a opening till December.  She gave me his nurse line of [phone] and I left a message to see if this needs escalated.  Received a call back and they could get in with one of the other providers in the practice Wednesday 9-25-24 at 9:40 in [facility] so I took that appointment.  Called and notified the patient

## 2024-09-12 NOTE — PROGRESS NOTES
Subjective   Patient ID: Kiko Petersen is a 64 y.o. male who presents for 3 month follow up.  Patient presents in follow up regarding hypertension.  Blood pressure has been well controlled on current therapy.  No adverse effects of medication reported.  Patient denies chest pain, palpitations, shortness of breath, orthopnea, fatigue or edema.    Patient presents in follow up re:  Type 2 diabetes.  Patient has been compliant with medical therapy as prescribed and mostly compliant with diet and exercise recommendations.  HgbA1c has been maintained at goal level.  No hypoglycemia reported and no other associated complaints.          Review of Systems   Constitutional: Negative.    HENT: Negative.     Eyes: Negative.    Respiratory: Negative.     Cardiovascular: Negative.    Gastrointestinal: Negative.    Endocrine: Negative.    Genitourinary: Negative.    Musculoskeletal: Negative.    Skin: Negative.    Allergic/Immunologic: Negative.    Neurological: Negative.    Hematological: Negative.    Psychiatric/Behavioral: Negative.         Objective     Blood pressure 110/58, pulse 105, temperature 36.4 °C (97.6 °F), temperature source Temporal, weight 83.2 kg (183 lb 6.4 oz), SpO2 95%.   Physical Exam  Constitutional:       Appearance: Normal appearance.   Neck:      Vascular: No carotid bruit.   Cardiovascular:      Rate and Rhythm: Normal rate. Rhythm irregular.      Pulses: Normal pulses.      Heart sounds: Normal heart sounds. No murmur heard.  Pulmonary:      Effort: Pulmonary effort is normal.      Breath sounds: Normal breath sounds. No wheezing or rales.   Abdominal:      General: Abdomen is flat. There is no distension.      Palpations: Abdomen is soft.      Tenderness: There is no abdominal tenderness.   Musculoskeletal:         General: Normal range of motion.      Cervical back: Normal range of motion and neck supple.   Skin:     General: Skin is warm and dry.   Neurological:      General: No focal deficit  present.      Mental Status: He is alert and oriented to person, place, and time.   Psychiatric:         Mood and Affect: Mood normal.         Behavior: Behavior normal.         Assessment/Plan   Problem List Items Addressed This Visit       Ulcerative colitis (Multi)    Relevant Orders    CBC and Auto Differential    Type 2 diabetes mellitus without complication, without long-term current use of insulin (Multi) - Primary    Relevant Medications    metFORMIN (Glucophage) 500 mg tablet    Other Relevant Orders    Hemoglobin A1C    Comprehensive Metabolic Panel    Lipid Panel    BENNY on CPAP    Hereditary hemochromatosis (CMS-HCC)    Relevant Orders    CBC and Auto Differential    Iron and TIBC    Essential hypertension    Relevant Medications    NIFEdipine ER (NIFEdipine CC) 30 mg 24 hr tablet    lisinopril 20 mg tablet    Other Relevant Orders    CBC and Auto Differential    Comprehensive Metabolic Panel    Portal hypertension (Multi)    History of prostate cancer     Other Visit Diagnoses       S/P total left hip arthroplasty                Sugars well controlled overall with A1c at goal.  Will continue present medical therapy and follow up.  BP well controlled on current therapy.  Will continue present therapy and follow.  Edema improved with reduction in nifedipine dose.  Lipid levels are excellent without intervention and cardiac eval negative other than the hx of a-fib, so absolutely no indication for statin therapy.  He continues to follow with Cardiology re:  hx of a-fib.  He is s/p ablation and doing very well.  He follows with GI re:  ulcerative colitis and hepatic pathology.  I note that transaminases and bilirubin are elevated on recent lab.  He is advised to see GI ASAP  Pt. continues to use CPAP nightly and is tolerating well.  Advised to continue treatment and will continue to follow clinically.   He is doing well s/p left hip arthroplasty.  He had colonoscopy in 8/2022.  He follows with Dr. Lyle re:   hx of prostate cancer and it is noted that there has been a significant rise in PSA.  He also follows with Hepatology, Dr. Ciaran Patel, for chronic liver issues.  See above.  We called to try and expedite getting a visit for him.  Of note, patient reports that he has not seen Hematology in about a year and thus iron level has not been checked.  Will order iron check to be done at lab today.    Follow up in 3 months  Lab to be drawn 1 week prior to next office visit.     Addendum:  contact was made with Dr. Patel's office and we were told he could not be seen until December.  We are trying to make direct contact with office staff.  In the interim, I have messaged Dr. Patel via Epic chat function.    >45 minutes was spent with the patient today, the majority of which was spent on review of history and medications as well as counselling on care plan and/or coordination of care.

## 2024-09-13 ENCOUNTER — TELEPHONE (OUTPATIENT)
Dept: PRIMARY CARE | Facility: CLINIC | Age: 64
End: 2024-09-13

## 2024-09-13 ENCOUNTER — APPOINTMENT (OUTPATIENT)
Dept: UROLOGY | Facility: HOSPITAL | Age: 64
End: 2024-09-13
Payer: MEDICARE

## 2024-09-13 DIAGNOSIS — R39.9 LOWER URINARY TRACT SYMPTOMS (LUTS): ICD-10-CM

## 2024-09-13 DIAGNOSIS — I48.0 PAROXYSMAL ATRIAL FIBRILLATION (MULTI): ICD-10-CM

## 2024-09-13 DIAGNOSIS — Z96.642 S/P TOTAL LEFT HIP ARTHROPLASTY: Primary | ICD-10-CM

## 2024-09-13 DIAGNOSIS — C61 ADENOCARCINOMA OF PROSTATE (MULTI): ICD-10-CM

## 2024-09-13 PROCEDURE — 99214 OFFICE O/P EST MOD 30 MIN: CPT | Performed by: UROLOGY

## 2024-09-13 PROCEDURE — 1123F ACP DISCUSS/DSCN MKR DOCD: CPT | Performed by: UROLOGY

## 2024-09-13 PROCEDURE — G2211 COMPLEX E/M VISIT ADD ON: HCPCS | Performed by: UROLOGY

## 2024-09-13 PROCEDURE — 3048F LDL-C <100 MG/DL: CPT | Performed by: UROLOGY

## 2024-09-13 PROCEDURE — RXMED WILLOW AMBULATORY MEDICATION CHARGE

## 2024-09-13 PROCEDURE — 3044F HG A1C LEVEL LT 7.0%: CPT | Performed by: UROLOGY

## 2024-09-13 PROCEDURE — 4010F ACE/ARB THERAPY RXD/TAKEN: CPT | Performed by: UROLOGY

## 2024-09-13 RX ORDER — TAMSULOSIN HYDROCHLORIDE 0.4 MG/1
0.8 CAPSULE ORAL DAILY
Qty: 180 CAPSULE | Refills: 3 | Status: SHIPPED | OUTPATIENT
Start: 2024-09-13 | End: 2025-09-08

## 2024-09-17 ENCOUNTER — APPOINTMENT (OUTPATIENT)
Dept: RADIOLOGY | Facility: HOSPITAL | Age: 64
End: 2024-09-17
Payer: MEDICARE

## 2024-09-17 ENCOUNTER — HOSPITAL ENCOUNTER (INPATIENT)
Facility: HOSPITAL | Age: 64
LOS: 2 days | Discharge: HOME HEALTH | End: 2024-09-20
Attending: EMERGENCY MEDICINE | Admitting: INTERNAL MEDICINE
Payer: MEDICARE

## 2024-09-17 DIAGNOSIS — E87.1 HYPONATREMIA: Primary | ICD-10-CM

## 2024-09-17 DIAGNOSIS — M25.552 LEFT HIP PAIN: ICD-10-CM

## 2024-09-17 DIAGNOSIS — G57.92 NEUROPATHY OF LEFT LOWER EXTREMITY: ICD-10-CM

## 2024-09-17 LAB
ALBUMIN SERPL BCP-MCNC: 3.6 G/DL (ref 3.4–5)
ALP SERPL-CCNC: 98 U/L (ref 33–136)
ALT SERPL W P-5'-P-CCNC: 37 U/L (ref 10–52)
ANION GAP SERPL CALC-SCNC: 17 MMOL/L (ref 10–20)
APPEARANCE UR: CLEAR
AST SERPL W P-5'-P-CCNC: 66 U/L (ref 9–39)
BASOPHILS # BLD AUTO: 0.03 X10*3/UL (ref 0–0.1)
BASOPHILS NFR BLD AUTO: 0.5 %
BILIRUB SERPL-MCNC: 1.7 MG/DL (ref 0–1.2)
BILIRUB UR STRIP.AUTO-MCNC: NEGATIVE MG/DL
BUN SERPL-MCNC: 9 MG/DL (ref 6–23)
CALCIUM SERPL-MCNC: 7.9 MG/DL (ref 8.6–10.3)
CHLORIDE SERPL-SCNC: 90 MMOL/L (ref 98–107)
CO2 SERPL-SCNC: 20 MMOL/L (ref 21–32)
COLOR UR: YELLOW
CREAT SERPL-MCNC: 0.73 MG/DL (ref 0.5–1.3)
CREAT UR-MCNC: 84.5 MG/DL (ref 20–370)
EGFRCR SERPLBLD CKD-EPI 2021: >90 ML/MIN/1.73M*2
EOSINOPHIL # BLD AUTO: 0.01 X10*3/UL (ref 0–0.7)
EOSINOPHIL NFR BLD AUTO: 0.2 %
ERYTHROCYTE [DISTWIDTH] IN BLOOD BY AUTOMATED COUNT: 14 % (ref 11.5–14.5)
GLUCOSE BLD MANUAL STRIP-MCNC: 119 MG/DL (ref 74–99)
GLUCOSE BLD MANUAL STRIP-MCNC: 133 MG/DL (ref 74–99)
GLUCOSE BLD MANUAL STRIP-MCNC: 147 MG/DL (ref 74–99)
GLUCOSE SERPL-MCNC: 150 MG/DL (ref 74–99)
GLUCOSE UR STRIP.AUTO-MCNC: NEGATIVE MG/DL
HCT VFR BLD AUTO: 32.4 % (ref 41–52)
HGB BLD-MCNC: 11.5 G/DL (ref 13.5–17.5)
HYALINE CASTS #/AREA URNS AUTO: ABNORMAL /LPF
IMM GRANULOCYTES # BLD AUTO: 0.03 X10*3/UL (ref 0–0.7)
IMM GRANULOCYTES NFR BLD AUTO: 0.5 % (ref 0–0.9)
INR PPP: 4 (ref 0.9–1.1)
KETONES UR STRIP.AUTO-MCNC: ABNORMAL MG/DL
LEUKOCYTE ESTERASE UR QL STRIP.AUTO: NEGATIVE
LYMPHOCYTES # BLD AUTO: 0.52 X10*3/UL (ref 1.2–4.8)
LYMPHOCYTES NFR BLD AUTO: 8.8 %
MCH RBC QN AUTO: 34.4 PG (ref 26–34)
MCHC RBC AUTO-ENTMCNC: 35.5 G/DL (ref 32–36)
MCV RBC AUTO: 97 FL (ref 80–100)
MONOCYTES # BLD AUTO: 0.54 X10*3/UL (ref 0.1–1)
MONOCYTES NFR BLD AUTO: 9.1 %
MUCOUS THREADS #/AREA URNS AUTO: ABNORMAL /LPF
NEUTROPHILS # BLD AUTO: 4.8 X10*3/UL (ref 1.2–7.7)
NEUTROPHILS NFR BLD AUTO: 80.9 %
NITRITE UR QL STRIP.AUTO: NEGATIVE
NRBC BLD-RTO: 0 /100 WBCS (ref 0–0)
PH UR STRIP.AUTO: 8 [PH]
PLATELET # BLD AUTO: 199 X10*3/UL (ref 150–450)
POTASSIUM SERPL-SCNC: 4.1 MMOL/L (ref 3.5–5.3)
PROT SERPL-MCNC: 6.6 G/DL (ref 6.4–8.2)
PROT UR STRIP.AUTO-MCNC: ABNORMAL MG/DL
PROTHROMBIN TIME: 46.1 SECONDS (ref 9.8–12.8)
RBC # BLD AUTO: 3.34 X10*6/UL (ref 4.5–5.9)
RBC # UR STRIP.AUTO: NEGATIVE /UL
RBC #/AREA URNS AUTO: ABNORMAL /HPF
SODIUM SERPL-SCNC: 123 MMOL/L (ref 136–145)
SODIUM SERPL-SCNC: 126 MMOL/L (ref 136–145)
SODIUM UR-SCNC: 33 MMOL/L
SODIUM/CREAT UR-RTO: 39 MMOL/G CREAT
SP GR UR STRIP.AUTO: 1.02
SQUAMOUS #/AREA URNS AUTO: ABNORMAL /HPF
UROBILINOGEN UR STRIP.AUTO-MCNC: ABNORMAL MG/DL
WBC # BLD AUTO: 5.9 X10*3/UL (ref 4.4–11.3)
WBC #/AREA URNS AUTO: ABNORMAL /HPF
YEAST BUDDING #/AREA UR COMP ASSIST: PRESENT /HPF

## 2024-09-17 PROCEDURE — 84295 ASSAY OF SERUM SODIUM: CPT | Performed by: INTERNAL MEDICINE

## 2024-09-17 PROCEDURE — 2500000001 HC RX 250 WO HCPCS SELF ADMINISTERED DRUGS (ALT 637 FOR MEDICARE OP): Performed by: NURSE PRACTITIONER

## 2024-09-17 PROCEDURE — 73502 X-RAY EXAM HIP UNI 2-3 VIEWS: CPT | Mod: LEFT SIDE | Performed by: RADIOLOGY

## 2024-09-17 PROCEDURE — 82533 TOTAL CORTISOL: CPT | Mod: PORLAB | Performed by: NURSE PRACTITIONER

## 2024-09-17 PROCEDURE — 96374 THER/PROPH/DIAG INJ IV PUSH: CPT

## 2024-09-17 PROCEDURE — 85610 PROTHROMBIN TIME: CPT | Performed by: EMERGENCY MEDICINE

## 2024-09-17 PROCEDURE — 2500000002 HC RX 250 W HCPCS SELF ADMINISTERED DRUGS (ALT 637 FOR MEDICARE OP, ALT 636 FOR OP/ED): Performed by: NURSE PRACTITIONER

## 2024-09-17 PROCEDURE — 36415 COLL VENOUS BLD VENIPUNCTURE: CPT | Performed by: EMERGENCY MEDICINE

## 2024-09-17 PROCEDURE — 82570 ASSAY OF URINE CREATININE: CPT | Performed by: NURSE PRACTITIONER

## 2024-09-17 PROCEDURE — 85025 COMPLETE CBC W/AUTO DIFF WBC: CPT | Performed by: EMERGENCY MEDICINE

## 2024-09-17 PROCEDURE — 83935 ASSAY OF URINE OSMOLALITY: CPT | Mod: PORLAB | Performed by: NURSE PRACTITIONER

## 2024-09-17 PROCEDURE — 36415 COLL VENOUS BLD VENIPUNCTURE: CPT | Performed by: INTERNAL MEDICINE

## 2024-09-17 PROCEDURE — 73502 X-RAY EXAM HIP UNI 2-3 VIEWS: CPT | Mod: LT

## 2024-09-17 PROCEDURE — 82947 ASSAY GLUCOSE BLOOD QUANT: CPT

## 2024-09-17 PROCEDURE — 99285 EMERGENCY DEPT VISIT HI MDM: CPT | Mod: 25

## 2024-09-17 PROCEDURE — 70450 CT HEAD/BRAIN W/O DYE: CPT | Performed by: RADIOLOGY

## 2024-09-17 PROCEDURE — 70450 CT HEAD/BRAIN W/O DYE: CPT

## 2024-09-17 PROCEDURE — 99223 1ST HOSP IP/OBS HIGH 75: CPT | Performed by: NURSE PRACTITIONER

## 2024-09-17 PROCEDURE — 80053 COMPREHEN METABOLIC PANEL: CPT | Performed by: EMERGENCY MEDICINE

## 2024-09-17 PROCEDURE — 2500000004 HC RX 250 GENERAL PHARMACY W/ HCPCS (ALT 636 FOR OP/ED): Performed by: NURSE PRACTITIONER

## 2024-09-17 PROCEDURE — 81001 URINALYSIS AUTO W/SCOPE: CPT | Performed by: NURSE PRACTITIONER

## 2024-09-17 PROCEDURE — G0378 HOSPITAL OBSERVATION PER HR: HCPCS

## 2024-09-17 PROCEDURE — 2500000004 HC RX 250 GENERAL PHARMACY W/ HCPCS (ALT 636 FOR OP/ED): Performed by: EMERGENCY MEDICINE

## 2024-09-17 PROCEDURE — 2500000001 HC RX 250 WO HCPCS SELF ADMINISTERED DRUGS (ALT 637 FOR MEDICARE OP): Performed by: EMERGENCY MEDICINE

## 2024-09-17 PROCEDURE — 83930 ASSAY OF BLOOD OSMOLALITY: CPT | Mod: PORLAB | Performed by: NURSE PRACTITIONER

## 2024-09-17 RX ORDER — METOPROLOL TARTRATE 50 MG/1
50 TABLET ORAL EVERY 12 HOURS
Status: DISCONTINUED | OUTPATIENT
Start: 2024-09-17 | End: 2024-09-20 | Stop reason: HOSPADM

## 2024-09-17 RX ORDER — FUROSEMIDE 20 MG/1
20 TABLET ORAL DAILY
Status: DISCONTINUED | OUTPATIENT
Start: 2024-09-17 | End: 2024-09-20 | Stop reason: HOSPADM

## 2024-09-17 RX ORDER — SODIUM CHLORIDE 9 MG/ML
75 INJECTION, SOLUTION INTRAVENOUS CONTINUOUS
Status: DISCONTINUED | OUTPATIENT
Start: 2024-09-17 | End: 2024-09-19

## 2024-09-17 RX ORDER — DEXTROSE 50 % IN WATER (D50W) INTRAVENOUS SYRINGE
12.5
Status: DISCONTINUED | OUTPATIENT
Start: 2024-09-17 | End: 2024-09-20 | Stop reason: HOSPADM

## 2024-09-17 RX ORDER — NIFEDIPINE 30 MG/1
30 TABLET, FILM COATED, EXTENDED RELEASE ORAL DAILY
Status: DISCONTINUED | OUTPATIENT
Start: 2024-09-17 | End: 2024-09-20 | Stop reason: HOSPADM

## 2024-09-17 RX ORDER — POLYETHYLENE GLYCOL 3350 17 G/17G
17 POWDER, FOR SOLUTION ORAL DAILY
Status: DISCONTINUED | OUTPATIENT
Start: 2024-09-17 | End: 2024-09-20 | Stop reason: HOSPADM

## 2024-09-17 RX ORDER — LANOLIN ALCOHOL/MO/W.PET/CERES
400 CREAM (GRAM) TOPICAL DAILY
Status: DISCONTINUED | OUTPATIENT
Start: 2024-09-17 | End: 2024-09-20 | Stop reason: HOSPADM

## 2024-09-17 RX ORDER — TAMSULOSIN HYDROCHLORIDE 0.4 MG/1
0.8 CAPSULE ORAL DAILY
Status: DISCONTINUED | OUTPATIENT
Start: 2024-09-17 | End: 2024-09-20 | Stop reason: HOSPADM

## 2024-09-17 RX ORDER — PANTOPRAZOLE SODIUM 40 MG/1
40 TABLET, DELAYED RELEASE ORAL
Status: DISCONTINUED | OUTPATIENT
Start: 2024-09-18 | End: 2024-09-17

## 2024-09-17 RX ORDER — INSULIN LISPRO 100 [IU]/ML
0-10 INJECTION, SOLUTION INTRAVENOUS; SUBCUTANEOUS
Status: DISCONTINUED | OUTPATIENT
Start: 2024-09-17 | End: 2024-09-20 | Stop reason: HOSPADM

## 2024-09-17 RX ORDER — ONDANSETRON 4 MG/1
4 TABLET, FILM COATED ORAL EVERY 8 HOURS PRN
Status: DISCONTINUED | OUTPATIENT
Start: 2024-09-17 | End: 2024-09-20 | Stop reason: HOSPADM

## 2024-09-17 RX ORDER — BISACODYL 5 MG
10 TABLET, DELAYED RELEASE (ENTERIC COATED) ORAL DAILY PRN
Status: DISCONTINUED | OUTPATIENT
Start: 2024-09-17 | End: 2024-09-20 | Stop reason: HOSPADM

## 2024-09-17 RX ORDER — PANTOPRAZOLE SODIUM 40 MG/10ML
40 INJECTION, POWDER, LYOPHILIZED, FOR SOLUTION INTRAVENOUS
Status: DISCONTINUED | OUTPATIENT
Start: 2024-09-18 | End: 2024-09-17

## 2024-09-17 RX ORDER — DEXTROSE 50 % IN WATER (D50W) INTRAVENOUS SYRINGE
25
Status: DISCONTINUED | OUTPATIENT
Start: 2024-09-17 | End: 2024-09-20 | Stop reason: HOSPADM

## 2024-09-17 RX ORDER — SULFASALAZINE 500 MG/1
1000 TABLET ORAL 3 TIMES DAILY
Status: DISCONTINUED | OUTPATIENT
Start: 2024-09-17 | End: 2024-09-20 | Stop reason: HOSPADM

## 2024-09-17 RX ORDER — FOLIC ACID 1 MG/1
1 TABLET ORAL DAILY
Status: DISCONTINUED | OUTPATIENT
Start: 2024-09-17 | End: 2024-09-20 | Stop reason: HOSPADM

## 2024-09-17 RX ORDER — ONDANSETRON HYDROCHLORIDE 2 MG/ML
4 INJECTION, SOLUTION INTRAVENOUS EVERY 8 HOURS PRN
Status: DISCONTINUED | OUTPATIENT
Start: 2024-09-17 | End: 2024-09-20 | Stop reason: HOSPADM

## 2024-09-17 RX ORDER — GUAIFENESIN 600 MG/1
600 TABLET, EXTENDED RELEASE ORAL EVERY 12 HOURS PRN
Status: DISCONTINUED | OUTPATIENT
Start: 2024-09-17 | End: 2024-09-20 | Stop reason: HOSPADM

## 2024-09-17 RX ORDER — TALC
6 POWDER (GRAM) TOPICAL NIGHTLY PRN
Status: DISCONTINUED | OUTPATIENT
Start: 2024-09-17 | End: 2024-09-20 | Stop reason: HOSPADM

## 2024-09-17 RX ORDER — MORPHINE SULFATE 2 MG/ML
2 INJECTION, SOLUTION INTRAMUSCULAR; INTRAVENOUS EVERY 4 HOURS PRN
Status: DISCONTINUED | OUTPATIENT
Start: 2024-09-17 | End: 2024-09-20 | Stop reason: HOSPADM

## 2024-09-17 RX ORDER — BISACODYL 10 MG/1
10 SUPPOSITORY RECTAL DAILY PRN
Status: DISCONTINUED | OUTPATIENT
Start: 2024-09-17 | End: 2024-09-20 | Stop reason: HOSPADM

## 2024-09-17 RX ORDER — MORPHINE SULFATE 4 MG/ML
4 INJECTION INTRAVENOUS EVERY 4 HOURS PRN
Status: DISCONTINUED | OUTPATIENT
Start: 2024-09-17 | End: 2024-09-20 | Stop reason: HOSPADM

## 2024-09-17 RX ORDER — OXYCODONE AND ACETAMINOPHEN 5; 325 MG/1; MG/1
1 TABLET ORAL ONCE
Status: COMPLETED | OUTPATIENT
Start: 2024-09-17 | End: 2024-09-17

## 2024-09-17 SDOH — SOCIAL STABILITY: SOCIAL INSECURITY: WERE YOU ABLE TO COMPLETE ALL THE BEHAVIORAL HEALTH SCREENINGS?: YES

## 2024-09-17 SDOH — SOCIAL STABILITY: SOCIAL INSECURITY: HAVE YOU HAD THOUGHTS OF HARMING ANYONE ELSE?: NO

## 2024-09-17 ASSESSMENT — ACTIVITIES OF DAILY LIVING (ADL)
PATIENT'S MEMORY ADEQUATE TO SAFELY COMPLETE DAILY ACTIVITIES?: YES
HEARING - LEFT EAR: FUNCTIONAL
JUDGMENT_ADEQUATE_SAFELY_COMPLETE_DAILY_ACTIVITIES: YES
DRESSING YOURSELF: NEEDS ASSISTANCE
GROOMING: INDEPENDENT
LACK_OF_TRANSPORTATION: NO
BATHING: NEEDS ASSISTANCE
WALKS IN HOME: NEEDS ASSISTANCE
FEEDING YOURSELF: INDEPENDENT
ADEQUATE_TO_COMPLETE_ADL: YES
TOILETING: INDEPENDENT
HEARING - RIGHT EAR: FUNCTIONAL

## 2024-09-17 ASSESSMENT — COGNITIVE AND FUNCTIONAL STATUS - GENERAL
HELP NEEDED FOR BATHING: A LITTLE
DAILY ACTIVITIY SCORE: 22
STANDING UP FROM CHAIR USING ARMS: A LITTLE
MOVING TO AND FROM BED TO CHAIR: A LITTLE
TURNING FROM BACK TO SIDE WHILE IN FLAT BAD: A LITTLE
DAILY ACTIVITIY SCORE: 22
MOBILITY SCORE: 17
TURNING FROM BACK TO SIDE WHILE IN FLAT BAD: A LITTLE
CLIMB 3 TO 5 STEPS WITH RAILING: A LOT
MOBILITY SCORE: 19
PATIENT BASELINE BEDBOUND: NO
HELP NEEDED FOR BATHING: A LITTLE
STANDING UP FROM CHAIR USING ARMS: A LITTLE
WALKING IN HOSPITAL ROOM: A LOT
DRESSING REGULAR LOWER BODY CLOTHING: A LITTLE
DRESSING REGULAR LOWER BODY CLOTHING: A LITTLE
CLIMB 3 TO 5 STEPS WITH RAILING: A LITTLE
WALKING IN HOSPITAL ROOM: A LITTLE
MOVING TO AND FROM BED TO CHAIR: A LITTLE

## 2024-09-17 ASSESSMENT — ENCOUNTER SYMPTOMS
LIGHT-HEADEDNESS: 0
HALLUCINATIONS: 0
DYSPHORIC MOOD: 0
WHEEZING: 0
TREMORS: 0
SLEEP DISTURBANCE: 0
EYE PAIN: 0
ADENOPATHY: 0
CHILLS: 0
VOICE CHANGE: 0
SINUS PAIN: 0
POLYPHAGIA: 0
APNEA: 0
APPETITE CHANGE: 0
NECK STIFFNESS: 0
POLYDIPSIA: 0
CONSTIPATION: 0
ABDOMINAL PAIN: 0
FATIGUE: 0
CHOKING: 0
HEMATURIA: 0
ABDOMINAL DISTENTION: 0
CHEST TIGHTNESS: 0
DIZZINESS: 0
TROUBLE SWALLOWING: 0
UNEXPECTED WEIGHT CHANGE: 0
CONFUSION: 0
DIFFICULTY URINATING: 0
ARTHRALGIAS: 1
HEADACHES: 0
FEVER: 0
SPEECH DIFFICULTY: 0
WOUND: 0
FLANK PAIN: 0
VOMITING: 0
NAUSEA: 0
WEAKNESS: 0
SORE THROAT: 0
NUMBNESS: 0
FREQUENCY: 0
BACK PAIN: 0
COLOR CHANGE: 0
PALPITATIONS: 0
EYE ITCHING: 0
BLOOD IN STOOL: 0
NERVOUS/ANXIOUS: 0
COUGH: 0
MYALGIAS: 0
DIARRHEA: 0
SHORTNESS OF BREATH: 0
NECK PAIN: 0
EYE DISCHARGE: 0
PHOTOPHOBIA: 0
DYSURIA: 0
BRUISES/BLEEDS EASILY: 0
SEIZURES: 0

## 2024-09-17 ASSESSMENT — PAIN DESCRIPTION - ORIENTATION: ORIENTATION: LEFT

## 2024-09-17 ASSESSMENT — LIFESTYLE VARIABLES
AUDIT-C TOTAL SCORE: 0
AUDIT-C TOTAL SCORE: 0
SKIP TO QUESTIONS 9-10: 1
HOW OFTEN DO YOU HAVE 6 OR MORE DRINKS ON ONE OCCASION: NEVER
SUBSTANCE_ABUSE_PAST_12_MONTHS: NO
HOW MANY STANDARD DRINKS CONTAINING ALCOHOL DO YOU HAVE ON A TYPICAL DAY: PATIENT DOES NOT DRINK
PRESCIPTION_ABUSE_PAST_12_MONTHS: NO
HOW OFTEN DO YOU HAVE A DRINK CONTAINING ALCOHOL: NEVER

## 2024-09-17 ASSESSMENT — PAIN SCALES - GENERAL
PAINLEVEL_OUTOF10: 5 - MODERATE PAIN
PAINLEVEL_OUTOF10: 0 - NO PAIN
PAINLEVEL_OUTOF10: 0 - NO PAIN
PAINLEVEL_OUTOF10: 2
PAINLEVEL_OUTOF10: 0 - NO PAIN
PAINLEVEL_OUTOF10: 10 - WORST POSSIBLE PAIN
PAINLEVEL_OUTOF10: 10 - WORST POSSIBLE PAIN

## 2024-09-17 ASSESSMENT — COLUMBIA-SUICIDE SEVERITY RATING SCALE - C-SSRS
1. IN THE PAST MONTH, HAVE YOU WISHED YOU WERE DEAD OR WISHED YOU COULD GO TO SLEEP AND NOT WAKE UP?: NO
2. HAVE YOU ACTUALLY HAD ANY THOUGHTS OF KILLING YOURSELF?: NO
6. HAVE YOU EVER DONE ANYTHING, STARTED TO DO ANYTHING, OR PREPARED TO DO ANYTHING TO END YOUR LIFE?: NO

## 2024-09-17 ASSESSMENT — PAIN - FUNCTIONAL ASSESSMENT
PAIN_FUNCTIONAL_ASSESSMENT: 0-10

## 2024-09-17 ASSESSMENT — PAIN DESCRIPTION - PAIN TYPE
TYPE: ACUTE PAIN
TYPE: ACUTE PAIN

## 2024-09-17 ASSESSMENT — PATIENT HEALTH QUESTIONNAIRE - PHQ9
2. FEELING DOWN, DEPRESSED OR HOPELESS: NOT AT ALL
1. LITTLE INTEREST OR PLEASURE IN DOING THINGS: NOT AT ALL
SUM OF ALL RESPONSES TO PHQ9 QUESTIONS 1 & 2: 0

## 2024-09-17 ASSESSMENT — PAIN DESCRIPTION - PROGRESSION: CLINICAL_PROGRESSION: RAPIDLY IMPROVING

## 2024-09-17 ASSESSMENT — PAIN DESCRIPTION - LOCATION
LOCATION: HIP
LOCATION: HIP

## 2024-09-17 ASSESSMENT — PAIN DESCRIPTION - DESCRIPTORS: DESCRIPTORS: SHARP

## 2024-09-17 NOTE — PROGRESS NOTES
"Pharmacy Consult for Warfarin (Coumadin) Management - Initial Consult Note     Kiko Petersen is a 64 y.o. male admitted for Hyponatremia. Pharmacy was consulted for warfarin dosing and monitoring.       Day-1; INR 4; will hold dose today    Labs  POC INR   Date Value Ref Range Status   09/11/2024 3.00 2.00 - 3.00 Final   09/04/2024 3.20 (A) 2.00 - 3.00 Final   08/28/2024 1.70 (A) 2.00 - 3.00 Corrected     INR   Date Value Ref Range Status   09/17/2024 4.0 (H) 0.9 - 1.1 Final     POC Prothrombin Time   Date Value Ref Range Status   09/11/2024 35.60 Normal Range: 9.3 - 12.5 Final   09/04/2024 38.10 Normal Range: 9.3 - 12.5 Final   08/28/2024 20.40 Normal Range: 9.3 - 12.5 Corrected     Protime   Date Value Ref Range Status   09/17/2024 46.1 (H) 9.8 - 12.8 seconds Final     Hemoglobin   Date Value Ref Range Status   09/17/2024 11.5 (L) 13.5 - 17.5 g/dL Final     Hematocrit   Date Value Ref Range Status   09/17/2024 32.4 (L) 41.0 - 52.0 % Final     Platelets   Date Value Ref Range Status   09/17/2024 199 150 - 450 x10*3/uL Final     No results found for: \"PTT\"  [unfilled]  Warfarin Indication: Atrial fibrillation or flutter  Target INR: 2 - 2.5  [unfilled]  [unfilled]  Assessment       Plan     INR 4; hold dose stefania Madrigal, PharmD  "

## 2024-09-17 NOTE — CONSULTS
Nephrology Consult Note                                                                                                                                         Inpatient consult to Nephrology  Consult performed by: Kim Hilliard DO  Consult ordered by: Kamari Taylor, KAYLEEN-RAJANI                                                                                                               HPI  Patient is a 64 y.o. male history of hypertension and hemochromatosis, cirrhosis who is admitted to hospital with complaints of   left hip pain. Nephrology consulted in view of hyponatremia.   Patient does have a history of hyponatremia.  He was hospitalized at [facility] last July with sodium as low as 117.  At that time he was on was syncopal.  He was on furosemide and spironolactone.  Recently his diuretics have been stopped.  He is only to be taking Lasix as needed.  He has not required Lasix lately.  He has been having severe left hip pain this is in a hip that has been replaced in the past.  He also has had some nausea that he says he always gets when his allergies are acting up.  His p.o. intake has been poor.  He presents with a sodium of 123.  Patient has had not had any lower extremity swelling.  He did have volume overloaded state  In the past but it improved with diuretics.  He has not required paracentesis.  Patient denies any recent EtOH use    Past Medical History:   Diagnosis Date    Diabetes mellitus (Multi)     Encounter for screening for malignant neoplasm of colon 07/30/2019    Encounter for screening colonoscopy    Hypertension     Unspecified cataract     Cataract, right eye    Urinary tract infection, site not specified 03/02/2020    UTI (urinary tract infection), bacterial      Social History     Socioeconomic History    Marital status:      Spouse name:  Not on file    Number of children: Not on file    Years of education: Not on file    Highest education level: Not on file   Occupational History    Not on file   Tobacco Use    Smoking status: Former     Current packs/day: 0.00     Average packs/day: 0.1 packs/day for 1 year (0.1 ttl pk-yrs)     Types: Cigarettes     Start date:      Quit date: 1980     Years since quittin.7    Smokeless tobacco: Never   Vaping Use    Vaping status: Never Used   Substance and Sexual Activity    Alcohol use: Yes     Comment: may be 3 drinks a year    Drug use: Never    Sexual activity: Not on file   Other Topics Concern    Not on file   Social History Narrative    Not on file     Social Determinants of Health     Financial Resource Strain: Low Risk  (2024)    Overall Financial Resource Strain (CARDIA)     Difficulty of Paying Living Expenses: Not hard at all   Food Insecurity: Not on file   Transportation Needs: No Transportation Needs (2024)    PRAPARE - Transportation     Lack of Transportation (Medical): No     Lack of Transportation (Non-Medical): No   Physical Activity: Not on file   Stress: Not on file   Social Connections: Not on file   Intimate Partner Violence: Not on file   Housing Stability: Low Risk  (2024)    Housing Stability Vital Sign     Unable to Pay for Housing in the Last Year: No     Number of Times Moved in the Last Year: 0     Homeless in the Last Year: No      Family History   Problem Relation Name Age of Onset    Hypertension Mother      Emphysema Mother      Lung cancer Father      Hypertension Father      Hypertension Brother      Heart attack Brother      Breast cancer Mother's Sister      Hypertension Mother's Sister      Emphysema Mother's Sister      Colon cancer Father's Sister      Hypertension Father's Sister      Hypertension Father's Brother      Hypertension Maternal Grandmother      Hypertension Maternal Grandfather      Heart attack Maternal Grandfather        No current  facility-administered medications on file prior to encounter.     Current Outpatient Medications on File Prior to Encounter   Medication Sig Dispense Refill    folic acid (Folvite) 1 mg tablet TAKE 1 TABLET BY MOUTH ONCE DAILY 90 tablet 0    ketoconazole (NIZOral) 2 % cream Apply topically.      Lactobacillus acidophilus (Acidophilus) capsule Take 1 capsule by mouth once daily.      Lasix 40 mg tablet Take 0.5 tablets (20 mg) by mouth once daily. 90 tablet 0    lisinopril 20 mg tablet Take 1 tablet (20 mg) by mouth once daily. 90 tablet 0    magnesium oxide (Mag-Ox) 400 mg (241.3 mg magnesium) tablet Take 1 tablet (400 mg) by mouth once daily.      metFORMIN (Glucophage) 500 mg tablet TAKE 1 TABLET BY MOUTH TWO TIMES A DAY WITH MEALS 180 tablet 0    metoprolol tartrate (Lopressor) 50 mg tablet TAKE 1 TABLET BY MOUTH EVERY 12 HOURS 180 tablet 3    NIFEdipine ER (NIFEdipine CC) 30 mg 24 hr tablet Take 1 tablet (30 mg) by mouth once daily. 90 tablet 0    sulfaSALAzine (Azulfidine) 500 mg tablet TAKE 2 TABLETS BY MOUTH THREE TIMES A  tablet 3    tadalafil (Cialis) 5 mg tablet TAKE 1 TABLET BY MOUTH EVERY DAY 90 tablet 0    tamsulosin (Flomax) 0.4 mg 24 hr capsule Take 2 capsules (0.8 mg) by mouth once daily. 180 capsule 3    warfarin (Coumadin) 2.5 mg tablet Take 1.25mg (1/2 tablet ) by mouth Tuesdays and Thursdays and 2.5mg (1 tablet) by mouth all other days or as directed by Coumadin clinic 24 tablet 3    [DISCONTINUED] lisinopril 20 mg tablet Take 1 tablet (20 mg) by mouth once daily. 90 tablet 0    [DISCONTINUED] metFORMIN (Glucophage) 500 mg tablet TAKE 1 TABLET BY MOUTH TWO TIMES A DAY WITH MEALS 180 tablet 0    [DISCONTINUED] NIFEdipine ER (NIFEdipine CC) 30 mg 24 hr tablet Take 1 tablet (30 mg) by mouth once daily. 90 tablet 0      Scheduled medications  influenza, 0.5 mL, intramuscular, During hospitalization  folic acid, 1 mg, oral, Daily  furosemide, 20 mg, oral, Daily  insulin lispro, 0-10 Units,  "subcutaneous, TID  lactobacillus acidophilus, 1 capsule, oral, Daily  magnesium oxide, 400 mg, oral, Daily  metoprolol tartrate, 50 mg, oral, q12h  NIFEdipine ER, 30 mg, oral, Daily  polyethylene glycol, 17 g, oral, Daily  sulfaSALAzine, 1,000 mg, oral, TID  tamsulosin, 0.8 mg, oral, Daily      Continuous medications  sodium chloride 0.9%, 75 mL/hr, Last Rate: 75 mL/hr (09/17/24 1302)      PRN medications  PRN medications: bisacodyl, bisacodyl, dextrose, dextrose, glucagon, glucagon, guaiFENesin, melatonin, morphine, morphine, ondansetron **OR** ondansetron     Review of systems  as per HPI otherwise 10 point review systems negative    BP (!) 167/91   Pulse 76   Temp 36.2 °C (97.2 °F) (Temporal)   Resp 18   Ht 1.778 m (5' 10\")   Wt 83 kg (183 lb)   SpO2 100%   BMI 26.26 kg/m²     Input / Output:  24 HR: No intake or output data in the 24 hours ending 09/17/24 1546    Physical Exam   Alert and oriented x 3, NAD  EOMI  OP clear  Neck: supple, No JVD  CV: RRR without m/r/g  Lungs: CTA bilaterally  Abd: soft NT/ND +BS  Ext: no lower extremity edema   Neuro: grossly intact  Skin: no rashes    Results from last 7 days   Lab Units 09/17/24  0802   SODIUM mmol/L 123*   POTASSIUM mmol/L 4.1   CHLORIDE mmol/L 90*   CO2 mmol/L 20*   BUN mg/dL 9   CREATININE mg/dL 0.73   GLUCOSE mg/dL 150*   CALCIUM mg/dL 7.9*        Results from last 7 days   Lab Units 09/17/24  0802   SODIUM mmol/L 123*   POTASSIUM mmol/L 4.1   CHLORIDE mmol/L 90*   CO2 mmol/L 20*   BUN mg/dL 9   CREATININE mg/dL 0.73   CALCIUM mg/dL 7.9*   PROTEIN TOTAL g/dL 6.6   BILIRUBIN TOTAL mg/dL 1.7*   ALK PHOS U/L 98   ALT U/L 37   AST U/L 66*   GLUCOSE mg/dL 150*           Results from last 7 days   Lab Units 09/17/24  0802   WBC AUTO x10*3/uL 5.9   HEMOGLOBIN g/dL 11.5*   HEMATOCRIT % 32.4*   PLATELETS AUTO x10*3/uL 199        CT head wo IV contrast   Final Result   Age-related changes similar to prior. No acute intracranial process.                  MACRO: "   None.        Signed by: Marco Phelps 9/17/2024 8:39 AM   Dictation workstation:   [id number]      XR hip left with pelvis when performed 2 or 3 views   Final Result   No acute displaced osseous pelvic or left hip fracture. Status post   left GERALD with intact hardware.        MACRO:   None.        Signed by: Johanna Mattson 9/17/2024 8:27 AM   Dictation workstation:   [id number]           Assessment:   Patient is 64 y.o. male who is admitted to hospital for left hip pain and hyponatremia. Nephrology consulted in view of hyponatremia.    Hyponatremia  -Acute on chronic issue in the setting of cirrhosis   -patient has not been on diuretics  -He is actually had poor p.o. intake due to nausea   -sodium level in 1 week ago was 125  -Sodium at presentation now is 123  -Could be hypovolemic but may also have ADH increased from acute pain and nausea.      Cirrhosis-hemochromatosis, history of EtOH dependence  Hypertension-accelerated due to pain        Recommendations:   -Agree with trial of normal saline, currently running at 75 mL/h  -Repeat serum sodium now, if sodium drops obviously will need to stop the normal saline and add fluid restriction and possibly loop diuretic  -I ordered a sodium for now and instructions for nurse to message me with the result.      Please message me through [employer] chat with any questions or concerns.     Kim Hilliard DO  9/17/2024  3:46 PM         [facility]    [address] [place] [address], [address]   [place] [address]  Office: [phone]

## 2024-09-17 NOTE — ED TRIAGE NOTES
Pt coming to ED with L hip pain. 2/10 pain when laying down; 10/10 pain when ambulating. Pt woke up on Sunday night with this pain. No injury or trauma, no shortening, no swelling, no deformity. Pt had hip replacement last year. Pt has hx of Afib and DM and did not take morning meds.

## 2024-09-17 NOTE — ED PROVIDER NOTES
HPI   Chief Complaint   Patient presents with   • Hip Pain       Patient presents with left hip pain and left leg weakness.  He states this has been ongoing for 3 days.  He states he has had pain in the left hip.  And over the course of the past couple of days his left leg has become more weak.  He states he had to drag it behind him because he could not ambulate properly at home.  Is been using a cane.  He has a history of left total hip arthroplasty done about 18 months ago.  He has had no issues with it.  Denies any new falls or trauma.  He took nothing for the pain at home.  He denies any radiation of pain down the left leg.                          [place] Coma Scale Score: 15         NIH Stroke Scale: 2          Patient History   Past Medical History:   Diagnosis Date   • Diabetes mellitus (Multi)    • Encounter for screening for malignant neoplasm of colon 07/30/2019    Encounter for screening colonoscopy   • Hypertension    • Unspecified cataract     Cataract, right eye   • Urinary tract infection, site not specified 03/02/2020    UTI (urinary tract infection), bacterial     Past Surgical History:   Procedure Laterality Date   • CARDIAC ELECTROPHYSIOLOGY PROCEDURE N/A 3/12/2024    Procedure: Ablation A-Fib;  Surgeon: Jaylen Guerrero MD;  Location: [facility] Cardiac Cath Lab;  Service: Electrophysiology;  Laterality: N/A;  atrial fibrillation ablation, CARTO, general anesthesia, hold eliquis morning of procedure   • EXCISION / REPAIR HYDROCELE PEDIATRIC     • HERNIA REPAIR  10/14/2015    Inguinal Hernia Repair   • REPLACEMENT TOTAL HIP LATERAL POSITION Left      Family History   Problem Relation Name Age of Onset   • Hypertension Mother     • Emphysema Mother     • Lung cancer Father     • Hypertension Father     • Hypertension Brother     • Heart attack Brother     • Breast cancer Mother's Sister     • Hypertension Mother's Sister     • Emphysema Mother's Sister     • Colon cancer Father's Sister      • Hypertension Father's Sister     • Hypertension Father's Brother     • Hypertension Maternal Grandmother     • Hypertension Maternal Grandfather     • Heart attack Maternal Grandfather       Social History     Tobacco Use   • Smoking status: Former     Current packs/day: 0.00     Average packs/day: 0.1 packs/day for 1 year (0.1 ttl pk-yrs)     Types: Cigarettes     Start date:      Quit date: 1980     Years since quittin.7   • Smokeless tobacco: Never   Vaping Use   • Vaping status: Never Used   Substance Use Topics   • Alcohol use: Yes     Comment: may be 3 drinks a year   • Drug use: Never       Physical Exam   ED Triage Vitals [24 0746]   Temperature Heart Rate Respirations BP   36.2 °C (97.2 °F) (!) 112 18 142/90      Pulse Ox Temp Source Heart Rate Source Patient Position   100 % Temporal Monitor --      BP Location FiO2 (%)     -- --       Physical Exam  Vitals and nursing note reviewed.   Constitutional:       Appearance: Normal appearance.   HENT:      Head: Normocephalic and atraumatic.      Mouth/Throat:      Mouth: Mucous membranes are moist.   Eyes:      Extraocular Movements: Extraocular movements intact.      Pupils: Pupils are equal, round, and reactive to light.   Cardiovascular:      Rate and Rhythm: Normal rate and regular rhythm.      Heart sounds: No murmur heard.  Pulmonary:      Effort: Pulmonary effort is normal. No respiratory distress.      Breath sounds: Normal breath sounds.   Abdominal:      General: There is no distension.      Palpations: Abdomen is soft.      Tenderness: There is no abdominal tenderness.   Musculoskeletal:         General: No tenderness or deformity. Normal range of motion.      Right lower leg: No edema.      Left lower leg: No edema.      Comments: There is no specific hip tenderness.   Skin:     General: Skin is warm and dry.      Findings: No lesion or rash.   Neurological:      General: No focal deficit present.      Mental Status: He is alert  and oriented to person, place, and time.      Sensory: No sensory deficit.      Motor: Weakness (Left leg) present.      Comments: NIH is 2 because of left leg weakness   Psychiatric:         Behavior: Behavior normal.       Labs Reviewed   CBC WITH AUTO DIFFERENTIAL   COMPREHENSIVE METABOLIC PANEL   PROTIME-INR     XR hip left with pelvis when performed 2 or 3 views    (Results Pending)   CT head wo IV contrast    (Results Pending)     ED Course & MDM   Diagnoses as of 09/17/24 1056   Left hip pain   Hyponatremia       Medical Decision Making  Differentials include hip fracture versus dislocation, stroke, peripheral neuropathy. Imaging studies, if performed, were independently reviewed and interpreted by myself and confirmed by radiologist. EKG(s), if performed, were interpreted by myself.Patient was given Percocet for pain control.  X-ray of the left hip shows nothing acute except for his arthroplasty which is intact.  CT scan of the head was obtained and shows nothing remarkable.  Hemoglobin is 11.5 which is her baseline.  INR is 4.0.  Sodium is 123.  He has had low sodium levels in the past but this is the lowest it has ever been.  I attempted to ambulate the patient 2 times and he was unable to ambulate appropriately.  I feel he does require admission for PT evaluation and assessment of his low sodium.  Spoke with the hospitalist NP for admission.        Procedure  Procedures     Ankur Felix MD  09/17/24 1051

## 2024-09-17 NOTE — H&P
History Obtained From: Patient    History Of Present Illness:  Kiko Petersen is a 64 y.o. male with PMHx s/f prostate cancer, portal hypertension, atrial fibrillation status post ablation, anticoagulation therapy with warfarin, ulcerative colitis, hypertension, obstructive sleep apnea, history of left total hip arthroplasty in March 2023 at [facility] presenting with plaint of severe left hip pain.  The patient's symptoms started last Sunday while on way to watch a football game the patient had been driving in his car and developed left hip pain.  Patient had increased pain with walking patient had some easing of his pain on Monday seem to improve and even go away for a period of time then patient woke up suddenly this morning with a sharp pain and had difficulty walking to the bathroom.  Patient had tried taking some oxycodone at home but that was not really helpful with this pain so he come into the emergency department for evaluation.  Patient denies any recent fall or trauma he does not have any fevers or chills he does not have any ongoing issues with severe diarrhea no nausea or vomiting.  Vital signs show temperature 97.2 heart rate 112 respiratory rate 18 blood pressure 142/90 SpO2 100% on room air.  Chemistry panel shows a glucose 150 sodium 123 chloride 98 bicarb 20 AST 66 total bilirubin 1.7 calcium 7.9 remainder of chemistry panel within normal limits.  CBC shows no leukocytosis there is anemia with hemoglobin 11.5 hematocrit 32.4 platelets 199 INR was measured at 4.  An x-ray of the left hip shows the patient to be status post left total hip arthroplasty with all hardware intact no acute displaced osseous or pelvic or left hip fractures.  A CT scan of the head was done by the ER provider due to some weakness in the left leg there were only age-related changes no acute intracranial process noted.  Patient was given Percocet was unable to get up and ambulate the ED provider wanted to admit the  patient for further evaluation and management of his hyponatremia as well as his inability to walk due to severe left hip pain.        ED Course:  Diagnoses as of 09/17/24 1147   Left hip pain   Hyponatremia     Relevant Results  Results for orders placed or performed during the hospital encounter of 09/17/24 (from the past 24 hour(s))   CBC and Auto Differential   Result Value Ref Range    WBC 5.9 4.4 - 11.3 x10*3/uL    nRBC 0.0 0.0 - 0.0 /100 WBCs    RBC 3.34 (L) 4.50 - 5.90 x10*6/uL    Hemoglobin 11.5 (L) 13.5 - 17.5 g/dL    Hematocrit 32.4 (L) 41.0 - 52.0 %    MCV 97 80 - 100 fL    MCH 34.4 (H) 26.0 - 34.0 pg    MCHC 35.5 32.0 - 36.0 g/dL    RDW 14.0 11.5 - 14.5 %    Platelets 199 150 - 450 x10*3/uL    Neutrophils % 80.9 40.0 - 80.0 %    Immature Granulocytes %, Automated 0.5 0.0 - 0.9 %    Lymphocytes % 8.8 13.0 - 44.0 %    Monocytes % 9.1 2.0 - 10.0 %    Eosinophils % 0.2 0.0 - 6.0 %    Basophils % 0.5 0.0 - 2.0 %    Neutrophils Absolute 4.80 1.20 - 7.70 x10*3/uL    Immature Granulocytes Absolute, Automated 0.03 0.00 - 0.70 x10*3/uL    Lymphocytes Absolute 0.52 (L) 1.20 - 4.80 x10*3/uL    Monocytes Absolute 0.54 0.10 - 1.00 x10*3/uL    Eosinophils Absolute 0.01 0.00 - 0.70 x10*3/uL    Basophils Absolute 0.03 0.00 - 0.10 x10*3/uL   Comprehensive metabolic panel   Result Value Ref Range    Glucose 150 (H) 74 - 99 mg/dL    Sodium 123 (L) 136 - 145 mmol/L    Potassium 4.1 3.5 - 5.3 mmol/L    Chloride 90 (L) 98 - 107 mmol/L    Bicarbonate 20 (L) 21 - 32 mmol/L    Anion Gap 17 10 - 20 mmol/L    Urea Nitrogen 9 6 - 23 mg/dL    Creatinine 0.73 0.50 - 1.30 mg/dL    eGFR >90 >60 mL/min/1.73m*2    Calcium 7.9 (L) 8.6 - 10.3 mg/dL    Albumin 3.6 3.4 - 5.0 g/dL    Alkaline Phosphatase 98 33 - 136 U/L    Total Protein 6.6 6.4 - 8.2 g/dL    AST 66 (H) 9 - 39 U/L    Bilirubin, Total 1.7 (H) 0.0 - 1.2 mg/dL    ALT 37 10 - 52 U/L   Protime-INR   Result Value Ref Range    Protime 46.1 (H) 9.8 - 12.8 seconds    INR 4.0 (H) 0.9 -  1.1      CT head wo IV contrast    Result Date: 9/17/2024  Interpreted By:  Marco Phelps, STUDY: CT HEAD WO IV CONTRAST;  9/17/2024 8:29 am   INDICATION: Signs/Symptoms:left leg weakness.     COMPARISON: 07/23/2023.   ACCESSION NUMBER(S): [id number]   ORDERING CLINICIAN: ARLET EDWARD   TECHNIQUE: Contiguous unenhanced axial images were obtained through the brain.   FINDINGS: INTRACRANIAL: Mild generalized atrophy is again seen. Mild nonspecific irregular low-attenuation changes throughout the periventricular and subcortical white matter are similar to prior, most likely related to chronic microvascular disease.  No acute intracranial bleed, midline shift, or mass effect is seen. Gray-white differentiation is maintained. No extra-axial fluid collection or hydrocephalus. Irregular atherosclerotic calcifications present in the internal carotid artery and vertebral artery segments.   Bones are intact.   EXTRACRANIAL: Visualized paranasal sinuses and mastoids are clear.       Age-related changes similar to prior. No acute intracranial process.       MACRO: None.   Signed by: Marco Phelps 9/17/2024 8:39 AM Dictation workstation:   [id number]    XR hip left with pelvis when performed 2 or 3 views    Result Date: 9/17/2024  Interpreted By:  Johanna Mattson, STUDY: XR HIP LEFT WITH PELVIS WHEN PERFORMED 2 OR 3 VIEWS;  9/17/2024 8:21 am   INDICATION: Signs/Symptoms:pain.     COMPARISON: 05/11/2023   ACCESSION NUMBER(S): [id number]   ORDERING CLINICIAN: ARLET EDWARD   FINDINGS: Frontal view of the pelvis and two views of the left hip obtained.   No acute displaced osseous pelvic fracture. Left hip arthroplasty with 2 screws fixing the acetabular component in grossly stable anatomic alignment. No abnormal lucency or acute fracture around the hardware. Degenerative changes of the right hip.       No acute displaced osseous pelvic or left hip fracture. Status post left GERALD with intact hardware.   MACRO: None.   Signed  by: Johanna Mattson 9/17/2024 8:27 AM Dictation workstation:   [id number]    MR prostate with [place] boundaries if pirads 3 or above    Result Date: 9/3/2024  Interpreted By:  Huy Dumont,  and Reece Foss STUDY: MR PROSTATE WITH [place] BOUNDARIES IF PIRADS 3 OR ABOVE;  8/30/2024 3:30 pm   INDICATION: Signs/Symptoms:PSA 10.66.  64-year-old male with a history of [place] score 6 prostate adenocarcinoma diagnosed 04/19/2019 (PI-RADS 3 lesion was biopsied) on active surveillance. PSA has been increasing most recently measuring 10.66 on 08/03/2024, previously 7.80 in January of 2024 and 5.18 in December of 2022.   ,C61 Malignant neoplasm of prostate (Multi)   COMPARISON: Prostate MRI 10/23/2022   ACCESSION NUMBER(S): [id number]   ORDERING CLINICIAN: ROBERTO TEE   TECHNIQUE: Multiplanar MRI of the pelvis was obtained including axial, sagittal and coronal T2 weighted SSFSE, axial and sagittal T2 FSE, axial DWI, pre and post gadolinium dynamic T1 GRE sequences. Multiparametric analysis was performed.   17 ML of Dotarem was administered intravenously without immediate complications.   3D post-processing was performed using Funji, on an independent workstation, for the purpose of enabling fusion with ultrasound, and provided it for review.   FINDINGS: PROSTATE VOLUME: The prostate measures  4.6 cm x  2.8 cm  x  6.0 cm in right-to-left, anterior-posterior and craniocaudal dimension.   Prostate weight is estimated at 40.5g. PSA density is 0.26 ng/mL/g.   PROSTATE PARENCHYMA: There is heterogeneous enlargement of the transition zone, consistent with benign prostatic hyperplasia. Previously noted well-defined T2 hypointense nodule in the lateral transitional zone of the mid prostate is more indistinct. It demonstrates similar decreased ADC signal and increased high B value DWI signal and is again consistent with a PI-RADS 3 lesion. No new clinically significant lesions are identified.   EXTRACAPSULAR EXTENSION:  None.   SEMINAL VESICLES: Within normal limits.   PELVIC LYMPH NODES: No abnormally enlarged pelvic lymph nodes are identified.   PERITONEUM: No free or loculated fluid collections are evident in the pelvis.   OTHER ORGANS: Fat containing left inguinal hernia.   BONES: Nonspecific heterogeneity of the bone marrow. No focal lesion in the bone. Interval left hip total arthroplasty. Degenerative changes of the visualized lumbar spine.   Exam Quality: Is T2WI weighted imaging of diagnostic quality:  Yes. T2WI assessment:  Adequate. Is DWI of diagnostic quality:  Yes. DWI assessment:  Adequate. Is DCE of diagnostic quality:  Yes. DCE assessment:  Adequate. PI-QUAL score:  Two or more sequences independently are of diagnostic quality Comments:       1.  Overall stable appearance of a PI-RADS 3 lesion in the left lateral transitional zone in the mid to apical prostate as compared to the 2022 MRI. No new lesion is identified. 2. No pelvic lymphadenopathy. No focal osseous metastatic disease.   I personally reviewed the images/study and I agree with the findings as stated by Pipo Newell MD (resident) . This study was interpreted at [place], [place]..   I personally reviewed the images/study and I agree with the findings as stated. This study was interpreted at [place], [place].   MACRO: None   Signed by: Huy Dumont 9/3/2024 6:18 AM Dictation workstation:   [id number]     Scheduled medications:  influenza, 0.5 mL, intramuscular, During hospitalization  folic acid, 1 mg, oral, Daily  furosemide, 20 mg, oral, Daily  insulin lispro, 0-10 Units, subcutaneous, TID  lactobacillus acidophilus, 1 capsule, oral, Daily  magnesium oxide, 400 mg, oral, Daily  metoprolol tartrate, 50 mg, oral, q12h  NIFEdipine ER, 30 mg, oral, Daily  polyethylene glycol, 17 g, oral, Daily  sulfaSALAzine, 1,000 mg, oral, TID  tamsulosin, 0.8 mg,  oral, Daily      Continuous medications:  sodium chloride 0.9%, 125 mL/hr, Last Rate: 125 mL/hr (09/17/24 1059)      PRN medications:  PRN medications: bisacodyl, bisacodyl, dextrose, dextrose, glucagon, glucagon, guaiFENesin, melatonin, morphine, morphine, ondansetron **OR** ondansetron      Past Medical History  He has a past medical history of Diabetes mellitus (Multi), Encounter for screening for malignant neoplasm of colon (07/30/2019), Hypertension, Unspecified cataract, and Urinary tract infection, site not specified (03/02/2020).    Surgical History  He has a past surgical history that includes Hernia repair (10/14/2015); Excision / repair hydrocele pediatric; Replacement total hip lateral position (Left); and Cardiac electrophysiology procedure (N/A, 3/12/2024).     Social History  He reports that he quit smoking about 44 years ago. His smoking use included cigarettes. He started smoking about 45 years ago. He has a 0.1 pack-year smoking history. He has never used smokeless tobacco. He reports current alcohol use. He reports that he does not use drugs.    Family History  Family History   Problem Relation Name Age of Onset    Hypertension Mother      Emphysema Mother      Lung cancer Father      Hypertension Father      Hypertension Brother      Heart attack Brother      Breast cancer Mother's Sister      Hypertension Mother's Sister      Emphysema Mother's Sister      Colon cancer Father's Sister      Hypertension Father's Sister      Hypertension Father's Brother      Hypertension Maternal Grandmother      Hypertension Maternal Grandfather      Heart attack Maternal Grandfather          Allergies  Penicillins and Erythromycin    Code Status  Full Code     Review of Systems   Constitutional:  Negative for appetite change, chills, fatigue, fever and unexpected weight change.   HENT:  Negative for congestion, ear discharge, ear pain, mouth sores, nosebleeds, sinus pain, sore throat, trouble swallowing and  voice change.    Eyes:  Negative for photophobia, pain, discharge, itching and visual disturbance.   Respiratory:  Negative for apnea, cough, choking, chest tightness, shortness of breath and wheezing.    Cardiovascular:  Negative for chest pain, palpitations and leg swelling.   Gastrointestinal:  Negative for abdominal distention, abdominal pain, blood in stool, constipation, diarrhea, nausea and vomiting.   Endocrine: Negative for cold intolerance, heat intolerance, polydipsia, polyphagia and polyuria.   Genitourinary:  Negative for decreased urine volume, difficulty urinating, dysuria, flank pain, frequency, hematuria and urgency.   Musculoskeletal:  Positive for arthralgias. Negative for back pain, gait problem, myalgias, neck pain and neck stiffness.   Skin:  Negative for color change, pallor and wound.   Allergic/Immunologic: Negative for food allergies and immunocompromised state.   Neurological:  Negative for dizziness, tremors, seizures, syncope, speech difficulty, weakness, light-headedness, numbness and headaches.   Hematological:  Negative for adenopathy. Does not bruise/bleed easily.   Psychiatric/Behavioral:  Negative for confusion, dysphoric mood, hallucinations, sleep disturbance and suicidal ideas. The patient is not nervous/anxious.        Last Recorded Vitals  /90   Pulse (!) 112 Comment: afib  Temp 36.2 °C (97.2 °F) (Temporal)   Resp 18   Wt 83 kg (183 lb)   SpO2 100%      Physical Exam  Vitals reviewed.   Constitutional:       General: He is not in acute distress.  HENT:      Head: Normocephalic and atraumatic.      Right Ear: External ear normal.      Left Ear: External ear normal.      Nose: Nose normal.      Mouth/Throat:      Mouth: Mucous membranes are moist.      Pharynx: Oropharynx is clear.   Eyes:      Extraocular Movements: Extraocular movements intact.      Conjunctiva/sclera: Conjunctivae normal.      Pupils: Pupils are equal, round, and reactive to light.    Cardiovascular:      Rate and Rhythm: Normal rate and regular rhythm.      Pulses: Normal pulses.      Heart sounds: Normal heart sounds. No murmur heard.  Pulmonary:      Effort: Pulmonary effort is normal. No respiratory distress.      Breath sounds: Normal breath sounds. No wheezing, rhonchi or rales.   Chest:      Chest wall: No tenderness.   Abdominal:      General: Bowel sounds are normal. There is no distension.      Palpations: Abdomen is soft. There is no mass.      Tenderness: There is no abdominal tenderness. There is no rebound.   Musculoskeletal:         General: No swelling or deformity.      Cervical back: Normal range of motion.      Right lower leg: No edema.      Left lower leg: No edema.      Comments: Pt unable to extend hip or back   Skin:     General: Skin is warm and dry.      Capillary Refill: Capillary refill takes less than 2 seconds.   Neurological:      General: No focal deficit present.      Mental Status: He is alert and oriented to person, place, and time.   Psychiatric:         Mood and Affect: Mood normal.         Behavior: Behavior normal.         Assessment/Plan   Assessment & Plan  Hyponatremia    Hyponatremia  Pt somewhat chronic. Relates hx reduced sodium intake and increased fluid intake  Continue Slow hydration, hyponatremia workup and nephrology consult    L hip pain [facility] 2023 Alessandra  Difficult exam, unable to extend hip, lie flat, no tenderness  Continue analgesics, PT for mobilization  Attempt to get ortho consult    Cirrhosis/Portal HTN/Familial  hemochromatosis  LFT downtrending  Not drinking alcohol  Does not seem overloaded volume avitia  Follows with Dr Patel, LFT trending down  Continue routine meds    HTN  Continue home meds    Afib, hx afib ablation  Continue rate control, pharmacy to dose warfarin    DM2  Only on metformin  Will hold, cover w sliding scale.         No new Assessment & Plan notes have been filed under this hospital service since the last note was  generated.  Service: Internal Medicine          Kamari Taylor, APRN-CNP    Dragon dictation software was used to dictate this note and thus there may be minor errors in translation/transcription including garbled speech or misspellings. Please contact for clarification if needed.

## 2024-09-17 NOTE — PROGRESS NOTES
Kiko Petersen is a 64 y.o. male admitted for Hyponatremia. Pharmacy reviewed the patient's latsp-ad-moaahaxnq medications and allergies for accuracy.    The list below reflects the PTA list prior to pharmacy medication history. A summary a changes to the PTA medication list has been listed below. Please review each medication in order reconciliation for additional clarification and justification.    Source of information: T2P     Medications added:    Medications modified:  LASIX 40MG -- ADDED PRN  WARFARIN 2.5MG 1/2 TABLET TUE & THURS. AND 1 T ALL OTHER DAYS --> 1/2 T PO every day     Medications to be removed:  FOLIC ACID 1MG  ACIDOPHILUS CAPSULE  MAG-OX 400MG    Medications of concern:      Prior to Admission Medications   Prescriptions Last Dose Informant Patient Reported? Taking?   Lactobacillus acidophilus (Acidophilus) capsule   Yes No   Sig: Take 1 capsule by mouth once daily.   Lasix 40 mg tablet   No No   Sig: Take 0.5 tablets (20 mg) by mouth once daily.   NIFEdipine ER (NIFEdipine CC) 30 mg 24 hr tablet   No No   Sig: Take 1 tablet (30 mg) by mouth once daily.   folic acid (Folvite) 1 mg tablet   No No   Sig: TAKE 1 TABLET BY MOUTH ONCE DAILY   ketoconazole (NIZOral) 2 % cream   Yes No   Sig: Apply topically.   lisinopril 20 mg tablet   No No   Sig: Take 1 tablet (20 mg) by mouth once daily.   magnesium oxide (Mag-Ox) 400 mg (241.3 mg magnesium) tablet   Yes No   Sig: Take 1 tablet (400 mg) by mouth once daily.   metFORMIN (Glucophage) 500 mg tablet   No No   Sig: TAKE 1 TABLET BY MOUTH TWO TIMES A DAY WITH MEALS   metoprolol tartrate (Lopressor) 50 mg tablet   No No   Sig: TAKE 1 TABLET BY MOUTH EVERY 12 HOURS   sulfaSALAzine (Azulfidine) 500 mg tablet   No No   Sig: TAKE 2 TABLETS BY MOUTH THREE TIMES A DAY   tadalafil (Cialis) 5 mg tablet   No No   Sig: TAKE 1 TABLET BY MOUTH EVERY DAY   tamsulosin (Flomax) 0.4 mg 24 hr capsule   No No   Sig: Take 2 capsules (0.8 mg) by mouth once daily.   warfarin  (Coumadin) 2.5 mg tablet   No No   Sig: Take 1.25mg (1/2 tablet ) by mouth Tuesdays and Thursdays and 2.5mg (1 tablet) by mouth all other days or as directed by Coumadin clinic      Facility-Administered Medications: None       JESSIE GOODRICH

## 2024-09-18 ENCOUNTER — APPOINTMENT (OUTPATIENT)
Dept: PHARMACY | Facility: HOSPITAL | Age: 64
End: 2024-09-18
Payer: MEDICARE

## 2024-09-18 ENCOUNTER — APPOINTMENT (OUTPATIENT)
Dept: RADIOLOGY | Facility: HOSPITAL | Age: 64
End: 2024-09-18
Payer: MEDICARE

## 2024-09-18 ENCOUNTER — PHARMACY VISIT (OUTPATIENT)
Dept: PHARMACY | Facility: CLINIC | Age: 64
End: 2024-09-18
Payer: COMMERCIAL

## 2024-09-18 ENCOUNTER — APPOINTMENT (OUTPATIENT)
Dept: GASTROENTEROLOGY | Facility: EXTERNAL LOCATION | Age: 64
End: 2024-09-18
Payer: MEDICARE

## 2024-09-18 PROBLEM — G89.29 CHRONIC HIP PAIN AFTER TOTAL REPLACEMENT OF LEFT HIP JOINT: Status: ACTIVE | Noted: 2023-10-30

## 2024-09-18 PROBLEM — M25.552 CHRONIC HIP PAIN AFTER TOTAL REPLACEMENT OF LEFT HIP JOINT: Status: ACTIVE | Noted: 2023-10-30

## 2024-09-18 PROBLEM — Z96.642 CHRONIC HIP PAIN AFTER TOTAL REPLACEMENT OF LEFT HIP JOINT: Status: ACTIVE | Noted: 2023-10-30

## 2024-09-18 LAB
ANION GAP SERPL CALC-SCNC: 12 MMOL/L (ref 10–20)
BUN SERPL-MCNC: 10 MG/DL (ref 6–23)
CALCIUM SERPL-MCNC: 7.5 MG/DL (ref 8.6–10.3)
CHLORIDE SERPL-SCNC: 97 MMOL/L (ref 98–107)
CO2 SERPL-SCNC: 22 MMOL/L (ref 21–32)
CORTIS AM PEAK SERPL-MSCNC: 25.2 UG/DL (ref 5–20)
CREAT SERPL-MCNC: 0.62 MG/DL (ref 0.5–1.3)
EGFRCR SERPLBLD CKD-EPI 2021: >90 ML/MIN/1.73M*2
ERYTHROCYTE [DISTWIDTH] IN BLOOD BY AUTOMATED COUNT: 14.4 % (ref 11.5–14.5)
GLUCOSE BLD MANUAL STRIP-MCNC: 102 MG/DL (ref 74–99)
GLUCOSE BLD MANUAL STRIP-MCNC: 125 MG/DL (ref 74–99)
GLUCOSE BLD MANUAL STRIP-MCNC: 132 MG/DL (ref 74–99)
GLUCOSE BLD MANUAL STRIP-MCNC: 152 MG/DL (ref 74–99)
GLUCOSE SERPL-MCNC: 120 MG/DL (ref 74–99)
HCT VFR BLD AUTO: 27.5 % (ref 41–52)
HGB BLD-MCNC: 10.2 G/DL (ref 13.5–17.5)
INR PPP: 2 (ref 0.9–1.1)
MCH RBC QN AUTO: 35.7 PG (ref 26–34)
MCHC RBC AUTO-ENTMCNC: 37.1 G/DL (ref 32–36)
MCV RBC AUTO: 96 FL (ref 80–100)
NRBC BLD-RTO: 0 /100 WBCS (ref 0–0)
OSMOLALITY SERPL: 271 MOSM/KG (ref 280–300)
OSMOLALITY UR: 300 MOSM/KG (ref 200–1200)
PLATELET # BLD AUTO: 174 X10*3/UL (ref 150–450)
POTASSIUM SERPL-SCNC: 3.8 MMOL/L (ref 3.5–5.3)
PROTHROMBIN TIME: 22.2 SECONDS (ref 9.8–12.8)
RBC # BLD AUTO: 2.86 X10*6/UL (ref 4.5–5.9)
SODIUM SERPL-SCNC: 127 MMOL/L (ref 136–145)
TSH SERPL-ACNC: 2.21 MIU/L (ref 0.44–3.98)
WBC # BLD AUTO: 5.6 X10*3/UL (ref 4.4–11.3)

## 2024-09-18 PROCEDURE — 1200000002 HC GENERAL ROOM WITH TELEMETRY DAILY

## 2024-09-18 PROCEDURE — 97535 SELF CARE MNGMENT TRAINING: CPT | Mod: GO

## 2024-09-18 PROCEDURE — 36415 COLL VENOUS BLD VENIPUNCTURE: CPT | Performed by: NURSE PRACTITIONER

## 2024-09-18 PROCEDURE — 2500000001 HC RX 250 WO HCPCS SELF ADMINISTERED DRUGS (ALT 637 FOR MEDICARE OP): Performed by: NURSE PRACTITIONER

## 2024-09-18 PROCEDURE — 72100 X-RAY EXAM L-S SPINE 2/3 VWS: CPT

## 2024-09-18 PROCEDURE — 2500000004 HC RX 250 GENERAL PHARMACY W/ HCPCS (ALT 636 FOR OP/ED): Performed by: NURSE PRACTITIONER

## 2024-09-18 PROCEDURE — 85610 PROTHROMBIN TIME: CPT | Performed by: NURSE PRACTITIONER

## 2024-09-18 PROCEDURE — 2500000001 HC RX 250 WO HCPCS SELF ADMINISTERED DRUGS (ALT 637 FOR MEDICARE OP): Performed by: INTERNAL MEDICINE

## 2024-09-18 PROCEDURE — 85027 COMPLETE CBC AUTOMATED: CPT | Performed by: NURSE PRACTITIONER

## 2024-09-18 PROCEDURE — 84443 ASSAY THYROID STIM HORMONE: CPT | Performed by: NURSE PRACTITIONER

## 2024-09-18 PROCEDURE — 2500000002 HC RX 250 W HCPCS SELF ADMINISTERED DRUGS (ALT 637 FOR MEDICARE OP, ALT 636 FOR OP/ED): Performed by: NURSE PRACTITIONER

## 2024-09-18 PROCEDURE — 97166 OT EVAL MOD COMPLEX 45 MIN: CPT | Mod: GO

## 2024-09-18 PROCEDURE — 80048 BASIC METABOLIC PNL TOTAL CA: CPT | Performed by: NURSE PRACTITIONER

## 2024-09-18 PROCEDURE — 99233 SBSQ HOSP IP/OBS HIGH 50: CPT | Performed by: INTERNAL MEDICINE

## 2024-09-18 PROCEDURE — 82947 ASSAY GLUCOSE BLOOD QUANT: CPT

## 2024-09-18 RX ORDER — LISINOPRIL 20 MG/1
20 TABLET ORAL DAILY
Status: DISCONTINUED | OUTPATIENT
Start: 2024-09-18 | End: 2024-09-20 | Stop reason: HOSPADM

## 2024-09-18 RX ORDER — LOPERAMIDE HYDROCHLORIDE 2 MG/1
4 CAPSULE ORAL ONCE
Status: COMPLETED | OUTPATIENT
Start: 2024-09-18 | End: 2024-09-18

## 2024-09-18 RX ORDER — WARFARIN 2.5 MG/1
1.25 TABLET ORAL ONCE
Status: COMPLETED | OUTPATIENT
Start: 2024-09-18 | End: 2024-09-18

## 2024-09-18 RX ORDER — CYCLOBENZAPRINE HCL 10 MG
5 TABLET ORAL 3 TIMES DAILY
Status: DISCONTINUED | OUTPATIENT
Start: 2024-09-18 | End: 2024-09-20 | Stop reason: HOSPADM

## 2024-09-18 ASSESSMENT — ENCOUNTER SYMPTOMS
DIARRHEA: 0
CONFUSION: 0
DIAPHORESIS: 0
CONSTIPATION: 0
ARTHRALGIAS: 1
SORE THROAT: 0
COUGH: 0
FACIAL SWELLING: 0
CHILLS: 0
CHEST TIGHTNESS: 0
FEVER: 0
CHOKING: 0
WOUND: 0
PALPITATIONS: 0
WHEEZING: 0
VOMITING: 0
HEMATURIA: 0
NECK PAIN: 0
FATIGUE: 0
DYSURIA: 0
BACK PAIN: 0
JOINT SWELLING: 0
SHORTNESS OF BREATH: 0
ABDOMINAL PAIN: 0
NERVOUS/ANXIOUS: 0
HALLUCINATIONS: 0
LIGHT-HEADEDNESS: 0
WEAKNESS: 0
NAUSEA: 0
AGITATION: 0

## 2024-09-18 ASSESSMENT — COGNITIVE AND FUNCTIONAL STATUS - GENERAL
CLIMB 3 TO 5 STEPS WITH RAILING: A LITTLE
DAILY ACTIVITIY SCORE: 23
TOILETING: A LITTLE
WALKING IN HOSPITAL ROOM: A LITTLE
WALKING IN HOSPITAL ROOM: A LITTLE
PERSONAL GROOMING: A LITTLE
HELP NEEDED FOR BATHING: A LOT
DRESSING REGULAR LOWER BODY CLOTHING: A LOT
HELP NEEDED FOR BATHING: A LITTLE
PERSONAL GROOMING: A LITTLE
DRESSING REGULAR UPPER BODY CLOTHING: A LITTLE
TOILETING: A LITTLE
EATING MEALS: A LITTLE
STANDING UP FROM CHAIR USING ARMS: A LITTLE
TOILETING: A LITTLE
MOBILITY SCORE: 21
EATING MEALS: A LITTLE
CLIMB 3 TO 5 STEPS WITH RAILING: A LOT
DAILY ACTIVITIY SCORE: 20
MOBILITY SCORE: 21
DAILY ACTIVITIY SCORE: 16

## 2024-09-18 ASSESSMENT — PAIN DESCRIPTION - DESCRIPTORS
DESCRIPTORS: ACHING
DESCRIPTORS: ACHING

## 2024-09-18 ASSESSMENT — PAIN - FUNCTIONAL ASSESSMENT
PAIN_FUNCTIONAL_ASSESSMENT: 0-10

## 2024-09-18 ASSESSMENT — ACTIVITIES OF DAILY LIVING (ADL)
HOME_MANAGEMENT_TIME_ENTRY: 8
BATHING_ASSISTANCE: MODERATE
ADL_ASSISTANCE: INDEPENDENT

## 2024-09-18 ASSESSMENT — PAIN SCALES - GENERAL
PAINLEVEL_OUTOF10: 4
PAINLEVEL_OUTOF10: 4
PAINLEVEL_OUTOF10: 2
PAINLEVEL_OUTOF10: 0 - NO PAIN

## 2024-09-18 NOTE — ASSESSMENT & PLAN NOTE
L hip pain [facility] 2023 Alessandra  Difficult exam, unable to extend hip, lie flat, no tenderness  Continue analgesics, PT for mobilization  Attempt to get ortho consult  9/18: No hip fracture seen on exam. Await ortho input.  Pain may be recurrent rather than chronic.  Has some numbness associated.  X-ray looked unremarkable.  Will check x-ray of his L-spine.  Adding Flexeril.  Reassess in AM.  9/19: L spine x-rays with degenerative changes and facet arthropathy.  Continues to have numbness and burning sensation in his quadriceps region on the left upper leg and hip.  States that this leg still feels heavy.  Does have some noticeable weakness on exam.  Will check an MRI for evaluation.  Despite this he is able to get up and ambulate with a cane.  Awaiting PT evaluation.  Awaiting input from orthopedics.  Consider referral to outpatient orthopedic spine pending results.  9/20: Appreciate input from orthopedic surgery.  Recommend patient wear his seatbelt a little higher.  Avoid large wallets.  Follow-up with PCP and/or orthopedics as outpatient.  Wheeled walker on discharge.  Okay to discharge to home.  Home PT ordered.

## 2024-09-18 NOTE — PROGRESS NOTES
Social work consult placed for discharge planning. SW reviewed pt's chart and communicated with TCC. No SW needs foreseen at this time. SW signing off; available upon request.    Yeison Hein, MSW, LSW ([id number])   Care Transitions

## 2024-09-18 NOTE — PROGRESS NOTES
Kiko Petersen is a 64 y.o. male on day 0 of admission presenting with Hyponatremia.    Review of Systems   Constitutional:  Negative for chills, diaphoresis, fatigue and fever.   HENT:  Negative for congestion, facial swelling, sneezing and sore throat.    Respiratory:  Negative for cough, choking, chest tightness, shortness of breath and wheezing.    Cardiovascular:  Negative for chest pain, palpitations and leg swelling.   Gastrointestinal:  Negative for abdominal pain, constipation, diarrhea, nausea and vomiting.   Genitourinary:  Negative for dysuria, hematuria and urgency.   Musculoskeletal:  Positive for arthralgias. Negative for back pain, gait problem, joint swelling and neck pain.   Skin:  Negative for rash and wound.   Neurological:  Negative for syncope, weakness and light-headedness.   Psychiatric/Behavioral:  Negative for agitation, confusion and hallucinations. The patient is not nervous/anxious.    All other systems reviewed and are negative.     Subjective   Kiko Petersen is a 64 y.o. male with PMHx s/f prostate cancer, portal hypertension, atrial fibrillation status post ablation, anticoagulation therapy with warfarin, ulcerative colitis, hypertension, obstructive sleep apnea, history of left total hip arthroplasty in March 2023 at [facility] presenting with plaint of severe left hip pain.  The patient's symptoms started last Sunday while on way to watch a football game the patient had been driving in his car and developed left hip pain.  Patient had increased pain with walking patient had some easing of his pain on Monday seem to improve and even go away for a period of time then patient woke up suddenly this morning with a sharp pain and had difficulty walking to the bathroom.  Patient had tried taking some oxycodone at home but that was not really helpful with this pain so he come into the emergency department for evaluation.  Patient denies any recent fall or trauma he does not have  any fevers or chills he does not have any ongoing issues with severe diarrhea no nausea or vomiting.  Vital signs show temperature 97.2 heart rate 112 respiratory rate 18 blood pressure 142/90 SpO2 100% on room air.  Chemistry panel shows a glucose 150 sodium 123 chloride 98 bicarb 20 AST 66 total bilirubin 1.7 calcium 7.9 remainder of chemistry panel within normal limits.  CBC shows no leukocytosis there is anemia with hemoglobin 11.5 hematocrit 32.4 platelets 199 INR was measured at 4.  An x-ray of the left hip shows the patient to be status post left total hip arthroplasty with all hardware intact no acute displaced osseous or pelvic or left hip fractures.  A CT scan of the head was done by the ER provider due to some weakness in the left leg there were only age-related changes no acute intracranial process noted.  Patient was given Percocet was unable to get up and ambulate the ED provider wanted to admit the patient for further evaluation and management of his hyponatremia as well as his inability to walk due to severe left hip pain.     9/18: Patient seen.  X-ray of left hip without acute pathology.  Patient states she was advised not.  Wonders if this is related to a nerve issue.  Denies any pain in his back or recent back injury or fall.  In the past he has had improvement with muscle relaxants.  Will schedule some Flexeril.  Also complains of some loose stools related to his ulcerative colitis.  Continue sulfasalazine and will give 1 dose of Imodium.  Awaiting input from orthopedics.  Sodium is improved.       Objective     Last Recorded Vitals  /79 (BP Location: Left arm, Patient Position: Lying)   Pulse 75   Temp 36.5 °C (97.7 °F) (Temporal)   Resp 16   Wt 83 kg (183 lb)   SpO2 96%   Intake/Output last 3 Shifts:    Intake/Output Summary (Last 24 hours) at 9/18/2024 1503  Last data filed at 9/18/2024 1328  Gross per 24 hour   Intake 3443.75 ml   Output --   Net 3443.75 ml       Admission  Weight  Weight: 83 kg (183 lb) (09/17/24 0746)    Daily Weight  09/17/24 : 83 kg (183 lb)      Physical Exam  Musculoskeletal:         General: Tenderness present.      Comments: Unable to extend hip or back          Lab Results  Results for orders placed or performed during the hospital encounter of 09/17/24 (from the past 24 hour(s))   Osmolality, Urine   Result Value Ref Range    Osmolality, Urine Random 300 200 - 1,200 mOsm/kg   Sodium, Urine Random   Result Value Ref Range    Sodium, Urine Random 33 mmol/L    Creatinine, Urine Random 84.5 20.0 - 370.0 mg/dL    Sodium/Creatinine Ratio 39 Not established. mmol/g Creat   Urinalysis with Reflex Microscopic   Result Value Ref Range    Color, Urine Yellow Straw, Yellow    Appearance, Urine Clear Clear    Specific Gravity, Urine 1.025 1.005 - 1.035    pH, Urine 8.0 5.0, 5.5, 6.0, 6.5, 7.0, 7.5, 8.0    Protein, Urine 30 (1+) (A) NEGATIVE, 10 (TRACE) mg/dL    Glucose, Urine NEGATIVE NEGATIVE mg/dL    Blood, Urine NEGATIVE NEGATIVE    Ketones, Urine 5 (TRACE) (A) NEGATIVE mg/dL    Bilirubin, Urine NEGATIVE NEGATIVE    Urobilinogen, Urine 2 (1+) (N) NORM mg/dL    Nitrite, Urine NEGATIVE NEGATIVE    Leukocyte Esterase, Urine NEGATIVE NEGATIVE   Microscopic Only, Urine   Result Value Ref Range    WBC, Urine 1-5 1-5, NONE /HPF    RBC, Urine 1-2 NONE, 1-2, 3-5 /HPF    Squamous Epithelial Cells, Urine 1-9 (SPARSE) Reference range not established. /HPF    Budding Yeast, Urine PRESENT (A) NONE /HPF    Mucus, Urine FEW Reference range not established. /LPF    Hyaline Casts, Urine 1+ (A) NONE /LPF   Cortisol AM   Result Value Ref Range    Cortisol  A.M. 25.2 (H) 5.0 - 20.0 ug/dL   Osmolality   Result Value Ref Range    Osmolality, Serum 271 (L) 280 - 300 mOsm/kg   Sodium   Result Value Ref Range    Sodium 126 (L) 136 - 145 mmol/L   POCT GLUCOSE   Result Value Ref Range    POCT Glucose 133 (H) 74 - 99 mg/dL   POCT GLUCOSE   Result Value Ref Range    POCT Glucose 147 (H) 74 - 99  mg/dL   CBC   Result Value Ref Range    WBC 5.6 4.4 - 11.3 x10*3/uL    nRBC 0.0 0.0 - 0.0 /100 WBCs    RBC 2.86 (L) 4.50 - 5.90 x10*6/uL    Hemoglobin 10.2 (L) 13.5 - 17.5 g/dL    Hematocrit 27.5 (L) 41.0 - 52.0 %    MCV 96 80 - 100 fL    MCH 35.7 (H) 26.0 - 34.0 pg    MCHC 37.1 (H) 32.0 - 36.0 g/dL    RDW 14.4 11.5 - 14.5 %    Platelets 174 150 - 450 x10*3/uL   Basic metabolic panel   Result Value Ref Range    Glucose 120 (H) 74 - 99 mg/dL    Sodium 127 (L) 136 - 145 mmol/L    Potassium 3.8 3.5 - 5.3 mmol/L    Chloride 97 (L) 98 - 107 mmol/L    Bicarbonate 22 21 - 32 mmol/L    Anion Gap 12 10 - 20 mmol/L    Urea Nitrogen 10 6 - 23 mg/dL    Creatinine 0.62 0.50 - 1.30 mg/dL    eGFR >90 >60 mL/min/1.73m*2    Calcium 7.5 (L) 8.6 - 10.3 mg/dL   TSH with reflex to Free T4 if abnormal   Result Value Ref Range    Thyroid Stimulating Hormone 2.21 0.44 - 3.98 mIU/L   Protime-INR   Result Value Ref Range    Protime 22.2 (H) 9.8 - 12.8 seconds    INR 2.0 (H) 0.9 - 1.1   POCT GLUCOSE   Result Value Ref Range    POCT Glucose 132 (H) 74 - 99 mg/dL   POCT GLUCOSE   Result Value Ref Range    POCT Glucose 125 (H) 74 - 99 mg/dL        Image Results  CT head wo IV contrast  Narrative: Interpreted By:  Marco Phelps,   STUDY:  CT HEAD WO IV CONTRAST;  9/17/2024 8:29 am      INDICATION:  Signs/Symptoms:left leg weakness.          COMPARISON:  07/23/2023.      ACCESSION NUMBER(S):  [id number]      ORDERING CLINICIAN:  ARLET EDWARD      TECHNIQUE:  Contiguous unenhanced axial images were obtained through the brain.      FINDINGS:  INTRACRANIAL:  Mild generalized atrophy is again seen. Mild nonspecific irregular  low-attenuation changes throughout the periventricular and  subcortical white matter are similar to prior, most likely related to  chronic microvascular disease.  No acute intracranial bleed, midline  shift, or mass effect is seen. Gray-white differentiation is  maintained. No extra-axial fluid collection or  hydrocephalus.  Irregular atherosclerotic calcifications present in the internal  carotid artery and vertebral artery segments.      Bones are intact.      EXTRACRANIAL:  Visualized paranasal sinuses and mastoids are clear.      Impression: Age-related changes similar to prior. No acute intracranial process.              MACRO:  None.      Signed by: Marco Phelps 9/17/2024 8:39 AM  Dictation workstation:   [id number]  XR hip left with pelvis when performed 2 or 3 views  Narrative: Interpreted By:  Johanna Mattson,   STUDY:  XR HIP LEFT WITH PELVIS WHEN PERFORMED 2 OR 3 VIEWS;  9/17/2024 8:21  am      INDICATION:  Signs/Symptoms:pain.          COMPARISON:  05/11/2023      ACCESSION NUMBER(S):  [id number]      ORDERING CLINICIAN:  ARLET EDWARD      FINDINGS:  Frontal view of the pelvis and two views of the left hip obtained.      No acute displaced osseous pelvic fracture. Left hip arthroplasty  with 2 screws fixing the acetabular component in grossly stable  anatomic alignment. No abnormal lucency or acute fracture around the  hardware. Degenerative changes of the right hip.      Impression: No acute displaced osseous pelvic or left hip fracture. Status post  left GERALD with intact hardware.      MACRO:  None.      Signed by: Johanna Mattson 9/17/2024 8:27 AM  Dictation workstation:   [id number]       Assessment/Plan     Chronic hip pain after total replacement of left hip joint  Assessment & Plan  L hip pain [facility] 2023 Alessandra  Difficult exam, unable to extend hip, lie flat, no tenderness  Continue analgesics, PT for mobilization  Attempt to get ortho consult  9/18: No hip fracture seen on exam. Await ortho input.  Pain may be recurrent rather than chronic.  Has some numbness associated.  X-ray looked unremarkable.  Will check x-ray of his L-spine.  Adding Flexeril.  Reassess in AM.    * Hyponatremia  Assessment & Plan  Pt somewhat chronic. Relates hx reduced sodium intake and increased fluid intake  Continue Slow  hydration, hyponatremia workup and nephrology consult  9/18: Na improved at 127.    Hepatic cirrhosis (Multi)  Assessment & Plan  Cirrhosis/Portal HTN/Familial  hemochromatosis  LFT downtrending  Not drinking alcohol  Does not seem overloaded volume avitia  Follows with Dr Patel, LFT trending down  Continue routine meds    Paroxysmal atrial fibrillation (Multi)  Assessment & Plan  Afib, hx afib ablation  Continue rate control, pharmacy to dose warfarin    Essential hypertension  Assessment & Plan  Continue home meds    Hereditary hemochromatosis (CMS-HCC)  Assessment & Plan  Causing cirrhosis    Type 2 diabetes mellitus without complication, without long-term current use of insulin (Multi)  Assessment & Plan  Only on metformin  Will hold, cover w sliding scale.                    Tye Sheets MD

## 2024-09-18 NOTE — ASSESSMENT & PLAN NOTE
Continue home meds   In the next few weeks you may receive a [employer] survey regarding your most recent clinic visit with us. Please take a few moments to accurately evaluate your visit. We strive to provide you with the best medical care. Your feedback will assist us in achieving this. Again, thank you for your time and we look forward to your next visit. If we need to contact you regarding any test results, we will make 2 attempts to reach you at the number you have listed during your office visit today. If we are unable to reach you, a letter with your results and any further instructions will be mailed to you home.

## 2024-09-18 NOTE — CARE PLAN
The patient's goals for the shift include      The clinical goals for the shift include  patient will remain free from fall/injury this shift      Problem: Pain - Adult  Goal: Verbalizes/displays adequate comfort level or baseline comfort level  Outcome: Progressing     Problem: Safety - Adult  Goal: Free from fall injury  Outcome: Progressing     Problem: Discharge Planning  Goal: Discharge to home or other facility with appropriate resources  Outcome: Progressing     Problem: Chronic Conditions and Co-morbidities  Goal: Patient's chronic conditions and co-morbidity symptoms are monitored and maintained or improved  Outcome: Progressing     Problem: Pain  Goal: Takes deep breaths with improved pain control throughout the shift  Outcome: Progressing  Goal: Turns in bed with improved pain control throughout the shift  Outcome: Progressing  Goal: Walks with improved pain control throughout the shift  Outcome: Progressing  Goal: Performs ADL's with improved pain control throughout shift  Outcome: Progressing  Goal: Participates in PT with improved pain control throughout the shift  Outcome: Progressing  Goal: Free from opioid side effects throughout the shift  Outcome: Progressing  Goal: Free from acute confusion related to pain meds throughout the shift  Outcome: Progressing

## 2024-09-18 NOTE — PROGRESS NOTES
"      Nephrology Progress Note      Nephrology following for hyponatremia.   Events over night:   5 loose stools  Pain is better controlled    /76 (BP Location: Left arm, Patient Position: Lying)   Pulse 80   Temp 36.6 °C (97.8 °F) (Temporal)   Resp 18   Ht 1.778 m (5' 10\")   Wt 83 kg (183 lb)   SpO2 96%   BMI 26.26 kg/m²     Input / Output:  24 HR:   Intake/Output Summary (Last 24 hours) at 9/18/2024 1229  Last data filed at 9/18/2024 0910  Gross per 24 hour   Intake 3203.75 ml   Output --   Net 3203.75 ml       Physical Exam   Alert and oriented x 3 NAD  Neck: no JVD  CV: RRR  Lungs: CTA bilaterally  Abd: soft, NT, ND   Ext: no lower extremity edema    Scheduled medications  influenza, 0.5 mL, intramuscular, During hospitalization  folic acid, 1 mg, oral, Daily  furosemide, 20 mg, oral, Daily  insulin lispro, 0-10 Units, subcutaneous, TID  lactobacillus acidophilus, 1 capsule, oral, Daily  lisinopril, 20 mg, oral, Daily  magnesium oxide, 400 mg, oral, Daily  metoprolol tartrate, 50 mg, oral, q12h  NIFEdipine ER, 30 mg, oral, Daily  polyethylene glycol, 17 g, oral, Daily  sulfaSALAzine, 1,000 mg, oral, TID  tamsulosin, 0.8 mg, oral, Daily  warfarin, 1.25 mg, oral, Once      Continuous medications  sodium chloride 0.9%, 75 mL/hr, Last Rate: 75 mL/hr (09/18/24 0508)      PRN medications  PRN medications: bisacodyl, bisacodyl, dextrose, dextrose, glucagon, glucagon, guaiFENesin, melatonin, morphine, morphine, ondansetron **OR** ondansetron   Results from last 7 days   Lab Units 09/18/24  0421 09/17/24  1556 09/17/24  0802   SODIUM mmol/L 127*   < > 123*   POTASSIUM mmol/L 3.8  --  4.1   CHLORIDE mmol/L 97*  --  90*   CO2 mmol/L 22  --  20*   BUN mg/dL 10  --  9   CREATININE mg/dL 0.62  --  0.73   CALCIUM mg/dL 7.5*  --  7.9*   PROTEIN TOTAL g/dL  --   --  6.6   BILIRUBIN TOTAL mg/dL  --   --  1.7*   ALK PHOS U/L  --   --  98   ALT U/L  --   --  37   AST U/L  --   --  66*   GLUCOSE mg/dL 120*  --  150*    " < > = values in this interval not displayed.           Results from last 7 days   Lab Units 09/18/24  0421 09/17/24  0802   WBC AUTO x10*3/uL 5.6 5.9   HEMOGLOBIN g/dL 10.2* 11.5*   HEMATOCRIT % 27.5* 32.4*   PLATELETS AUTO x10*3/uL 174 199        Assessment & Plan:   Patient is 64 y.o. male who is admitted to hospital for left hip pain and hyponatremia. Nephrology consulted in view of hyponatremia.     Hyponatremia  -Acute on chronic issue in the setting of cirrhosis   -patient has not been on diuretics  -He is actually had poor p.o. intake due to nausea   -sodium level in 1 week ago was 125  -Sodium at presentation now is 123  -Could be hypovolemic but may also have ADH increased from acute pain and nausea.  -Improving with volume expansion so far, receiving p.o. Lasix simultaneously        Cirrhosis-hemochromatosis, history of EtOH dependence  Hypertension-accelerated due to pain           Recommendations:   -Serum sodium is trending up with volume expansion  -Okay to continue IV fluid today but can stop if p.o. intake is good  -Okay to continue Lasix p.o.    Please message me through [employer] chat with any questions or concerns.     Kim Hilliard DO  9/18/2024  12:29 PM     [facility]    [address], [address]   [place] [address]  Office: [phone]

## 2024-09-18 NOTE — ASSESSMENT & PLAN NOTE
Pt somewhat chronic. Relates hx reduced sodium intake and increased fluid intake  Continue Slow hydration, hyponatremia workup and nephrology consult  9/18: Na improved at 127.  9/19: Na is 129.  9/20: Follow-up with nephrology as outpatient.  Hyponatremia is likely secondary to occasional Lasix and pain.

## 2024-09-18 NOTE — PROGRESS NOTES
09/18/24 1210   Discharge Planning   Living Arrangements Alone   Support Systems Children   Type of Residence Private residence   Expected Discharge Disposition Home   Does the patient need discharge transport arranged? Yes   RoundTrip coordination needed? Yes     Discharge assessment complete. Patient lives at home alone. Patient confirmed PCP as Ken. Patient is overall independent at home however the last three days has been utilizing a cane. Patient states that he had a hip replacement March 2023 and everything was great until a few days ago. Patient states he woke up in pain in the middle of the night and since then has been experiencing numbness on his left side where is hip replacement was complete. Patient states that he was/is concerned that it is dislocated since he is aware that dislocation can be an issue with hip surgery. Patient states that prior to the last three days he was able to grocery shop and do all things needed on his own with out issues. Ortho consulted. PT/OT pending. Referral to [facility] requested. [facility] following.

## 2024-09-18 NOTE — PROGRESS NOTES
Pharmacy Consult for Warfarin (Coumadin) Management - Daily Progress Note     Coumadin clinic pt. Pt. Was on 1.25mg daily last visit with INR of 3; came in yesterday with INR of 4, so dose was held, pt's INR dropped to 2 today, will put back on 1.25 x1 since rapit metabolizer, the hope is will rebound tomorrow.  LFT's trending down as well.    Labs  POC INR   Date Value Ref Range Status   09/11/2024 3.00 2.00 - 3.00 Final   09/04/2024 3.20 (A) 2.00 - 3.00 Final   08/28/2024 1.70 (A) 2.00 - 3.00 Corrected     INR   Date Value Ref Range Status   09/18/2024 2.0 (H) 0.9 - 1.1 Final   09/17/2024 4.0 (H) 0.9 - 1.1 Final     Review  Warfarin Indication: Atrial fibrillation or flutter  Target INR: 2 - 2.5       Callum Madrigal, LuanneD   DISPLAY PLAN FREE TEXT

## 2024-09-18 NOTE — PROGRESS NOTES
Occupational Therapy    Evaluation    Patient Name: Kiko Petersen  MRN: [id number]  Department: [facility] E  Room: [address] [place], [place] [address]-A  Today's Date: 9/18/2024  Time Calculation  Start Time: 1058  Stop Time: 1116  Time Calculation (min): 18 min        09/18/24 1058   Inpatient Plan   Equipment Recommended upon Discharge   (TBD)   Treatment Interventions ADL retraining;Functional transfer training;UE strengthening/ROM;Endurance training;Patient/family training;Equipment evaluation/education;Neuromuscular reeducation;Compensatory technique education   OT Frequency 3 times per week   OT - OK to Discharge Yes  (ok to dc to next level of care once medically stable)   OT Discharge Recommendations Moderate intensity level of continued care  (pending progress)   OT Recommended Transfer Status Minimal assist;Assist of 1          Assessment:  OT Assessment: OT eval completed. Pt. appears below functional baseline. Pt presents with impaired balance, endurance, strength which results in functional limitations with mobility and ADLs. Limited current social social and living environment. Current level of assist requires CGA to MIN A +1 for mobility and SBA to Mod A for ADls. Pt woule benefit from further OT to ensure safe return to PLOF.  Prognosis: Good  Barriers to Discharge: Inaccessible home environment, Decreased caregiver support  Evaluation/Treatment Tolerance: Patient limited by fatigue, Patient limited by pain  End of Session Communication: PCT/NA/CTA (pt mobility status and pt location)  End of Session Patient Position: Bed, 3 rail up, Alarm on (call light within reach)  OT Assessment Results: Decreased ADL status, Decreased upper extremity strength, Decreased endurance, Decreased functional mobility, Decreased IADLs  Prognosis: Good  Barriers to Discharge: Inaccessible home environment, Decreased caregiver support  Evaluation/Treatment Tolerance: Patient limited by fatigue, Patient limited by pain  Strengths: Capable of completing  "ADLs semi/independent  Barriers to Participation: Insight into problems, Support of extended family/friends  Plan:  Treatment Interventions: ADL retraining, Functional transfer training, UE strengthening/ROM, Endurance training, Patient/family training, Equipment evaluation/education, Neuromuscular reeducation, Compensatory technique education  OT Frequency: 3 times per week  OT Discharge Recommendations: Moderate intensity level of continued care (pending progress)  Equipment Recommended upon Discharge:  (TBD)  OT Recommended Transfer Status: Minimal assist, Assist of 1  OT - OK to Discharge: Yes (ok to dc to next level of care once medically stable)  Treatment Interventions: ADL retraining, Functional transfer training, UE strengthening/ROM, Endurance training, Patient/family training, Equipment evaluation/education, Neuromuscular reeducation, Compensatory technique education    Subjective   Current Problem:  1. Hyponatremia        2. Left hip pain          General:  General  Reason for Referral: Impaired ADLs and Functional  Mobility (DX: Hyponatremia;Chronic hip pain after total replacement of left hip joint(per MD - 9/18: CTNo hip fracture (- dislocation). Awaiting ortho .  Pain may be recurrent rather than chronic.  Has some numbness LLE associated.  x-ray of his L-spine pending)  Referred By: KAYLEEN Blas-CNP (chart reviewed + referral received)  Past Medical History Relevant to Rehab: PMH: L hip pain LTHA 2023 [facility]andra; Hepatic cirrhosis; Type 2 diabetes mellitus;Paroxysmal atrial fibrillation; HTN  Family/Caregiver Present: No  Prior to Session Communication: Bedside nurse, PCT/NA/CTA (RN approved therapy)  Patient Position Received: Alarm off, not on at start of session (Up on BSC upon arrival to room, gowned, personal underwear, hospital socks, IV infusing)    General Comment: Pt seen in room 2311. pt agreeable to OT eval. pt up on BSC upon  arrival to room. Pt w/ c/o of \" no feeling\" " LLE    Precautions:  Medical Precautions: Fall precautions            Pain:  Pain Assessment  Pain Assessment: 0-10  0-10 (Numeric) Pain Score: 4 (w/ movement)  Pain Type: Acute pain  Pain Location: Hip  Pain Orientation: Left  Pain Descriptors: Aching  Pain Interventions: Repositioned (REST)    Objective   Cognition:  Overall Cognitive Status: Within Functional Limits  Insight: Mild           Home Living:  Type of Home: Apartment  Lives With: Alone  Home Layout: One level  Home Access: Stairs to enter with rails  Entrance Stairs-Number of Steps: 1  Bathroom Shower/Tub: Walk-in shower  Home Living Comments: works at car wash part-time. (+) drives, (+ )chores  Prior Function:  Level of [place]: Independent with ADLs and functional transfers, Independent with homemaking with ambulation  ADL Assistance: Independent  Homemaking Assistance: Independent  Ambulatory Assistance: Independent (cane  PRN)  IADL History:     ADL:  Eating Assistance: Stand by (anticipate w/ set -up)  Grooming Assistance: Stand by (seated position)  Bathing Assistance: Moderate (Anticipate seated position. pt able to peform nai and buttocks hygiene seated on BSC)  UE Dressing Assistance: Stand by (Simulated during UE AROM screen)  LE Dressing Assistance: Moderate (anticipate seated level.)  Toileting Assistance with Device: Stand by (pt able to peform nai and buttocks hygiene seated on BSC)  Toileting Deficit: Bedside commode, Verbal cueing, Supervison/safety, Increased time to complete, Setup, Clothing management up, Clothing management down, Perineal hygiene  Activity Tolerance:  Endurance: Decreased tolerance for upright activites  Bed Mobility/Transfers: Bed Mobility  Bed Mobility: Yes  Bed Mobility 1  Bed Mobility 1: Sitting to supine, Scooting  Level of Assistance 1: Contact guard, Minimum assistance    Transfers  Transfer: Yes  Transfer 1  Transfer From 1: Commode-standard to  Transfer to 1: Stand  Technique 1: Sit to stand, Stand  to sit  Transfer Level of Assistance 1: Contact guard, Minimum assistance  Transfers 2  Transfer From 2: Stand to  Transfer to 2: Bed  Technique 2: Stand pivot, Sit to stand, Stand to sit  Transfer Level of Assistance 2: Contact guard, Minimum assistance      Functional Mobility:     Sitting Balance:     Standing Balance:  Static Standing Balance  Static Standing-Balance Support: No upper extremity supported  Static Standing-Level of Assistance: Minimum assistance   Modalities:     Vision:Vision - Basic Assessment  Current Vision: No visual deficits  Sensation:  Light Touch: Severe deficits in the LLE  Strength:  Strength Comments: No formal MMT applied. appears wfl for basic mobility and ADls  Perception:  Inattention/Neglect: Appears intact  Coordination:  Movements are Fluid and Coordinated: Yes   Hand Function:  Gross Grasp: Functional  Coordination: Functional  Extremities: RUE   RUE : Within Functional Limits and LUE   LUE: Within Functional Limits        Outcome Measures:[facility] Daily Activity  Putting on and taking off regular lower body clothing: A lot  Bathing (including washing, rinsing, drying): A lot  Putting on and taking off regular upper body clothing: A little  Toileting, which includes using toilet, bedpan or urinal: A little  Taking care of personal grooming such as brushing teeth: A little  Eating Meals: A little (set-up)  Daily Activity - Total Score: 16        Education Documentation  Body Mechanics, taught by Nicole Chang OT at 9/18/2024  4:25 PM.  Learner: Patient  Readiness: Acceptance  Method: Demonstration  Response: Needs Reinforcement    Precautions, taught by Nicole Chang OT at 9/18/2024  4:25 PM.  Learner: Patient  Readiness: Acceptance  Method: Demonstration  Response: Needs Reinforcement    ADL Training, taught by Nicole Chang OT at 9/18/2024  4:25 PM.  Learner: Patient  Readiness: Acceptance  Method: Demonstration  Response: Needs Reinforcement    Education Comments  No comments  found.        OP EDUCATION:       Goals:  Encounter Problems       Encounter Problems (Active)       ADLs       Patient with complete lower body dressing with stand by assist level of assistance donning and doffing all LE clothes  with PRN adaptive equipment while edge of bed  and standing       Start:  09/18/24    Expected End:  09/25/24               MOBILITY       Patient will perform Functional mobility min Household distances/Community Distances with stand by assist level of assistance and least restrictive device in order to improve safety and functional mobility.       Start:  09/18/24    Expected End:  09/25/24               TRANSFERS       Patient will complete functional transfer to chair with least restrictive device with stand by assist level of assistance.       Start:  09/18/24    Expected End:  09/25/24

## 2024-09-18 NOTE — ASSESSMENT & PLAN NOTE
Cirrhosis/Portal HTN/Familial  hemochromatosis  LFT downtrending  Not drinking alcohol  Does not seem overloaded volume avitia  Follows with Dr Patel, LFT trending down  Continue routine meds

## 2024-09-19 ENCOUNTER — HOME HEALTH ADMISSION (OUTPATIENT)
Dept: HOME HEALTH SERVICES | Facility: HOME HEALTH | Age: 64
End: 2024-09-19
Payer: MEDICARE

## 2024-09-19 ENCOUNTER — APPOINTMENT (OUTPATIENT)
Dept: RADIOLOGY | Facility: HOSPITAL | Age: 64
End: 2024-09-19
Payer: MEDICARE

## 2024-09-19 PROBLEM — E87.6 HYPOKALEMIA: Status: ACTIVE | Noted: 2024-09-19

## 2024-09-19 LAB
ANION GAP SERPL CALC-SCNC: 10 MMOL/L (ref 10–20)
BUN SERPL-MCNC: 10 MG/DL (ref 6–23)
CALCIUM SERPL-MCNC: 7.2 MG/DL (ref 8.6–10.3)
CHLORIDE SERPL-SCNC: 98 MMOL/L (ref 98–107)
CO2 SERPL-SCNC: 24 MMOL/L (ref 21–32)
CREAT SERPL-MCNC: 0.63 MG/DL (ref 0.5–1.3)
EGFRCR SERPLBLD CKD-EPI 2021: >90 ML/MIN/1.73M*2
ERYTHROCYTE [DISTWIDTH] IN BLOOD BY AUTOMATED COUNT: 14.5 % (ref 11.5–14.5)
GLUCOSE BLD MANUAL STRIP-MCNC: 117 MG/DL (ref 74–99)
GLUCOSE BLD MANUAL STRIP-MCNC: 121 MG/DL (ref 74–99)
GLUCOSE BLD MANUAL STRIP-MCNC: 152 MG/DL (ref 74–99)
GLUCOSE BLD MANUAL STRIP-MCNC: 155 MG/DL (ref 74–99)
GLUCOSE SERPL-MCNC: 104 MG/DL (ref 74–99)
HCT VFR BLD AUTO: 25.9 % (ref 41–52)
HGB BLD-MCNC: 9.1 G/DL (ref 13.5–17.5)
INR PPP: 1.3 (ref 0.9–1.1)
MCH RBC QN AUTO: 34.6 PG (ref 26–34)
MCHC RBC AUTO-ENTMCNC: 35.1 G/DL (ref 32–36)
MCV RBC AUTO: 99 FL (ref 80–100)
NRBC BLD-RTO: 0 /100 WBCS (ref 0–0)
PLATELET # BLD AUTO: 116 X10*3/UL (ref 150–450)
POTASSIUM SERPL-SCNC: 3.2 MMOL/L (ref 3.5–5.3)
PROTHROMBIN TIME: 14.7 SECONDS (ref 9.8–12.8)
RBC # BLD AUTO: 2.63 X10*6/UL (ref 4.5–5.9)
SODIUM SERPL-SCNC: 129 MMOL/L (ref 136–145)
WBC # BLD AUTO: 3.3 X10*3/UL (ref 4.4–11.3)

## 2024-09-19 PROCEDURE — 2500000001 HC RX 250 WO HCPCS SELF ADMINISTERED DRUGS (ALT 637 FOR MEDICARE OP): Performed by: NURSE PRACTITIONER

## 2024-09-19 PROCEDURE — 2500000001 HC RX 250 WO HCPCS SELF ADMINISTERED DRUGS (ALT 637 FOR MEDICARE OP): Performed by: INTERNAL MEDICINE

## 2024-09-19 PROCEDURE — 1200000002 HC GENERAL ROOM WITH TELEMETRY DAILY

## 2024-09-19 PROCEDURE — 2500000002 HC RX 250 W HCPCS SELF ADMINISTERED DRUGS (ALT 637 FOR MEDICARE OP, ALT 636 FOR OP/ED): Performed by: NURSE PRACTITIONER

## 2024-09-19 PROCEDURE — 85027 COMPLETE CBC AUTOMATED: CPT | Performed by: INTERNAL MEDICINE

## 2024-09-19 PROCEDURE — 85610 PROTHROMBIN TIME: CPT | Performed by: NURSE PRACTITIONER

## 2024-09-19 PROCEDURE — 2500000002 HC RX 250 W HCPCS SELF ADMINISTERED DRUGS (ALT 637 FOR MEDICARE OP, ALT 636 FOR OP/ED): Performed by: INTERNAL MEDICINE

## 2024-09-19 PROCEDURE — 72148 MRI LUMBAR SPINE W/O DYE: CPT

## 2024-09-19 PROCEDURE — 99233 SBSQ HOSP IP/OBS HIGH 50: CPT | Performed by: INTERNAL MEDICINE

## 2024-09-19 PROCEDURE — 80048 BASIC METABOLIC PNL TOTAL CA: CPT | Performed by: INTERNAL MEDICINE

## 2024-09-19 PROCEDURE — 99222 1ST HOSP IP/OBS MODERATE 55: CPT | Performed by: ORTHOPAEDIC SURGERY

## 2024-09-19 PROCEDURE — 72148 MRI LUMBAR SPINE W/O DYE: CPT | Performed by: RADIOLOGY

## 2024-09-19 PROCEDURE — 82947 ASSAY GLUCOSE BLOOD QUANT: CPT

## 2024-09-19 PROCEDURE — 97161 PT EVAL LOW COMPLEX 20 MIN: CPT | Mod: GP | Performed by: PHYSICAL THERAPIST

## 2024-09-19 PROCEDURE — 97116 GAIT TRAINING THERAPY: CPT | Mod: GP | Performed by: PHYSICAL THERAPIST

## 2024-09-19 PROCEDURE — 36415 COLL VENOUS BLD VENIPUNCTURE: CPT | Performed by: INTERNAL MEDICINE

## 2024-09-19 PROCEDURE — 2500000004 HC RX 250 GENERAL PHARMACY W/ HCPCS (ALT 636 FOR OP/ED): Performed by: NURSE PRACTITIONER

## 2024-09-19 RX ORDER — WARFARIN 2 MG/1
2 TABLET ORAL ONCE
Status: COMPLETED | OUTPATIENT
Start: 2024-09-19 | End: 2024-09-19

## 2024-09-19 RX ORDER — POTASSIUM CHLORIDE 20 MEQ/1
40 TABLET, EXTENDED RELEASE ORAL ONCE
Status: COMPLETED | OUTPATIENT
Start: 2024-09-19 | End: 2024-09-19

## 2024-09-19 ASSESSMENT — ENCOUNTER SYMPTOMS
CONSTIPATION: 0
HALLUCINATIONS: 0
FACIAL SWELLING: 0
NAUSEA: 0
PALPITATIONS: 0
FATIGUE: 0
DYSURIA: 0
COUGH: 0
NERVOUS/ANXIOUS: 0
CONFUSION: 0
ABDOMINAL PAIN: 0
LIGHT-HEADEDNESS: 0
JOINT SWELLING: 0
CHEST TIGHTNESS: 0
ARTHRALGIAS: 1
SHORTNESS OF BREATH: 0
AGITATION: 0
DIAPHORESIS: 0
NECK PAIN: 0
SORE THROAT: 0
WHEEZING: 0
FEVER: 0
CHILLS: 0
DIARRHEA: 0
HEMATURIA: 0
WOUND: 0
CHOKING: 0
VOMITING: 0
BACK PAIN: 0
WEAKNESS: 0

## 2024-09-19 ASSESSMENT — COGNITIVE AND FUNCTIONAL STATUS - GENERAL
WALKING IN HOSPITAL ROOM: A LITTLE
CLIMB 3 TO 5 STEPS WITH RAILING: A LITTLE
CLIMB 3 TO 5 STEPS WITH RAILING: A LITTLE
MOBILITY SCORE: 20
MOVING TO AND FROM BED TO CHAIR: A LITTLE
CLIMB 3 TO 5 STEPS WITH RAILING: A LITTLE
MOBILITY SCORE: 23
DAILY ACTIVITIY SCORE: 24
MOBILITY SCORE: 23
DAILY ACTIVITIY SCORE: 24
STANDING UP FROM CHAIR USING ARMS: A LITTLE

## 2024-09-19 ASSESSMENT — PAIN DESCRIPTION - DESCRIPTORS: DESCRIPTORS: BURNING

## 2024-09-19 ASSESSMENT — PAIN - FUNCTIONAL ASSESSMENT
PAIN_FUNCTIONAL_ASSESSMENT: 0-10
PAIN_FUNCTIONAL_ASSESSMENT: 0-10

## 2024-09-19 ASSESSMENT — PAIN SCALES - GENERAL
PAINLEVEL_OUTOF10: 2
PAINLEVEL_OUTOF10: 5 - MODERATE PAIN

## 2024-09-19 NOTE — CARE PLAN
The patient's goals for the shift include      The clinical goals for the shift include pt will be free from injury or falls this shift    Over the shift, the patient did not make progress toward the following goals. Barriers to progression include ***. Recommendations to address these barriers include ***.

## 2024-09-19 NOTE — PROGRESS NOTES
Physical Therapy    Physical Therapy Evaluation    Patient Name: Kiko Petersen  MRN: [id number]  Today's Date: 9/19/2024   Time Calculation  Start Time: 1040  Stop Time: 1120  Time Calculation (min): 40 min  2324/2324-A    Assessment/Plan   PT Assessment  PT Assessment Results: Decreased mobility, Pain, Orthopedic restrictions  Rehab Prognosis: Good  Evaluation/Treatment Tolerance: Patient tolerated treatment well  End of Session Communication: Care Coordinator  Assessment Comment: pt demos improved gait since adm, pt requested FWW for home use, completed gait training with both cane and FWW--advised pt to continue to use at least CANE until L hip pain gone, use FWW for longer distances until L hip improved to baseline. pt may continue walking in hospital with nursing staff/mobility aide supv.  End of Session Patient Position: Up in chair, Alarm on  IP OR SWING BED PT PLAN  Inpatient or Swing Bed: Inpatient  PT Plan  PT Plan: PT Eval only  PT Eval Only Reason: Safe to return home  PT Frequency: PT eval only  PT Discharge Recommendations: Low intensity level of continued care  Equipment Recommended upon Discharge: Wheeled walker (ISSUED TODAY 9/19/24)  PT Recommended Transfer Status: Stand by assist  PT - OK to Discharge: Yes    Subjective     Current Problem:  1. Hyponatremia        2. Left hip pain        3. Neuropathy of left lower extremity  Walker rolling    Referral to Home Health        General Visit Information:  General  Reason for Referral: adm for severe L hip pain  Referred By: TRES GIL. PT FOR IMPAIRED MOBILITY  Past Medical History Relevant to Rehab: L GERALD March 2023, DM, UTI, HTN. surgical history that includes Hernia repair (10/14/2015); Excision / repair hydrocele pediatric; and Cardiac electrophysiology procedure (3/12/2024).  Family/Caregiver Present: No  Patient Position Received: Bed, 3 rail up, Alarm on  General Comment: pt seen in 2324 immed following ORTHO CONSULT. pt very pleasant and  cooperative for PT. describes burning pain L hip but feels improved since adm.    Home Living:  Home Living  Type of Home: Apartment  Lives With: Alone  Home Adaptive Equipment: Cane (pt states he gave away FWW after hip surg March 2024)  Home Layout: One level  Home Access: Stairs to enter without rails  Entrance Stairs-Number of Steps: 1 (threshold)    Prior Level of Function:  Prior Function Per Pt/Caregiver Report  Level of [place]: Independent with ADLs and functional transfers, Independent with homemaking with ambulation  Ambulatory Assistance: Needs assistance  Transfers: Assistive device  Gait: Assistive device  Vocational: Part time employment ([employer] next to [employer] building)  Prior Function Comments: graduated off all AD until few days ago resumed use CANE after sudden onset L hip pain    Precautions:  Precautions  Medical Precautions: Fall precautions  Objective     Pain:  Pain Assessment  Pain Assessment: 0-10  0-10 (Numeric) Pain Score: 2  Pain Type: Acute pain  Pain Location: Hip  Pain Orientation: Left  Pain Descriptors: Burning  Pain Interventions: Ambulation/increased activity  Response to Interventions: pain did not increase    Cognition:  Cognition  Orientation Level: Oriented X4    General Assessments:  General Observation  General Observation: transfer OOB, practiced gait with cane then continued with FWW for gait into hallway. set up in chair to reduced bedrest   Activity Tolerance  Endurance: Tolerates 10 - 20 min exercise with multiple rests     Dynamic Sitting Balance  Dynamic Sitting-Comments: normal  Dynamic Standing Balance  Dynamic Standing-Comments: good:no overt LOB observed    Functional Assessments:     Bed Mobility 1  Bed Mobility 1: Supine to sitting  Level of Assistance 1: Modified independent  Transfer 1  Transfer From 1: Bed to  Transfer to 1: Chair with arms  Technique 1: Sit to stand  Transfer Device 1: Cane, Walker  Transfer Level of Assistance 1: Close  "supervision  Trials/Comments 1: practiced both cane and walker use  Ambulation/Gait Training 1  Device 1: Rolling walker, Single point cane  Assistance 1: Close supervision  Quality of Gait 1: Antalgic (mild LLE, has step-to gait (leads with LLE) which pt states is baseline \"I've been gimpy since the hip surgery\" (March 2023))  Comments/Distance (ft) 1: 100          Extremity/Trunk Assessments:        RLE   RLE : Within Functional Limits  LLE   LLE : Exceptions to WFL  AROM LLE (degrees)  LLE AROM Comment: WFL (stated L hip flexion only hurt \"when in bed\")  Strength LLE  LLE Overall Strength: Unable to assess, Due to pain, Greater than or equal to 3/5 as evidenced by functional mobility    Outcome Measures:     [facility] Basic Mobility  Turning from your back to your side while in a flat bed without using bedrails: None  Moving from lying on your back to sitting on the side of a flat bed without using bedrails: None  Moving to and from bed to chair (including a wheelchair): A little  Standing up from a chair using your arms (e.g. wheelchair or bedside chair): A little  To walk in hospital room: A little  Climbing 3-5 steps with railing: A little  Basic Mobility - Total Score: 20       Education Documentation  Mobility Training, taught by Bere Brewer, PT at 9/19/2024 12:26 PM.  Learner: Patient  Readiness: Acceptance  Method: Explanation, Demonstration  Response: Verbalizes Understanding, Demonstrated Understanding  Comment: use of walker vs cane for acute L hip pain    Education Comments  No comments found.         "

## 2024-09-19 NOTE — CARE PLAN
The patient's goals for the shift include  improve walking and maintain safety to find root cause of left leg issue  during his stay with us.    The clinical goals for the shift include Patient will remain safe throughout the shift.

## 2024-09-19 NOTE — PROGRESS NOTES
Kiko Petersen is a 64 y.o. male on day 1 of admission presenting with Hyponatremia.    Review of Systems   Constitutional:  Negative for chills, diaphoresis, fatigue and fever.   HENT:  Negative for congestion, facial swelling, sneezing and sore throat.    Respiratory:  Negative for cough, choking, chest tightness, shortness of breath and wheezing.    Cardiovascular:  Negative for chest pain, palpitations and leg swelling.   Gastrointestinal:  Negative for abdominal pain, constipation, diarrhea, nausea and vomiting.   Genitourinary:  Negative for dysuria, hematuria and urgency.   Musculoskeletal:  Positive for arthralgias. Negative for back pain, gait problem, joint swelling and neck pain.   Skin:  Negative for rash and wound.   Neurological:  Negative for syncope, weakness and light-headedness.   Psychiatric/Behavioral:  Negative for agitation, confusion and hallucinations. The patient is not nervous/anxious.    All other systems reviewed and are negative.     Subjective   Kiko Petersen is a 64 y.o. male with PMHx s/f prostate cancer, portal hypertension, atrial fibrillation status post ablation, anticoagulation therapy with warfarin, ulcerative colitis, hypertension, obstructive sleep apnea, history of left total hip arthroplasty in March 2023 at [facility] presenting with plaint of severe left hip pain.  The patient's symptoms started last Sunday while on way to watch a football game the patient had been driving in his car and developed left hip pain.  Patient had increased pain with walking patient had some easing of his pain on Monday seem to improve and even go away for a period of time then patient woke up suddenly this morning with a sharp pain and had difficulty walking to the bathroom.  Patient had tried taking some oxycodone at home but that was not really helpful with this pain so he come into the emergency department for evaluation.  Patient denies any recent fall or trauma he does not have  any fevers or chills he does not have any ongoing issues with severe diarrhea no nausea or vomiting.  Vital signs show temperature 97.2 heart rate 112 respiratory rate 18 blood pressure 142/90 SpO2 100% on room air.  Chemistry panel shows a glucose 150 sodium 123 chloride 98 bicarb 20 AST 66 total bilirubin 1.7 calcium 7.9 remainder of chemistry panel within normal limits.  CBC shows no leukocytosis there is anemia with hemoglobin 11.5 hematocrit 32.4 platelets 199 INR was measured at 4.  An x-ray of the left hip shows the patient to be status post left total hip arthroplasty with all hardware intact no acute displaced osseous or pelvic or left hip fractures.  A CT scan of the head was done by the ER provider due to some weakness in the left leg there were only age-related changes no acute intracranial process noted.  Patient was given Percocet was unable to get up and ambulate the ED provider wanted to admit the patient for further evaluation and management of his hyponatremia as well as his inability to walk due to severe left hip pain.     9/18: Patient seen.  X-ray of left hip without acute pathology.  Patient states she was advised not.  Wonders if this is related to a nerve issue.  Denies any pain in his back or recent back injury or fall.  In the past he has had improvement with muscle relaxants.  Will schedule some Flexeril.  Also complains of some loose stools related to his ulcerative colitis.  Continue sulfasalazine and will give 1 dose of Imodium.  Awaiting input from orthopedics.  Sodium is improved.       Objective     Last Recorded Vitals  /71 (BP Location: Left arm, Patient Position: Lying)   Pulse 76   Temp 36.2 °C (97.2 °F)   Resp 16   Wt 83 kg (183 lb)   SpO2 99%   Intake/Output last 3 Shifts:    Intake/Output Summary (Last 24 hours) at 9/19/2024 1016  Last data filed at 9/19/2024 0970  Gross per 24 hour   Intake 480 ml   Output --   Net 480 ml       Admission Weight  Weight: 83 kg (183  lb) (09/17/24 0746)    Daily Weight  09/17/24 : 83 kg (183 lb)      Physical Exam  Musculoskeletal:         General: Tenderness present.      Comments: Unable to extend hip or back          Lab Results  Results for orders placed or performed during the hospital encounter of 09/17/24 (from the past 24 hour(s))   POCT GLUCOSE   Result Value Ref Range    POCT Glucose 125 (H) 74 - 99 mg/dL   POCT GLUCOSE   Result Value Ref Range    POCT Glucose 152 (H) 74 - 99 mg/dL   POCT GLUCOSE   Result Value Ref Range    POCT Glucose 102 (H) 74 - 99 mg/dL   Protime-INR   Result Value Ref Range    Protime 14.7 (H) 9.8 - 12.8 seconds    INR 1.3 (H) 0.9 - 1.1   CBC   Result Value Ref Range    WBC 3.3 (L) 4.4 - 11.3 x10*3/uL    nRBC 0.0 0.0 - 0.0 /100 WBCs    RBC 2.63 (L) 4.50 - 5.90 x10*6/uL    Hemoglobin 9.1 (L) 13.5 - 17.5 g/dL    Hematocrit 25.9 (L) 41.0 - 52.0 %    MCV 99 80 - 100 fL    MCH 34.6 (H) 26.0 - 34.0 pg    MCHC 35.1 32.0 - 36.0 g/dL    RDW 14.5 11.5 - 14.5 %    Platelets 116 (L) 150 - 450 x10*3/uL   Basic Metabolic Panel   Result Value Ref Range    Glucose 104 (H) 74 - 99 mg/dL    Sodium 129 (L) 136 - 145 mmol/L    Potassium 3.2 (L) 3.5 - 5.3 mmol/L    Chloride 98 98 - 107 mmol/L    Bicarbonate 24 21 - 32 mmol/L    Anion Gap 10 10 - 20 mmol/L    Urea Nitrogen 10 6 - 23 mg/dL    Creatinine 0.63 0.50 - 1.30 mg/dL    eGFR >90 >60 mL/min/1.73m*2    Calcium 7.2 (L) 8.6 - 10.3 mg/dL   POCT GLUCOSE   Result Value Ref Range    POCT Glucose 117 (H) 74 - 99 mg/dL        Image Results  XR lumbar spine 2-3 views  Narrative: Interpreted By:  Minoo Ng,   STUDY:  XR LUMBAR SPINE 2-3 VIEWS; ;  9/18/2024 3:20 pm      INDICATION:  Signs/Symptoms:Left hip pain and numbness.          COMPARISON:  None.      ACCESSION NUMBER(S):  [id number]      ORDERING CLINICIAN:  JAIME SCHULER      FINDINGS:  AP, lateral and lumbosacral spot views were obtained. There are 5  lumbar type vertebra. Vertebral body heights are maintained. About  2-3 mm  retrolisthesis of L2 on L3 is noted. No significant  anterolisthesis is identified. L3-4 and L5-S1 disc heights are  moderately to severely narrowed. L4-5 disc is mildly narrowed. Facet  arthropathy is noted L3 through S1 and greater on the right than on  the left with a very slight levocurvature, apex at L2-3.      Endoprosthesis is noted in the left hip. Vascular calcification is  noted in the aorta with no aneurysmal dilatation.      Impression: Degenerative disc changes and facet arthropathy L3 through S1          MACRO:  None      Signed by: Minoo Ng 9/18/2024 3:53 PM  Dictation workstation:   [id number]       Assessment/Plan     Chronic hip pain after total replacement of left hip joint  Assessment & Plan  L hip pain [facility] 2023 [facility]essandra  Difficult exam, unable to extend hip, lie flat, no tenderness  Continue analgesics, PT for mobilization  Attempt to get ortho consult  9/18: No hip fracture seen on exam. Await ortho input.  Pain may be recurrent rather than chronic.  Has some numbness associated.  X-ray looked unremarkable.  Will check x-ray of his L-spine.  Adding Flexeril.  Reassess in AM.  9/19: L spine x-rays with degenerative changes and facet arthropathy.  Continues to have numbness and burning sensation in his quadriceps region on the left upper leg and hip.  States that this leg still feels heavy.  Does have some noticeable weakness on exam.  Will check an MRI for evaluation.  Despite this he is able to get up and ambulate with a cane.  Awaiting PT evaluation.  Awaiting input from orthopedics.  Consider referral to outpatient orthopedic spine pending results.    * Hyponatremia  Assessment & Plan  Pt somewhat chronic. Relates hx reduced sodium intake and increased fluid intake  Continue Slow hydration, hyponatremia workup and nephrology consult  9/18: Na improved at 127.  9/19: Na is 129.    Hypokalemia  Assessment & Plan  9/19: Low at 3.3. May be due to hydration. Replacing.    Hepatic cirrhosis  (Multi)  Assessment & Plan  Cirrhosis/Portal HTN/Familial  hemochromatosis  LFT downtrending  Not drinking alcohol  Does not seem overloaded volume avitia  Follows with Dr Patel, LFT trending down  Continue routine meds    Paroxysmal atrial fibrillation (Multi)  Assessment & Plan  Afib, hx afib ablation  Continue rate control, pharmacy to dose warfarin    Essential hypertension  Assessment & Plan  Continue home meds    Hereditary hemochromatosis (CMS-HCC)  Assessment & Plan  Causing cirrhosis    Type 2 diabetes mellitus without complication, without long-term current use of insulin (Multi)  Assessment & Plan  Only on metformin  Will hold, cover w sliding scale.   9/19: Glucose acceptable.                   Tye Sheets MD

## 2024-09-19 NOTE — PROGRESS NOTES
Occupational Therapy                 Therapy Communication Note    Patient Name: Kiko Petersen  MRN: [id number]  Department: [facility] 2 E  Room: 2324/2324-A  Today's Date: 9/19/2024     Discipline: Occupational Therapy    Missed Visit Reason: Missed Visit Reason: Patient refused    Missed Time: Attempt    Comment:

## 2024-09-19 NOTE — DISCHARGE INSTRUCTIONS
Follow up with PCP in 1-2 weeks. Call to schedule. Bring all your discharge paperwork and medications when you go to your follow up appointment. Return to ED if your symptoms come back.    MRI lumbar spine is pending at the time of discharge, however based on orthopedic consult this can be followed up as outpatient.  Patient to wear his seatbelt a little higher if possible.  Avoid large wallets in his pants pockets.    Follow-up with nephrology in 1 week.

## 2024-09-19 NOTE — CONSULTS
Reason For Consult   left hip pain    History Of Present Illness  Kiko Petersen is a 64 y.o. male presenting with  left leg pain, numbness, weakness.  Patient reports about 4 days ago he started developing the symptoms without injury.  He reports he had a left total hip done about 1 year ago by Dr. Root at [facility].  He reports he was doing well, had progressed to therapy, graduated from a walker and a cane.  However 4 days ago he started having to use the cane due to weakness and pain.  He reports numbness on the lateral and anterior aspect of his thigh.   He was on his way to watch a football game and was driving his car when he developed the hip pain.   reports no groin pain.  no bowel or bladder incontinence.     Past Medical History  He has a past medical history of Diabetes mellitus (Multi), Encounter for screening for malignant neoplasm of colon (07/30/2019), Hypertension, Unspecified cataract, and Urinary tract infection, site not specified (03/02/2020).    Surgical History  He has a past surgical history that includes Hernia repair (10/14/2015); Excision / repair hydrocele pediatric; Replacement total hip lateral position (Left); and Cardiac electrophysiology procedure (N/A, 3/12/2024).     Social History  He reports that he quit smoking about 44 years ago. His smoking use included cigarettes. He started smoking about 45 years ago. He has a 0.1 pack-year smoking history. He has never used smokeless tobacco. He reports current alcohol use. He reports that he does not use drugs.    Family History  Family History   Problem Relation Name Age of Onset    Hypertension Mother      Emphysema Mother      Lung cancer Father      Hypertension Father      Hypertension Brother      Heart attack Brother      Breast cancer Mother's Sister      Hypertension Mother's Sister      Emphysema Mother's Sister      Colon cancer Father's Sister      Hypertension Father's Sister      Hypertension Father's Brother       "Hypertension Maternal Grandmother      Hypertension Maternal Grandfather      Heart attack Maternal Grandfather          Allergies  Penicillins and Erythromycin    Review of Systems   Per H&P     Physical Exam   left lower extremity   sensation intact to light touch  distally but decreased to absent in the anterior and lateral aspects of the thigh   warm foot with brisk cap refill   5 out of 5 plantarflexion dorsiflexion EHL    negative straight leg raise   negative logroll, negative heel strike    Last Recorded Vitals  Blood pressure 129/71, pulse 76, temperature 36.2 °C (97.2 °F), resp. rate 16, height 1.778 m (5' 10\"), weight 83 kg (183 lb), SpO2 99%.    Relevant Results      Scheduled medications  cyclobenzaprine, 5 mg, oral, TID  influenza, 0.5 mL, intramuscular, During hospitalization  folic acid, 1 mg, oral, Daily  furosemide, 20 mg, oral, Daily  insulin lispro, 0-10 Units, subcutaneous, TID  lactobacillus acidophilus, 1 capsule, oral, Daily  lisinopril, 20 mg, oral, Daily  magnesium oxide, 400 mg, oral, Daily  metoprolol tartrate, 50 mg, oral, q12h  NIFEdipine ER, 30 mg, oral, Daily  polyethylene glycol, 17 g, oral, Daily  sulfaSALAzine, 1,000 mg, oral, TID  tamsulosin, 0.8 mg, oral, Daily  warfarin, 2 mg, oral, Once      Continuous medications  sodium chloride 0.9%, 75 mL/hr, Last Rate: 75 mL/hr (09/18/24 1249)      PRN medications  PRN medications: bisacodyl, bisacodyl, dextrose, dextrose, glucagon, glucagon, guaiFENesin, melatonin, morphine, morphine, ondansetron **OR** ondansetron  Results for orders placed or performed during the hospital encounter of 09/17/24 (from the past 24 hour(s))   POCT GLUCOSE   Result Value Ref Range    POCT Glucose 125 (H) 74 - 99 mg/dL   POCT GLUCOSE   Result Value Ref Range    POCT Glucose 152 (H) 74 - 99 mg/dL   POCT GLUCOSE   Result Value Ref Range    POCT Glucose 102 (H) 74 - 99 mg/dL   Protime-INR   Result Value Ref Range    Protime 14.7 (H) 9.8 - 12.8 seconds    INR 1.3 " (H) 0.9 - 1.1   CBC   Result Value Ref Range    WBC 3.3 (L) 4.4 - 11.3 x10*3/uL    nRBC 0.0 0.0 - 0.0 /100 WBCs    RBC 2.63 (L) 4.50 - 5.90 x10*6/uL    Hemoglobin 9.1 (L) 13.5 - 17.5 g/dL    Hematocrit 25.9 (L) 41.0 - 52.0 %    MCV 99 80 - 100 fL    MCH 34.6 (H) 26.0 - 34.0 pg    MCHC 35.1 32.0 - 36.0 g/dL    RDW 14.5 11.5 - 14.5 %    Platelets 116 (L) 150 - 450 x10*3/uL   Basic Metabolic Panel   Result Value Ref Range    Glucose 104 (H) 74 - 99 mg/dL    Sodium 129 (L) 136 - 145 mmol/L    Potassium 3.2 (L) 3.5 - 5.3 mmol/L    Chloride 98 98 - 107 mmol/L    Bicarbonate 24 21 - 32 mmol/L    Anion Gap 10 10 - 20 mmol/L    Urea Nitrogen 10 6 - 23 mg/dL    Creatinine 0.63 0.50 - 1.30 mg/dL    eGFR >90 >60 mL/min/1.73m*2    Calcium 7.2 (L) 8.6 - 10.3 mg/dL   POCT GLUCOSE   Result Value Ref Range    POCT Glucose 117 (H) 74 - 99 mg/dL        x-ray of his left hip shows stable total hip arthroplasty prosthesis without signs of loosening or fracture, degenerative changes to his lumbar spine    X-rays were personally reviewed by me. Radiology reports were reviewed by me as well, if available at the time.       Assessment/Plan   Meralgia Paresthetica, left  Based on the history, physical exam and imaging studies above, the patient's presentation is consistent with the above diagnosis.  I had a long discussion with the patient regarding their presentation and the treatment options.  We discussed initial nonoperative versus operative management options as well as potential further diagnostic imaging.  We again discussed her treatment options going forward along with their associated risks and benefits. After thorough discussion, the patient has elected to proceed with conservative management. All questions were answered to the patients satisfaction who seems satisfied with the plan.  They will call the office with any questions/concerns.     meralgia paresthetica is irritation of the lateral femoral cutaneous nerve which is often  caused by either a tight belt or seatbelt which seems to be in line with his history and his exam   analgesics, physical therapy, weight reduction, and decreased in belt and seatbelt use around that area can help   follow-up in 4 weeks as needed, stable for discharge from orthopedic perspective, no surgical interventions planned at this time    Saurabh Moore DO

## 2024-09-19 NOTE — PROGRESS NOTES
Pharmacy Consult for Warfarin (Coumadin) Management - Daily Progress Note     Current home dose of warfarin 1.25mg daily per 9/11 note, INR was 3 on that day; note said pt's appetite usually decreases this time of year d/t allergies.  INR dropped from   2 yesterday to 1.3 today, but was held prior to that. He was given his home dose of 1.25mg yesterday, will do slight boost to 2mg x1 today.    Labs  POC INR   Date Value Ref Range Status   09/11/2024 3.00 2.00 - 3.00 Final   09/04/2024 3.20 (A) 2.00 - 3.00 Final   08/28/2024 1.70 (A) 2.00 - 3.00 Corrected     INR   Date Value Ref Range Status   09/19/2024 1.3 (H) 0.9 - 1.1 Final   09/18/2024 2.0 (H) 0.9 - 1.1 Final   09/17/2024 4.0 (H) 0.9 - 1.1 Final     Review  Warfarin Indication: Atrial fibrillation or flutter  Target INR: 2 - 3       Callum Madrigal, PharmD

## 2024-09-19 NOTE — PROGRESS NOTES
"      Nephrology Progress Note      Nephrology following for hyponatremia.   Events over night:   Patient is feeling better  Pain is better controlled  /62 (BP Location: Left arm)   Pulse 68   Temp 36.3 °C (97.3 °F)   Resp 16   Ht 1.778 m (5' 10\")   Wt 83 kg (183 lb)   SpO2 99%   BMI 26.26 kg/m²     Input / Output:  24 HR:   Intake/Output Summary (Last 24 hours) at 9/19/2024 1718  Last data filed at 9/19/2024 1315  Gross per 24 hour   Intake 480 ml   Output --   Net 480 ml       Physical Exam   Alert and oriented x 3 NAD  Neck: no JVD  CV: RRR  Lungs: CTA bilaterally  Abd: soft, NT, ND   Ext: no lower extremity edema    Scheduled medications  cyclobenzaprine, 5 mg, oral, TID  influenza, 0.5 mL, intramuscular, During hospitalization  folic acid, 1 mg, oral, Daily  furosemide, 20 mg, oral, Daily  insulin lispro, 0-10 Units, subcutaneous, TID  lactobacillus acidophilus, 1 capsule, oral, Daily  lisinopril, 20 mg, oral, Daily  magnesium oxide, 400 mg, oral, Daily  metoprolol tartrate, 50 mg, oral, q12h  NIFEdipine ER, 30 mg, oral, Daily  polyethylene glycol, 17 g, oral, Daily  sulfaSALAzine, 1,000 mg, oral, TID  tamsulosin, 0.8 mg, oral, Daily  warfarin, 2 mg, oral, Once      Continuous medications  sodium chloride 0.9%, 75 mL/hr, Last Rate: 75 mL/hr (09/18/24 1249)      PRN medications  PRN medications: bisacodyl, bisacodyl, dextrose, dextrose, glucagon, glucagon, guaiFENesin, melatonin, morphine, morphine, ondansetron **OR** ondansetron   Results from last 7 days   Lab Units 09/19/24  0453 09/17/24  1556 09/17/24  0802   SODIUM mmol/L 129*   < > 123*   POTASSIUM mmol/L 3.2*   < > 4.1   CHLORIDE mmol/L 98   < > 90*   CO2 mmol/L 24   < > 20*   BUN mg/dL 10   < > 9   CREATININE mg/dL 0.63   < > 0.73   CALCIUM mg/dL 7.2*   < > 7.9*   PROTEIN TOTAL g/dL  --   --  6.6   BILIRUBIN TOTAL mg/dL  --   --  1.7*   ALK PHOS U/L  --   --  98   ALT U/L  --   --  37   AST U/L  --   --  66*   GLUCOSE mg/dL 104*   < > 150* "    < > = values in this interval not displayed.           Results from last 7 days   Lab Units 09/19/24  0453 09/18/24  0421 09/17/24  0802   WBC AUTO x10*3/uL 3.3* 5.6 5.9   HEMOGLOBIN g/dL 9.1* 10.2* 11.5*   HEMATOCRIT % 25.9* 27.5* 32.4*   PLATELETS AUTO x10*3/uL 116* 174 199        Assessment & Plan:   Patient is 64 y.o. male who is admitted to hospital for left hip pain and hyponatremia. Nephrology consulted in view of hyponatremia.     Hyponatremia  -Acute on chronic issue in the setting of cirrhosis   -patient has not been on diuretics  -He is actually had poor p.o. intake due to nausea   -sodium level in 1 week ago was 125  -Sodium at presentation now is 123  -Could be hypovolemic but may also have ADH increased from acute pain and nausea.  -Improving with volume expansion so far, receiving p.o. Lasix simultaneously        Cirrhosis-hemochromatosis, history of EtOH dependence  Hypertension-accelerated due to pain           Recommendations:   -Serum sodium is close to goal now  -Stop IV fluids  -Okay to continue Lasix p.o prn  -Okay for discharge from renal view    Please message me through EPIC chat with any questions or concerns.     Kim Hilliard DO  9/19/2024  5:18 PM     [facility]    [address], [address]   [place] [address]  Office: [phone]

## 2024-09-20 ENCOUNTER — DOCUMENTATION (OUTPATIENT)
Dept: HOME HEALTH SERVICES | Facility: HOME HEALTH | Age: 64
End: 2024-09-20
Payer: MEDICARE

## 2024-09-20 VITALS
RESPIRATION RATE: 16 BRPM | WEIGHT: 183 LBS | OXYGEN SATURATION: 100 % | TEMPERATURE: 97.7 F | SYSTOLIC BLOOD PRESSURE: 152 MMHG | DIASTOLIC BLOOD PRESSURE: 82 MMHG | HEIGHT: 70 IN | HEART RATE: 77 BPM | BODY MASS INDEX: 26.2 KG/M2

## 2024-09-20 PROBLEM — G57.12 MERALGIA PARAESTHETICA, LEFT: Status: ACTIVE | Noted: 2023-10-30

## 2024-09-20 LAB
GLUCOSE BLD MANUAL STRIP-MCNC: 120 MG/DL (ref 74–99)
GLUCOSE BLD MANUAL STRIP-MCNC: 122 MG/DL (ref 74–99)
INR PPP: 1.1 (ref 0.9–1.1)
PROTHROMBIN TIME: 12.9 SECONDS (ref 9.8–12.8)

## 2024-09-20 PROCEDURE — 99239 HOSP IP/OBS DSCHRG MGMT >30: CPT | Performed by: INTERNAL MEDICINE

## 2024-09-20 PROCEDURE — 97530 THERAPEUTIC ACTIVITIES: CPT | Mod: GO,CO

## 2024-09-20 PROCEDURE — 2500000001 HC RX 250 WO HCPCS SELF ADMINISTERED DRUGS (ALT 637 FOR MEDICARE OP): Performed by: NURSE PRACTITIONER

## 2024-09-20 PROCEDURE — 2500000004 HC RX 250 GENERAL PHARMACY W/ HCPCS (ALT 636 FOR OP/ED): Performed by: NURSE PRACTITIONER

## 2024-09-20 PROCEDURE — 85610 PROTHROMBIN TIME: CPT | Performed by: NURSE PRACTITIONER

## 2024-09-20 PROCEDURE — 36415 COLL VENOUS BLD VENIPUNCTURE: CPT | Performed by: NURSE PRACTITIONER

## 2024-09-20 PROCEDURE — 2500000001 HC RX 250 WO HCPCS SELF ADMINISTERED DRUGS (ALT 637 FOR MEDICARE OP): Performed by: INTERNAL MEDICINE

## 2024-09-20 PROCEDURE — 2500000002 HC RX 250 W HCPCS SELF ADMINISTERED DRUGS (ALT 637 FOR MEDICARE OP, ALT 636 FOR OP/ED): Performed by: NURSE PRACTITIONER

## 2024-09-20 PROCEDURE — 82947 ASSAY GLUCOSE BLOOD QUANT: CPT

## 2024-09-20 RX ORDER — WARFARIN 2 MG/1
2 TABLET ORAL ONCE
Status: DISCONTINUED | OUTPATIENT
Start: 2024-09-20 | End: 2024-09-20

## 2024-09-20 ASSESSMENT — COGNITIVE AND FUNCTIONAL STATUS - GENERAL
DRESSING REGULAR UPPER BODY CLOTHING: A LITTLE
CLIMB 3 TO 5 STEPS WITH RAILING: A LITTLE
MOBILITY SCORE: 23
DRESSING REGULAR LOWER BODY CLOTHING: A LITTLE
HELP NEEDED FOR BATHING: A LITTLE
DAILY ACTIVITIY SCORE: 23
PERSONAL GROOMING: A LITTLE
TOILETING: A LITTLE
DAILY ACTIVITIY SCORE: 17
EATING MEALS: A LITTLE
DRESSING REGULAR LOWER BODY CLOTHING: A LOT

## 2024-09-20 ASSESSMENT — PAIN SCALES - GENERAL
PAINLEVEL_OUTOF10: 0 - NO PAIN
PAINLEVEL_OUTOF10: 0 - NO PAIN

## 2024-09-20 ASSESSMENT — PAIN - FUNCTIONAL ASSESSMENT
PAIN_FUNCTIONAL_ASSESSMENT: 0-10
PAIN_FUNCTIONAL_ASSESSMENT: 0-10

## 2024-09-20 NOTE — HH CARE COORDINATION
Home Care received a Referral for Physical Therapy. We have processed the referral for a Start of Care on 9.21.24 TO 9.23.24.     If you have any questions or concerns, please feel free to contact us at [phone]. Follow the prompts, enter your five digit zip code, and you will be directed to your care team on E[facility] 3.

## 2024-09-20 NOTE — DISCHARGE SUMMARY
Discharge Diagnosis  Meralgia paraesthetica, left  Assessment & Plan  L hip pain [facility] 2023 Alessandra  Difficult exam, unable to extend hip, lie flat, no tenderness  Continue analgesics, PT for mobilization  Attempt to get ortho consult  9/18: No hip fracture seen on exam. Await ortho input.  Pain may be recurrent rather than chronic.  Has some numbness associated.  X-ray looked unremarkable.  Will check x-ray of his L-spine.  Adding Flexeril.  Reassess in AM.  9/19: L spine x-rays with degenerative changes and facet arthropathy.  Continues to have numbness and burning sensation in his quadriceps region on the left upper leg and hip.  States that this leg still feels heavy.  Does have some noticeable weakness on exam.  Will check an MRI for evaluation.  Despite this he is able to get up and ambulate with a cane.  Awaiting PT evaluation.  Awaiting input from orthopedics.  Consider referral to outpatient orthopedic spine pending results.  9/20: Appreciate input from orthopedic surgery.  Recommend patient wear his seatbelt a little higher.  Avoid large wallets.  Follow-up with PCP and/or orthopedics as outpatient.  Wheeled walker on discharge.  Okay to discharge to home.  Home PT ordered.    * Hyponatremia  Assessment & Plan  Pt somewhat chronic. Relates hx reduced sodium intake and increased fluid intake  Continue Slow hydration, hyponatremia workup and nephrology consult  9/18: Na improved at 127.  9/19: Na is 129.  9/20: Follow-up with nephrology as outpatient.  Hyponatremia is likely secondary to occasional Lasix and pain.    Hypokalemia  Assessment & Plan  9/19: Low at 3.3. May be due to hydration. Replacing.    Hepatic cirrhosis (Multi)  Assessment & Plan  Cirrhosis/Portal HTN/Familial  hemochromatosis  LFT downtrending  Not drinking alcohol  Does not seem overloaded volume avitia  Follows with Dr Patel, LFT trending down  Continue routine meds    Paroxysmal atrial fibrillation (Multi)  Assessment & Plan  Afib, hx afib  ablation  Continue rate control, pharmacy to dose warfarin    Essential hypertension  Assessment & Plan  Continue home meds    Hereditary hemochromatosis (CMS-HCC)  Assessment & Plan  Causing cirrhosis    Type 2 diabetes mellitus without complication, without long-term current use of insulin (Multi)  Assessment & Plan  Only on metformin  Will hold, cover w sliding scale.   9/19: Glucose acceptable.          Issues Requiring Follow-Up  Follow-up PCP in 1 to 2 weeks.  MRI lumbar spine is currently pending results but felt to be less important based on orthopedic and follow-up with PCP.  Follow-up with nephrology as outpatient.    Discharge Meds     Medication List      CHANGE how you take these medications     warfarin 2.5 mg tablet; Commonly known as: Coumadin; Take as directed.   If you are unsure how to take this medication, talk to your nurse or   doctor.; Original instructions: Take 1.25mg (1/2 tablet ) by mouth   Tuesdays and Thursdays and 2.5mg (1 tablet) by mouth all other days or as   directed by Coumadin clinic; What changed: how much to take, how to take   this, when to take this, additional instructions     CONTINUE taking these medications     ketoconazole 2 % cream; Commonly known as: NIZOral   Lasix 40 mg tablet; Generic drug: furosemide; Take 0.5 tablets (20 mg)   by mouth once daily.   lisinopril 20 mg tablet; Take 1 tablet (20 mg) by mouth once daily.   metFORMIN 500 mg tablet; Commonly known as: Glucophage; TAKE 1 TABLET BY   MOUTH TWO TIMES A DAY WITH MEALS   metoprolol tartrate 50 mg tablet; Commonly known as: Lopressor; TAKE 1   TABLET BY MOUTH EVERY 12 HOURS   NIFEdipine ER 30 mg 24 hr tablet; Commonly known as: Adalat CC; Take 1   tablet (30 mg) by mouth once daily.   sulfaSALAzine 500 mg tablet; Commonly known as: Azulfidine; TAKE 2   TABLETS BY MOUTH THREE TIMES A DAY   tadalafil 5 mg tablet; Commonly known as: Cialis; TAKE 1 TABLET BY MOUTH   EVERY DAY   tamsulosin 0.4 mg 24 hr capsule;  Commonly known as: Flomax; Take 2   capsules (0.8 mg) by mouth once daily.     ASK your doctor about these medications     folic acid 1 mg tablet; Commonly known as: Folvite; TAKE 1 TABLET BY   MOUTH ONCE DAILY       Test Results Pending At Discharge  Pending Labs       No current pending labs.            Hospital Course   Kiko Petersen is a 64 y.o. male with PMHx s/f prostate cancer, portal hypertension, atrial fibrillation status post ablation, anticoagulation therapy with warfarin, ulcerative colitis, hypertension, obstructive sleep apnea, history of left total hip arthroplasty in March 2023 at [facility] presenting with plaint of severe left hip pain.  The patient's symptoms started last Sunday while on way to watch a football game the patient had been driving in his car and developed left hip pain.  Patient had increased pain with walking patient had some easing of his pain on Monday seem to improve and even go away for a period of time then patient woke up suddenly this morning with a sharp pain and had difficulty walking to the bathroom.  Patient had tried taking some oxycodone at home but that was not really helpful with this pain so he come into the emergency department for evaluation.  Patient denies any recent fall or trauma he does not have any fevers or chills he does not have any ongoing issues with severe diarrhea no nausea or vomiting.  Vital signs show temperature 97.2 heart rate 112 respiratory rate 18 blood pressure 142/90 SpO2 100% on room air.  Chemistry panel shows a glucose 150 sodium 123 chloride 98 bicarb 20 AST 66 total bilirubin 1.7 calcium 7.9 remainder of chemistry panel within normal limits.  CBC shows no leukocytosis there is anemia with hemoglobin 11.5 hematocrit 32.4 platelets 199 INR was measured at 4.  An x-ray of the left hip shows the patient to be status post left total hip arthroplasty with all hardware intact no acute displaced osseous or pelvic or left hip  fractures.  A CT scan of the head was done by the ER provider due to some weakness in the left leg there were only age-related changes no acute intracranial process noted.  Patient was given Percocet was unable to get up and ambulate the ED provider wanted to admit the patient for further evaluation and management of his hyponatremia as well as his inability to walk due to severe left hip pain.      9/18: Patient seen.  X-ray of left hip without acute pathology.  Patient states she was advised not.  Wonders if this is related to a nerve issue.  Denies any pain in his back or recent back injury or fall.  In the past he has had improvement with muscle relaxants.  Will schedule some Flexeril.  Also complains of some loose stools related to his ulcerative colitis.  Continue sulfasalazine and will give 1 dose of Imodium.  Awaiting input from orthopedics.  Sodium is improved.    9/18: MRI lumbar spine ordered.  Results not available.  Consult placed to orthopedics.  Suspects has meralgia paresthesias, can be monitored as outpatient.  MRI of lumbar spine is therefore less important.  Seen by PT recommends home PT.  Will order for wheeled walker.    9/20: Okay to discharge to home.  Will follow-up with home health care.  Follow-up with PCP and nephrology.  May follow-up with his original orthopedic surgeon if needed.  May wear seatbelt a little higher.  Avoid large wallets in his pockets.  Okay to discharge to home.    This discharge took greater than 35 minutes to arrange.    Pertinent Physical Exam At Time of Discharge  Physical Exam  Constitutional:       General: He is not in acute distress.  HENT:      Head: Normocephalic and atraumatic.      Nose: Nose normal. No congestion or rhinorrhea.      Mouth/Throat:      Mouth: Mucous membranes are dry.      Pharynx: Oropharynx is clear.   Eyes:      General: No scleral icterus.     Extraocular Movements: Extraocular movements intact.      Pupils: Pupils are equal, round, and  reactive to light.   Cardiovascular:      Rate and Rhythm: Normal rate and regular rhythm.      Heart sounds: Normal heart sounds. No murmur heard.     No friction rub. No gallop.   Pulmonary:      Effort: Pulmonary effort is normal.      Breath sounds: Normal breath sounds. No wheezing, rhonchi or rales.   Chest:      Chest wall: No tenderness.   Abdominal:      General: There is no distension.      Palpations: Abdomen is soft.      Tenderness: There is no abdominal tenderness. There is no guarding or rebound.   Musculoskeletal:         General: No swelling, tenderness or signs of injury. Normal range of motion.      Cervical back: Normal range of motion.   Skin:     General: Skin is warm and dry.      Coloration: Skin is not jaundiced.      Findings: No bruising, erythema or rash.   Neurological:      General: No focal deficit present.      Mental Status: He is alert and oriented to person, place, and time.         Outpatient Follow-Up  Future Appointments   Date Time Provider Department Center   9/25/2024  9:40 AM Humphrey Leal MD [id number] [facility]   10/18/2024  9:30 AM Tye Tran MD [id number] [facility]   10/23/2024  1:00 PM Brittany Montelongo APRN-CNP [id number] [facility]   12/12/2024  1:20 PM Paolo Rogers MD [id number] [facility]   3/19/2025 10:15 AM Yuliana Reid MD [id number] [facility]         Tye Sheets MD

## 2024-09-20 NOTE — PROGRESS NOTES
"      Nephrology Progress Note      Nephrology following for hyponatremia.   Events over night: none   \  Patient is feeling better. Less pains and BP better.   Am labs still pending but Na was 129 yesterday    /77 (BP Location: Left arm, Patient Position: Sitting)   Pulse 80   Temp 36.4 °C (97.6 °F) (Temporal)   Resp 16   Ht 1.778 m (5' 10\")   Wt 83 kg (183 lb)   SpO2 98%   BMI 26.26 kg/m²     Input / Output:  24 HR:   Intake/Output Summary (Last 24 hours) at 9/20/2024 0915  Last data filed at 9/19/2024 1315  Gross per 24 hour   Intake 480 ml   Output --   Net 480 ml       Physical Exam   Alert and oriented x 3 NAD  Neck: no JVD  CV: RRR  Lungs: CTA bilaterally  Abd: soft, NT, ND   Ext: no lower extremity edema    Scheduled medications  cyclobenzaprine, 5 mg, oral, TID  influenza, 0.5 mL, intramuscular, During hospitalization  folic acid, 1 mg, oral, Daily  furosemide, 20 mg, oral, Daily  insulin lispro, 0-10 Units, subcutaneous, TID  lactobacillus acidophilus, 1 capsule, oral, Daily  lisinopril, 20 mg, oral, Daily  magnesium oxide, 400 mg, oral, Daily  metoprolol tartrate, 50 mg, oral, q12h  NIFEdipine ER, 30 mg, oral, Daily  polyethylene glycol, 17 g, oral, Daily  sulfaSALAzine, 1,000 mg, oral, TID  tamsulosin, 0.8 mg, oral, Daily      Continuous medications       PRN medications  PRN medications: bisacodyl, bisacodyl, dextrose, dextrose, glucagon, glucagon, guaiFENesin, melatonin, morphine, morphine, ondansetron **OR** ondansetron   Results from last 7 days   Lab Units 09/19/24  0453 09/17/24  1556 09/17/24  0802   SODIUM mmol/L 129*   < > 123*   POTASSIUM mmol/L 3.2*   < > 4.1   CHLORIDE mmol/L 98   < > 90*   CO2 mmol/L 24   < > 20*   BUN mg/dL 10   < > 9   CREATININE mg/dL 0.63   < > 0.73   CALCIUM mg/dL 7.2*   < > 7.9*   PROTEIN TOTAL g/dL  --   --  6.6   BILIRUBIN TOTAL mg/dL  --   --  1.7*   ALK PHOS U/L  --   --  98   ALT U/L  --   --  37   AST U/L  --   --  66*   GLUCOSE mg/dL 104*   < > 150* "    < > = values in this interval not displayed.           Results from last 7 days   Lab Units 09/19/24  0453 09/18/24  0421 09/17/24  0802   WBC AUTO x10*3/uL 3.3* 5.6 5.9   HEMOGLOBIN g/dL 9.1* 10.2* 11.5*   HEMATOCRIT % 25.9* 27.5* 32.4*   PLATELETS AUTO x10*3/uL 116* 174 199        Assessment & Plan:   Patient is 64 y.o. male who is admitted to hospital for left hip pain and hyponatremia. Nephrology consulted in view of hyponatremia.     Hyponatremia  -Acute on chronic issue in the setting of cirrhosis   -patient has not been on diuretics  -He is actually had poor p.o. intake due to nausea   -sodium level in 1 week ago was 125  -Sodium at presentation now is 123  -Could be hypovolemic but may also have ADH increased from acute pain and nausea.  -Improving with volume expansion so far, receiving p.o. Lasix simultaneously        Cirrhosis-hemochromatosis, history of EtOH dependence  Hypertension-improved with improved pain control           Recommendations:   -Serum sodium has been close to goal now-await AM LAB  -Okay to continue Lasix p.o prn  -Okay for discharge from renal view    Please message me through EPIC chat with any questions or concerns.     Julien Matamoros PA-C  9/20/2024  9:15 AM     [facility]    [address], [address]   [place] [address]  Office: [phone]

## 2024-09-20 NOTE — NURSING NOTE
Discharge instructions reviewed with patient, verbalized understanding. Iv and tele removed. No questions at this time.

## 2024-09-20 NOTE — PROGRESS NOTES
Occupational Therapy    OT Treatment    Patient Name: Kiko Petersen  MRN: [id number]  Department: [facility]  Room: [address] [place] [address]  Today's Date: 9/20/2024  Time Calculation  Start Time: 0936  Stop Time: 0946  Time Calculation (min): 10 min        Assessment:  End of Session Communication: Bedside nurse  End of Session Patient Position: Up in chair, Alarm off, not on at start of session  OT Assessment Results: Decreased ADL status, Decreased upper extremity strength, Decreased endurance, Decreased functional mobility, Decreased IADLs  Plan:  Treatment Interventions: ADL retraining, Functional transfer training, UE strengthening/ROM, Endurance training  OT Frequency: 3 times per week  OT Discharge Recommendations: Moderate intensity level of continued care  Equipment Recommended upon Discharge:  (TBD)  OT Recommended Transfer Status: Minimal assist, Assist of 1  OT - OK to Discharge: Yes (ok to dc to next level of care once medically stable)  Treatment Interventions: ADL retraining, Functional transfer training, UE strengthening/ROM, Endurance training    Subjective   Previous Visit Info:  OT Last Visit  OT Received On: 09/20/24  General:  General  Patient Position Received: Alarm off, not on at start of session, Up in chair  General Comment: pt agreeable to OT tx session at this time  Precautions:  Medical Precautions: Fall precautions    Vital Signs (Past 2hrs)        Date/Time Vitals Session Patient Position Pulse Resp SpO2 BP MAP (mmHg)    09/20/24 0900 --  --  80  --  --  123/77  --     09/20/24 0948 --  --  72  16  100 %  138/84  --                         Pain:  Pain Assessment  Pain Assessment: 0-10  0-10 (Numeric) Pain Score: 0 - No pain    Objective    Cognition:  Cognition  Overall Cognitive Status: Within Functional Limits  Orientation Level: Oriented X4    Functional Standing Tolerance:  Time: 2-3 min standing bouts  Activity: pt tolerated standing well with FWW and close supv no LOB  Bed Mobility/Transfers:  Transfers  Transfer: Yes  Transfer 1  Transfer From 1: Chair with arms to  Transfer to 1: Stand, Sit  Technique 1: Sit to stand, Stand to sit  Transfer Device 1: Walker  Transfer Level of Assistance 1: Close supervision      Functional Mobility:  Functional Mobility  Functional Mobility Performed: Yes  Functional Mobility 1  Comments 1: pt achieved over household distance of functional mobility with close supv and FWW, no LOB noted. x1 30 second standing RB  Sitting Balance:  Static Sitting Balance  Static Sitting-Balance Support: Feet supported, Bilateral upper extremity supported  Static Sitting-Level of Assistance: Independent  Dynamic Sitting Balance  Dynamic Sitting-Balance Support: Feet supported, No upper extremity supported  Dynamic Sitting-Level of Assistance: Independent  Standing Balance:  Static Standing Balance  Static Standing-Balance Support: Bilateral upper extremity supported  Static Standing-Level of Assistance: Close supervision  Dynamic Standing Balance  Dynamic Standing-Balance Support: No upper extremity supported  Dynamic Standing-Level of Assistance: Contact guard      Outcome Measures:[facility] Daily Activity  Putting on and taking off regular lower body clothing: A lot  Bathing (including washing, rinsing, drying): A little  Putting on and taking off regular upper body clothing: A little  Toileting, which includes using toilet, bedpan or urinal: A little  Taking care of personal grooming such as brushing teeth: A little  Eating Meals: A little  Daily Activity - Total Score: 17        Education Documentation  Handouts, taught by JOHNNY Negrete at 9/20/2024  9:57 AM.  Learner: Patient  Readiness: Acceptance  Method: Explanation  Response: Verbalizes Understanding    Body Mechanics, taught by JOHNNY Negrete at 9/20/2024  9:57 AM.  Learner: Patient  Readiness: Acceptance  Method: Explanation  Response: Verbalizes Understanding    Precautions, taught by JOHNNY Negrete at 9/20/2024  9:57  AM.  Learner: Patient  Readiness: Acceptance  Method: Explanation  Response: Verbalizes Understanding    Home Exercise Program, taught by JOHNNY Negrete at 9/20/2024  9:57 AM.  Learner: Patient  Readiness: Acceptance  Method: Explanation  Response: Verbalizes Understanding    ADL Training, taught by JOHNNY Negrete at 9/20/2024  9:57 AM.  Learner: Patient  Readiness: Acceptance  Method: Explanation  Response: Verbalizes Understanding    Education Comments  No comments found.        OP EDUCATION:       Goals:  Encounter Problems       Encounter Problems (Active)       ADLs       Patient with complete lower body dressing with stand by assist level of assistance donning and doffing all LE clothes  with PRN adaptive equipment while edge of bed  and standing (Progressing)       Start:  09/18/24    Expected End:  09/25/24               MOBILITY       Patient will perform Functional mobility min Household distances/Community Distances with stand by assist level of assistance and least restrictive device in order to improve safety and functional mobility. (Progressing)       Start:  09/18/24    Expected End:  09/25/24               TRANSFERS       Patient will complete functional transfer to chair with least restrictive device with stand by assist level of assistance. (Progressing)       Start:  09/18/24    Expected End:  09/25/24

## 2024-09-23 ENCOUNTER — PATIENT OUTREACH (OUTPATIENT)
Dept: PRIMARY CARE | Facility: CLINIC | Age: 64
End: 2024-09-23
Payer: MEDICARE

## 2024-09-23 NOTE — PROGRESS NOTES
Discharge Facility:[facility]  Discharge Diagnosis:Hyponatremia  Admission Date:09/17/24  Discharge Date: 09/20/24    PCP Appointment Date:one made sent to pcp office  Specialist Appointment Date:   Hospital Encounter and Summary Linked: Yes  See discharge assessment below for further details  Two attempts were made to reach patient within two business days after discharge. Voicemail left with contact information for patient to call back with any non-emergent questions or concerns.

## 2024-09-24 ENCOUNTER — TELEPHONE (OUTPATIENT)
Dept: UROLOGY | Facility: HOSPITAL | Age: 64
End: 2024-09-24
Payer: MEDICARE

## 2024-09-24 NOTE — TELEPHONE ENCOUNTER
Attempted to call patient to schedule prostate biopsy with Dr. Lyle. M to call Urology to schedule.

## 2024-09-25 ENCOUNTER — ANTICOAGULATION - WARFARIN VISIT (OUTPATIENT)
Dept: PHARMACY | Facility: HOSPITAL | Age: 64
End: 2024-09-25
Payer: MEDICARE

## 2024-09-25 ENCOUNTER — OFFICE VISIT (OUTPATIENT)
Dept: GASTROENTEROLOGY | Facility: HOSPITAL | Age: 64
End: 2024-09-25
Payer: MEDICARE

## 2024-09-25 VITALS
WEIGHT: 195.5 LBS | OXYGEN SATURATION: 98 % | HEART RATE: 80 BPM | RESPIRATION RATE: 20 BRPM | BODY MASS INDEX: 28.05 KG/M2 | DIASTOLIC BLOOD PRESSURE: 68 MMHG | SYSTOLIC BLOOD PRESSURE: 119 MMHG | TEMPERATURE: 98.2 F

## 2024-09-25 DIAGNOSIS — I48.91 ATRIAL FIBRILLATION, UNSPECIFIED TYPE (MULTI): Primary | ICD-10-CM

## 2024-09-25 DIAGNOSIS — K70.31 ALCOHOLIC CIRRHOSIS OF LIVER WITH ASCITES (MULTI): ICD-10-CM

## 2024-09-25 LAB
POC INR: 1.2 (ref 2–3)
POC PROTHROMBIN TIME: 14.7

## 2024-09-25 PROCEDURE — 3060F POS MICROALBUMINURIA REV: CPT | Performed by: STUDENT IN AN ORGANIZED HEALTH CARE EDUCATION/TRAINING PROGRAM

## 2024-09-25 PROCEDURE — 1159F MED LIST DOCD IN RCRD: CPT | Performed by: STUDENT IN AN ORGANIZED HEALTH CARE EDUCATION/TRAINING PROGRAM

## 2024-09-25 PROCEDURE — 85610 PROTHROMBIN TIME: CPT | Mod: QW | Performed by: INTERNAL MEDICINE

## 2024-09-25 PROCEDURE — 3044F HG A1C LEVEL LT 7.0%: CPT | Performed by: STUDENT IN AN ORGANIZED HEALTH CARE EDUCATION/TRAINING PROGRAM

## 2024-09-25 PROCEDURE — 1036F TOBACCO NON-USER: CPT | Performed by: STUDENT IN AN ORGANIZED HEALTH CARE EDUCATION/TRAINING PROGRAM

## 2024-09-25 PROCEDURE — 4010F ACE/ARB THERAPY RXD/TAKEN: CPT | Performed by: STUDENT IN AN ORGANIZED HEALTH CARE EDUCATION/TRAINING PROGRAM

## 2024-09-25 PROCEDURE — 3074F SYST BP LT 130 MM HG: CPT | Performed by: STUDENT IN AN ORGANIZED HEALTH CARE EDUCATION/TRAINING PROGRAM

## 2024-09-25 PROCEDURE — 99214 OFFICE O/P EST MOD 30 MIN: CPT | Performed by: STUDENT IN AN ORGANIZED HEALTH CARE EDUCATION/TRAINING PROGRAM

## 2024-09-25 PROCEDURE — 99211 OFF/OP EST MAY X REQ PHY/QHP: CPT

## 2024-09-25 PROCEDURE — 1123F ACP DISCUSS/DSCN MKR DOCD: CPT | Performed by: STUDENT IN AN ORGANIZED HEALTH CARE EDUCATION/TRAINING PROGRAM

## 2024-09-25 PROCEDURE — 1111F DSCHRG MED/CURRENT MED MERGE: CPT | Performed by: STUDENT IN AN ORGANIZED HEALTH CARE EDUCATION/TRAINING PROGRAM

## 2024-09-25 PROCEDURE — 3048F LDL-C <100 MG/DL: CPT | Performed by: STUDENT IN AN ORGANIZED HEALTH CARE EDUCATION/TRAINING PROGRAM

## 2024-09-25 PROCEDURE — 3078F DIAST BP <80 MM HG: CPT | Performed by: STUDENT IN AN ORGANIZED HEALTH CARE EDUCATION/TRAINING PROGRAM

## 2024-09-25 PROCEDURE — 1125F AMNT PAIN NOTED PAIN PRSNT: CPT | Performed by: STUDENT IN AN ORGANIZED HEALTH CARE EDUCATION/TRAINING PROGRAM

## 2024-09-25 ASSESSMENT — PAIN SCALES - GENERAL: PAINLEVEL: 4

## 2024-09-25 NOTE — PATIENT INSTRUCTIONS
If you have any questions or need assistance, please don't hesitate to contact us.     Our offices are located at [facility].  Please use the numbers below when calling.   Dr. Leal: Office [phone] Fax: [phone]  Hepatology Nurse Coordinator: Poonam HOLT [phone]       [phone]   (See below for department name when scheduling)      PLAN OF CARE:    TEST DUE    [x]   LABS  Type: LIVER CANCER SCREENING: Patients with advanced fibrosis or cirrhosis are at increased risk for liver cancer.  Successful treatment of liver cancer depends on early detection.  You should have regular interval screening for liver cancer every 6 months.  A blood test called Alpha-fetoprotein (AFP) and ultrasound of the liver is an important part of liver cancer screening to detect tumors.  You should also expect to have a follow up appointment with your liver doctor every 6 months Due : Today and March    [x]   IMAGING (Department Radiology)  Type: US Due : First available and March    [x]   FOLLOW UP  (Department Gastro)   Due : March    []          []         []        []       You should have no more than 2,000 mg (sodium/salt) per day.  1 teaspoon of salt = about 2,300 mg.  An average sandwich is 1,522mg. Despite what people think, 70% of sodium intake comes from packaged and prepared foods-not from table salt added to food.  Read labels and choose foods low in sodium.  Using label information on food packages will help make the best low salt choices.  Avoid salt by preparing your own foods when you can, using fresh ingredients.  Season home cooking with fresh herbs and spices.     Foods to AVOID  Eliminate the salt shaker  Buy fresh  Avoid canned soups, entrees, vegetables, boxed, and frozen foods  Avoid bread, rolls, bagels...  Cold cuts, cured meats  Pizza  Poultry should be fresh, choose 15% solution  Sandwiches (bread and lunch meats)  Consider your condiments  Restaurant food      DAILY WEIGHTS  It is important to weigh yourself every morning.    Weight gain is the first sign of fluid retention and helps us recognize when fluid overload is potentially or already happening.  Smaller amounts of fluid cannot always be detected visually.   Daily weights helps us know if there is too much water on your body and when to make needed adjustments to diuretics and/or diet.    A paracentesis (tap) is performed when salt restriction or water pills are not enough.   Having a paracentesis is not a cure.  Long-term serial taps can lead to significant loss of protein and worsen malnutrition, leading to muscle wasting.     HOW IS ASCITES MANAGED AND TREATED?  Follow a low salt 2,000 mg/day diet  Take diuretics (water pills) as prescribed to get rid of excess fluid  Weigh yourself every morning after going to the bathroom and record it.  Reporting changes in your weight.  You should call the office if you are not losing at least 5 lbs a week or gaining 5 lbs or more a week.  Do not drink alcohol       Dr. Leal  Locations: WED: [facility] ([facility] 6th Floor) 8:00  AM - 12:00 PM  THURS: [facility] ([facility]) 8:00 AM - 4:20 PM

## 2024-09-25 NOTE — PROGRESS NOTES
Mr. Petersen is a consult from Dr. Whelan to our clinic for the management of his warfarin therapy due to his history of atrial fibrillation. He has previously been taking Eliquis 5mg tablets. He was in the hospital last week for pain. No other changes to his medications. His appetite has been very good after being in the hospital last week. He had no greens this week. He typically drinks beer about 2-3 drinks a week. No signs or symptoms of bleeding. Today his INR is 1.2 despite being on 2.5mg since Saturday. Given his INR is still low, please take 2.5mg tonight and then continue 1.25mg daily. We will follow up on Friday.

## 2024-09-25 NOTE — PROGRESS NOTES
[facility] HEPATOLOGY CLINIC NOTE     History of Present Illness:   Kiko Petersen is a 64 y.o. male who presents to clinic for evaluation of decompensated cirrhosis, alcohol-related liver disease.    He also has a medical history of a heterozygous H63D mutation, ulcerative colitis, prostate adenocarcinoma Maximo 6 currently under surveillance, diabetes mellitus, obstructive sleep apnea on CPAP, hypertension and atrial fibrillation.  The patient's last colonoscopy was in 2022 that demonstrated inactive quiescent ulcerative colitis.  Segmental colonic biopsies were obtained.  There were patchy areas of pseudopolyps in the sigmoid colon, descending colon and transverse colon.  These biopsies demonstrated normal colonic mucosa without any pathologic findings.  The patient is currently on sulfasalazine for his ulcerative colitis.    The patient was recently admitted to the hospital for left leg pain and weakness as well as hyponatremia.  He was diagnosed with meralgia paresthetica related to severe spinal stenosis and foraminal compression of his spinal nerves.  His hypomagnesia Nutrini improved with hydration.  I did appear that the hyponatremia and hypokalemia related to use of Lasix which is currently being held.    I reviewed the patient's history of liver disease and it has been attributed to alcohol related liver disease.  The patient is reporting of his alcohol use history includes heavy drinking prior to the age of 48.  He will consume 12 beers a day at that time.  He found out that he had stage IV fibrosis around this time and cut back to approximately 4 beers a day.  Now he tries very hard to abstain from alcohol.  His last drink was on Sunday when he attended a family gathering to watch the football game.  Otherwise he typically drinks nonalcoholic beer.  There has been no evidence of primary sclerosing cholangitis based on imaging however I do not see an MRCP.  He does not have a cholestatic  pattern to his liver injury and labs that would fit alcohol-related liver disease.  He has had a liver biopsy in 2003 that demonstrated steatohepatitis and stage I fibrosis.    The patient has frequent bowel movements and does not have any issues with sleeping or confusion.  He is currently holding his home diuretics and he has not had any fluid reaccumulation in his lower extremities or his abdomen.    The patient does not smoke cigarettes.  He quit many years ago.  He does not use any illicit drugs.  He currently lives alone.  He rarely cooks for himself and frequents a local bar called in the basement near his home for food and a beer once in a while.  He tries to adopt a low-sodium diet.  He does use a cane or a walker for significantly long distances.  The patient does have spinal stenosis that causes him back pain.    Review of Systems  Review of systems otherwise negative.     Social History   reports that he quit smoking about 44 years ago. His smoking use included cigarettes. He started smoking about 45 years ago. He has a 0.1 pack-year smoking history. He has never used smokeless tobacco. He reports current alcohol use. He reports that he does not use drugs.     Family History  family history includes Breast cancer in his mother's sister; Colon cancer in his father's sister; Emphysema in his mother and mother's sister; Heart attack in his brother and maternal grandfather; Hypertension in his brother, father, father's brother, father's sister, maternal grandfather, maternal grandmother, mother, and mother's sister; Lung cancer in his father.     Allergies  Allergies   Allergen Reactions    Penicillins Other, Swelling, Unknown and Anaphylaxis     Cefazolin tolerated for post-op prophylaxis following ortho procedure    Erythromycin Diarrhea       Medications  Current Outpatient Medications   Medication Instructions    folic acid (Folvite) 1 mg tablet TAKE 1 TABLET BY MOUTH ONCE DAILY    ketoconazole (NIZOral)  2 % cream Topical, As needed    Lasix 20 mg, oral, Daily    lisinopril 20 mg, oral, Daily    metFORMIN (Glucophage) 500 mg tablet TAKE 1 TABLET BY MOUTH TWO TIMES A DAY WITH MEALS    metoprolol tartrate (Lopressor) 50 mg tablet TAKE 1 TABLET BY MOUTH EVERY 12 HOURS    NIFEdipine ER (ADALAT CC) 30 mg, oral, Daily    sulfaSALAzine (Azulfidine) 500 mg tablet TAKE 2 TABLETS BY MOUTH THREE TIMES A DAY    tadalafil (CIALIS) 5 mg, oral, Daily    tamsulosin (FLOMAX) 0.8 mg, oral, Daily    warfarin (Coumadin) 2.5 mg tablet Take 1.25mg (1/2 tablet ) by mouth Tuesdays and Thursdays and 2.5mg (1 tablet) by mouth all other days or as directed by Coumadin clinic        Visit Vitals  /68   Pulse 80   Temp 36.8 °C (98.2 °F)   Resp 20      Physical Exam  Middle-age man who appears older than stated age  He has some kyphosis  There is telangiectasias on the face  Abdomen is distended but soft and nontender palpation, it is tense but not with ascites  There is no lower extremity edema  Cardio exam are unremarkable  No asterixis or pronator drift    Labs    Lab Results   Component Value Date    WBC 3.3 (L) 09/19/2024    HGB 9.1 (L) 09/19/2024    HCT 25.9 (L) 09/19/2024    MCV 99 09/19/2024     (L) 09/19/2024     Lab Results   Component Value Date    GLUCOSE 104 (H) 09/19/2024    CALCIUM 7.2 (L) 09/19/2024     (L) 09/19/2024    K 3.2 (L) 09/19/2024    CO2 24 09/19/2024    CL 98 09/19/2024    BUN 10 09/19/2024    CREATININE 0.63 09/19/2024     Lab Results   Component Value Date    ALT 37 09/17/2024    AST 66 (H) 09/17/2024    ALKPHOS 98 09/17/2024    BILITOT 1.7 (H) 09/17/2024     Lab Results   Component Value Date    INR 1.1 09/20/2024    INR 1.3 (H) 09/19/2024    INR 2.0 (H) 09/18/2024    PROTIME 12.9 (H) 09/20/2024    PROTIME 14.7 (H) 09/19/2024    PROTIME 22.2 (H) 09/18/2024       ASSESSMENT AND PLAN:    # Decompensated cirrhosis  # Alcohol related liver disease    Based on his historic alcohol use a total years a  day, I do think this is consistent with alcohol related liver disease.  Given the patient is now relatively abstinent from alcohol, he may have recompensated and regain some liver function.  He does not have any signs of ascites lower extremity edema.  We did discuss his history of drinking and its contribution to his current pathology of his liver.  I strongly recommended that he avoid all alcohol in the future to avoid a future decompensation in future hospital admissions for hyponatremia.    MELD 3.0: 16 at 9/19/2024  4:53 AM  MELD-Na: 11 at 9/19/2024  4:53 AM  Calculated from:  Serum Creatinine: 0.63 mg/dL (Using min of 1 mg/dL) at 9/19/2024  4:53 AM  Serum Sodium: 129 mmol/L at 9/19/2024  4:53 AM  Total Bilirubin: 1.7 mg/dL at 9/17/2024  8:02 AM  Serum Albumin: 3.6 g/dL (Using max of 3.5 g/dL) at 9/17/2024  8:02 AM  INR(ratio): 1.3 at 9/19/2024  4:53 AM  Age at listing (hypothetical): 64 years  Sex: Male at 9/19/2024  4:53 AM    Concerning the overall picture, this is a 64-year-old man who would be a marginal candidate for liver transplant.  He does have some back pain and relative debility with an antalgic gait from the spinal stenosis.  The meralgia paresthetica does limit his ability to walk when he has severe pain and weakness.  He does continue to drink but does demonstrate insight into his liver disease and that it was caused by alcohol.  He is trying to adopt consuming nonalcoholic beer.  The patient does live alone and it is not clear that he has a strong social support system.    # At risk for hepatocellular carcinoma  The patient will need ultrasounds every 6 months with an AFP    # At risk for esophageal varices  Last upper endoscopy was 2022 that demonstrated no esophageal varices.  Given his relatively low MELD score, he is CTP class B.  He should have a repeat upper endoscopy for screening of varices every 2 to 3 years.  Given the patient is currently compensated, I will obtain a FibroScan.  If he  does have a stiffness that is above 20-25.  This would be consistent with clinically significant portal hypertension and he may benefit from a beta-blocker.  This would eliminate the need for screening upper endoscopies.    # Ascites  Resolved, will obtain an ultrasound for assessment of ascites and obtain a paracentesis if needed.    # At risk for micronutrient deficiencies  Will obtain a micronutrient workup and replete as needed    # At risk for infections  We discussed the importance of avoiding raw seafood and shellfish.  He should have updated vaccinations for hepatitis A, hepatitis B, Pneumovax, influenza and COVID-19.    Humphrey Leal MD  [facility], [facility]

## 2024-09-25 NOTE — PATIENT INSTRUCTIONS
Your INR was low at 1.2. Please take 2.5mg today and then continue your weekly regimen of 1.25mg. Next INR check on Friday.   If any questions arise do not hesitate to call us at [phone] M-F from 8:30am-4:30pm or at   [phone] after 5pm or on the weekends. Continue to monitor for any excess bleeding or bruising, especially for blood in your stool.

## 2024-09-27 ENCOUNTER — ANTICOAGULATION - WARFARIN VISIT (OUTPATIENT)
Dept: PHARMACY | Facility: HOSPITAL | Age: 64
End: 2024-09-27
Payer: MEDICARE

## 2024-09-27 DIAGNOSIS — I48.91 ATRIAL FIBRILLATION, UNSPECIFIED TYPE (MULTI): Primary | ICD-10-CM

## 2024-09-27 LAB
POC INR: 1.2 (ref 2–3)
POC PROTHROMBIN TIME: 13.9

## 2024-09-27 PROCEDURE — 99211 OFF/OP EST MAY X REQ PHY/QHP: CPT

## 2024-09-27 PROCEDURE — 85610 PROTHROMBIN TIME: CPT | Mod: QW | Performed by: INTERNAL MEDICINE

## 2024-09-27 NOTE — PROGRESS NOTES
Mr. Petersen is a consult from Dr. Whelan to our clinic for the management of his warfarin therapy due to his history of atrial fibrillation. He has previously been taking Eliquis 5mg tablets. He was in the hospital last week for pain. No other changes to his medications. His appetite has been very good after being in the hospital last week. He reported eating about an extra meal a day. He had no greens this week. He has been eating more fruit such as melons, and cantaloupe. He eats at least 1 cup a day. He was previously drinking beer about 2-3 drinks a week. He reported since being hospitalized he is not drinking anymore. No signs or symptoms of bleeding. Today his INR is 1.2 despite being on 2.5mg since Saturday. Given his INR is still low, please take 5mg tonight and then continue 2.5mg daily. We will follow up on Monday.

## 2024-09-27 NOTE — PATIENT INSTRUCTIONS
Your INR was low at 1.2. Please take 5mg today and then 2.5mg on Saturday and Sunday.  Next INR check on Monday.   If any questions arise do not hesitate to call us at [phone] M-F from 8:30am-4:30pm or at   [phone] after 5pm or on the weekends. Continue to monitor for any excess bleeding or bruising, especially for blood in your stool

## 2024-09-30 ENCOUNTER — LAB (OUTPATIENT)
Dept: LAB | Facility: LAB | Age: 64
End: 2024-09-30
Payer: MEDICARE

## 2024-09-30 ENCOUNTER — HOSPITAL ENCOUNTER (OUTPATIENT)
Dept: RADIOLOGY | Facility: CLINIC | Age: 64
Discharge: HOME | End: 2024-09-30
Payer: MEDICARE

## 2024-09-30 ENCOUNTER — ANTICOAGULATION - WARFARIN VISIT (OUTPATIENT)
Dept: PHARMACY | Facility: HOSPITAL | Age: 64
End: 2024-09-30
Payer: MEDICARE

## 2024-09-30 DIAGNOSIS — K70.31 ALCOHOLIC CIRRHOSIS OF LIVER WITH ASCITES (MULTI): ICD-10-CM

## 2024-09-30 DIAGNOSIS — I48.91 ATRIAL FIBRILLATION, UNSPECIFIED TYPE (MULTI): Primary | ICD-10-CM

## 2024-09-30 DIAGNOSIS — E11.9 TYPE 2 DIABETES MELLITUS WITHOUT COMPLICATION, WITHOUT LONG-TERM CURRENT USE OF INSULIN (MULTI): Primary | ICD-10-CM

## 2024-09-30 LAB
25(OH)D3 SERPL-MCNC: 23 NG/ML (ref 30–100)
AFP SERPL-MCNC: 5 NG/ML (ref 0–9)
ALBUMIN SERPL BCP-MCNC: 3.5 G/DL (ref 3.4–5)
ALP SERPL-CCNC: 151 U/L (ref 33–136)
ALT SERPL W P-5'-P-CCNC: 23 U/L (ref 10–52)
ANION GAP SERPL CALC-SCNC: 10 MMOL/L (ref 10–20)
AST SERPL W P-5'-P-CCNC: 44 U/L (ref 9–39)
BILIRUB DIRECT SERPL-MCNC: 0.2 MG/DL (ref 0–0.3)
BILIRUB SERPL-MCNC: 0.9 MG/DL (ref 0–1.2)
BUN SERPL-MCNC: 7 MG/DL (ref 6–23)
CALCIUM SERPL-MCNC: 8.4 MG/DL (ref 8.6–10.3)
CHLORIDE SERPL-SCNC: 99 MMOL/L (ref 98–107)
CO2 SERPL-SCNC: 26 MMOL/L (ref 21–32)
CREAT SERPL-MCNC: 0.58 MG/DL (ref 0.5–1.3)
EGFRCR SERPLBLD CKD-EPI 2021: >90 ML/MIN/1.73M*2
GLUCOSE SERPL-MCNC: 182 MG/DL (ref 74–99)
INR PPP: 1.7 (ref 0.9–1.1)
POC INR: 1.5 (ref 2–3)
POC PROTHROMBIN TIME: 17.6
POTASSIUM SERPL-SCNC: 4.4 MMOL/L (ref 3.5–5.3)
PROT SERPL-MCNC: 6.3 G/DL (ref 6.4–8.2)
PROTHROMBIN TIME: 19 SECONDS (ref 9.8–12.8)
SODIUM SERPL-SCNC: 131 MMOL/L (ref 136–145)

## 2024-09-30 PROCEDURE — 82248 BILIRUBIN DIRECT: CPT

## 2024-09-30 PROCEDURE — 99211 OFF/OP EST MAY X REQ PHY/QHP: CPT

## 2024-09-30 PROCEDURE — 80053 COMPREHEN METABOLIC PANEL: CPT

## 2024-09-30 PROCEDURE — 85610 PROTHROMBIN TIME: CPT | Mod: QW | Performed by: INTERNAL MEDICINE

## 2024-09-30 PROCEDURE — 82105 ALPHA-FETOPROTEIN SERUM: CPT

## 2024-09-30 PROCEDURE — 82306 VITAMIN D 25 HYDROXY: CPT

## 2024-09-30 PROCEDURE — 76705 ECHO EXAM OF ABDOMEN: CPT

## 2024-09-30 PROCEDURE — 85610 PROTHROMBIN TIME: CPT

## 2024-09-30 PROCEDURE — 76705 ECHO EXAM OF ABDOMEN: CPT | Performed by: RADIOLOGY

## 2024-09-30 NOTE — PROGRESS NOTES
Notes: Mr. Petersen is a consult from Dr. Whelan to our clinic for the management of his warfarin therapy due to his history of atrial fibrillation. He has previously been taking Eliquis 5mg tablets. He was in the hospital last week for pain. No other changes to his medications. He has not been eating greens this week. He reported since being hospitalized he is not drinking anymore. His appetite has gotten much better since being out of the hospital. No signs or symptoms of bleeding. Today his INR is 1.5 despite boosting with 5 mg on Friday and 2.5 mg other days. Given his INR is still low, please take 5mg today and tomorrow, and then 2.5mg on Wednesday and Thursday. We will follow up on Friday.

## 2024-09-30 NOTE — PATIENT INSTRUCTIONS
Your INR was low at 1.5. Please take 5mg today and tomorrow, and then 2.5mg on Wednesday and Thursday.  Next INR check on Friday.   If any questions arise do not hesitate to call us at [phone] M-F from 8:30am-4:30pm or at   [phone] after 5pm or on the weekends. Continue to monitor for any excess bleeding or bruising, especially for blood in your stool.

## 2024-10-02 ENCOUNTER — PATIENT OUTREACH (OUTPATIENT)
Dept: PRIMARY CARE | Facility: CLINIC | Age: 64
End: 2024-10-02

## 2024-10-02 ENCOUNTER — OFFICE VISIT (OUTPATIENT)
Dept: PRIMARY CARE | Facility: CLINIC | Age: 64
End: 2024-10-02
Payer: MEDICARE

## 2024-10-02 VITALS
TEMPERATURE: 97.9 F | HEART RATE: 68 BPM | SYSTOLIC BLOOD PRESSURE: 120 MMHG | BODY MASS INDEX: 28.09 KG/M2 | WEIGHT: 195.8 LBS | OXYGEN SATURATION: 95 % | DIASTOLIC BLOOD PRESSURE: 50 MMHG

## 2024-10-02 DIAGNOSIS — G57.12 MERALGIA PARAESTHETICA, LEFT: ICD-10-CM

## 2024-10-02 DIAGNOSIS — M51.361 DEGENERATION OF INTERVERTEBRAL DISC OF LUMBAR REGION WITH LOWER EXTREMITY PAIN: ICD-10-CM

## 2024-10-02 DIAGNOSIS — M54.16 LEFT LUMBAR RADICULOPATHY: ICD-10-CM

## 2024-10-02 DIAGNOSIS — E87.1 HYPONATREMIA: Primary | ICD-10-CM

## 2024-10-02 LAB — ZINC SERPL-MCNC: 43.8 UG/DL (ref 60–120)

## 2024-10-02 PROCEDURE — 3078F DIAST BP <80 MM HG: CPT | Performed by: INTERNAL MEDICINE

## 2024-10-02 PROCEDURE — 99495 TRANSJ CARE MGMT MOD F2F 14D: CPT | Performed by: INTERNAL MEDICINE

## 2024-10-02 PROCEDURE — 4010F ACE/ARB THERAPY RXD/TAKEN: CPT | Performed by: INTERNAL MEDICINE

## 2024-10-02 PROCEDURE — 1159F MED LIST DOCD IN RCRD: CPT | Performed by: INTERNAL MEDICINE

## 2024-10-02 PROCEDURE — 1111F DSCHRG MED/CURRENT MED MERGE: CPT | Performed by: INTERNAL MEDICINE

## 2024-10-02 PROCEDURE — 3060F POS MICROALBUMINURIA REV: CPT | Performed by: INTERNAL MEDICINE

## 2024-10-02 PROCEDURE — 1123F ACP DISCUSS/DSCN MKR DOCD: CPT | Performed by: INTERNAL MEDICINE

## 2024-10-02 PROCEDURE — 3074F SYST BP LT 130 MM HG: CPT | Performed by: INTERNAL MEDICINE

## 2024-10-02 PROCEDURE — 3048F LDL-C <100 MG/DL: CPT | Performed by: INTERNAL MEDICINE

## 2024-10-02 PROCEDURE — 3044F HG A1C LEVEL LT 7.0%: CPT | Performed by: INTERNAL MEDICINE

## 2024-10-02 RX ORDER — LISINOPRIL 20 MG/1
20 TABLET ORAL DAILY
Qty: 90 TABLET | Refills: 0 | Status: CANCELLED | OUTPATIENT
Start: 2024-10-02

## 2024-10-02 RX ORDER — NIFEDIPINE 30 MG/1
30 TABLET, FILM COATED, EXTENDED RELEASE ORAL DAILY
Qty: 90 TABLET | Refills: 0 | Status: CANCELLED | OUTPATIENT
Start: 2024-10-02

## 2024-10-02 RX ORDER — METFORMIN HYDROCHLORIDE 500 MG/1
500 TABLET ORAL
Qty: 180 TABLET | Refills: 0 | Status: CANCELLED | OUTPATIENT
Start: 2024-10-02

## 2024-10-02 ASSESSMENT — ENCOUNTER SYMPTOMS
MUSCULOSKELETAL NEGATIVE: 1
GASTROINTESTINAL NEGATIVE: 1
HEMATOLOGIC/LYMPHATIC NEGATIVE: 1
CONSTITUTIONAL NEGATIVE: 1
EYES NEGATIVE: 1
ENDOCRINE NEGATIVE: 1
PSYCHIATRIC NEGATIVE: 1
NEUROLOGICAL NEGATIVE: 1
ALLERGIC/IMMUNOLOGIC NEGATIVE: 1
CARDIOVASCULAR NEGATIVE: 1
RESPIRATORY NEGATIVE: 1

## 2024-10-02 NOTE — PROGRESS NOTES
Subjective   Patient ID: Kiko Petersen is a 64 y.o. male who presents for Hospital Follow-up.  Patient presents today in follow up re:   recent hospitalization for acute onset of left hip pain and weakness in the left leg.  Incidentally noted to be slightly hyponatremic which was felt to be due by Nephrology to combination of cirrhosis, diuretic use and SIADH due to acute pain.  Sodium improved to 131 on last check 2 days ago.    Patient seen and evaluated by spine Ortho per DC summary though I see no note documenting this.  Patient states at this time he still has some numbness in the upper leg, but pain is minimal and he is now ambulating without difficulty.        Patient was contacted by Transitional Care Management services after discharge. This encounter and supporting documentation was reviewed.     The complexity of medical decision making for this patient's transitional care is moderate.         Review of Systems   Constitutional: Negative.    HENT: Negative.     Eyes: Negative.    Respiratory: Negative.     Cardiovascular: Negative.    Gastrointestinal: Negative.    Endocrine: Negative.    Genitourinary: Negative.    Musculoskeletal: Negative.         See HPI   Skin: Negative.    Allergic/Immunologic: Negative.    Neurological: Negative.    Hematological: Negative.    Psychiatric/Behavioral: Negative.         Objective     Blood pressure 120/50, pulse 68, temperature 36.6 °C (97.9 °F), temperature source Temporal, weight 88.8 kg (195 lb 12.8 oz), SpO2 95%.   Physical Exam  Vitals reviewed.   Constitutional:       Appearance: Normal appearance.   Neck:      Vascular: No carotid bruit.   Cardiovascular:      Rate and Rhythm: Normal rate and regular rhythm.      Pulses: Normal pulses.      Heart sounds: Normal heart sounds. No murmur heard.  Pulmonary:      Effort: Pulmonary effort is normal.      Breath sounds: Normal breath sounds. No wheezing or rales.   Abdominal:      General: Abdomen is flat. There is  no distension.      Palpations: Abdomen is soft.      Tenderness: There is no abdominal tenderness.   Musculoskeletal:         General: Normal range of motion.      Cervical back: Normal range of motion and neck supple.   Skin:     General: Skin is warm and dry.   Neurological:      General: No focal deficit present.      Mental Status: He is alert and oriented to person, place, and time.   Psychiatric:         Mood and Affect: Mood normal.         Behavior: Behavior normal.         Assessment/Plan   Problem List Items Addressed This Visit       Meralgia paraesthetica, left    Relevant Orders    Referral to Orthopaedic Surgery    Hyponatremia - Primary     Other Visit Diagnoses       Left lumbar radiculopathy        Relevant Orders    Referral to Orthopaedic Surgery    Degeneration of intervertebral disc of lumbar region with lower extremity pain        Relevant Orders    Referral to Orthopaedic Surgery          Hyponatremia resolved.  Will continue to follow on labs.  He has been told by Nephrology that he does not need follow up with them.  In re:  back and radiculopathy, he has not been referred by hospitalist to Ortho.  Will refer to Dr. Araya.    Follow up as scheduled

## 2024-10-03 LAB
A-TOCOPHEROL VIT E SERPL-MCNC: 8.7 MG/L (ref 5.5–18)
ANNOTATION COMMENT IMP: ABNORMAL
BETA+GAMMA TOCOPHEROL SERPL-MCNC: 1.8 MG/L (ref 0–6)
RETINYL PALMITATE SERPL-MCNC: 0.04 MG/L (ref 0–0.1)
VIT A SERPL-MCNC: 0.26 MG/L (ref 0.3–1.2)

## 2024-10-04 ENCOUNTER — ANTICOAGULATION - WARFARIN VISIT (OUTPATIENT)
Dept: PHARMACY | Facility: HOSPITAL | Age: 64
End: 2024-10-04
Payer: MEDICARE

## 2024-10-04 ENCOUNTER — APPOINTMENT (OUTPATIENT)
Dept: CARDIOLOGY | Facility: CLINIC | Age: 64
End: 2024-10-04
Payer: MEDICARE

## 2024-10-04 DIAGNOSIS — K70.31 ALCOHOLIC CIRRHOSIS OF LIVER WITH ASCITES (MULTI): ICD-10-CM

## 2024-10-04 DIAGNOSIS — I48.91 ATRIAL FIBRILLATION, UNSPECIFIED TYPE (MULTI): Primary | ICD-10-CM

## 2024-10-04 LAB
LABORATORY REPORT: NORMAL
PETH INTERPRETATION: NORMAL
PLPETH BLD-MCNC: 141 NG/ML
POC INR: 2 (ref 2–3)
POC PROTHROMBIN TIME: 24.4
POPETH BLD-MCNC: 259 NG/ML

## 2024-10-04 PROCEDURE — 85610 PROTHROMBIN TIME: CPT | Mod: QW | Performed by: INTERNAL MEDICINE

## 2024-10-04 PROCEDURE — 99211 OFF/OP EST MAY X REQ PHY/QHP: CPT

## 2024-10-04 RX ORDER — FUROSEMIDE 40 MG/1
20 TABLET ORAL DAILY PRN
Qty: 45 TABLET | Refills: 0 | OUTPATIENT
Start: 2024-10-04

## 2024-10-04 RX ORDER — FUROSEMIDE 40 MG/1
20 TABLET ORAL DAILY
Qty: 90 TABLET | Refills: 0 | OUTPATIENT
Start: 2024-10-04

## 2024-10-04 NOTE — PROGRESS NOTES
Mr. Petersen is a consult from Dr. Whelan to our clinic for the management of his warfarin therapy due to his history of atrial fibrillation. He has previously been taking Eliquis 5mg tablets. He was in the hospital last week for pain. No other changes to his medications. He has not been eating greens. He reported since being hospitalized he is not drinking anymore. His appetite has gotten much better since being out of the hospital. No signs or symptoms of bleeding. This week we boosted with 5mg Monday, Tuesday. Since being out of the hospital he was taking 2.5mg daily. He has been on a higher dose since being out of the hospital yet his INR was remaining subtherapeutic on 2.5mg daily.  Today his INR is 2.0 after the two day boost, therefore we will increase his weekly regimen to 5mg Monday, Friday and 2.5mg all other days. We will follow up next week.

## 2024-10-04 NOTE — PATIENT INSTRUCTIONS
Your INR was in range at 2.0. Please increase your weekly regimen to 5mg Monday, Friday and 2.5mg all other days. We will follow up next week.  If any questions arise do not hesitate to call us at [phone] M-F from 8:30am-4:30pm or at   [phone] after 5pm or on the weekends. Continue to monitor for any excess bleeding or bruising, especially for blood in your stool.

## 2024-10-09 ENCOUNTER — TELEPHONE (OUTPATIENT)
Dept: CARDIOLOGY | Facility: HOSPITAL | Age: 64
End: 2024-10-09

## 2024-10-09 ENCOUNTER — ANTICOAGULATION - WARFARIN VISIT (OUTPATIENT)
Dept: PHARMACY | Facility: HOSPITAL | Age: 64
End: 2024-10-09
Payer: MEDICARE

## 2024-10-09 DIAGNOSIS — I48.91 ATRIAL FIBRILLATION, UNSPECIFIED TYPE (MULTI): Primary | ICD-10-CM

## 2024-10-09 LAB
POC INR: 3.1 (ref 2–3)
POC PROTHROMBIN TIME: 37.4

## 2024-10-09 PROCEDURE — 85610 PROTHROMBIN TIME: CPT | Mod: QW | Performed by: INTERNAL MEDICINE

## 2024-10-09 PROCEDURE — 99211 OFF/OP EST MAY X REQ PHY/QHP: CPT

## 2024-10-09 NOTE — PROGRESS NOTES
Mr. Petersen is a consult from Dr. Whelan to our clinic for the management of his warfarin therapy due to his history of atrial fibrillation. He has previously been taking Eliquis 5mg tablets. He was in the hospital last week for pain. No other changes to his medications. He has not been eating greens. He reported since being hospitalized he is not drinking anymore. His appetite has gotten much better since being out of the hospital. No signs or symptoms of bleeding. Today his INR is 3.1 after an increase in his weekly regimen last week. Given his INR is slightly elevated, we will reduce it slightly to 5mg on Monday and 2.5mg all other days. We will follow up on Friday to discuss the plans for his colonoscopy next week. He is to reach out to cardiology to see if he has cardiac clearance for this procedure.

## 2024-10-09 NOTE — PATIENT INSTRUCTIONS
Your INR was slightly elevated at 3.1. Please decrease your weekly regimen to 5mg Monday and 2.5mg all other days. We will follow up on Friday to discuss the plan for the upcoming colonoscopy.  If any questions arise do not hesitate to call us at [phone] M-F from 8:30am-4:30pm or at   [phone] after 5pm or on the weekends. Continue to monitor for any excess bleeding or bruising, especially for blood in your stool.

## 2024-10-10 ENCOUNTER — TELEPHONE (OUTPATIENT)
Dept: GASTROENTEROLOGY | Facility: CLINIC | Age: 64
End: 2024-10-10
Payer: MEDICARE

## 2024-10-10 NOTE — TELEPHONE ENCOUNTER
Kiko called stating his cardiologist would like to discuss with you what the Colonoscopy entails to make sure it's safe for him to have. Dr. Bashir Whelan [phone]  Colonoscopy scheduled 10.18.24    Thank You

## 2024-10-15 DIAGNOSIS — I48.91 ATRIAL FIBRILLATION, UNSPECIFIED TYPE (MULTI): ICD-10-CM

## 2024-10-15 RX ORDER — WARFARIN 2.5 MG/1
TABLET ORAL
Qty: 72 TABLET | Refills: 1 | OUTPATIENT
Start: 2024-10-15

## 2024-10-15 RX ORDER — WARFARIN 2.5 MG/1
TABLET ORAL
Qty: 96 TABLET | Refills: 3 | Status: SHIPPED | OUTPATIENT
Start: 2024-10-15

## 2024-10-18 ENCOUNTER — PATIENT OUTREACH (OUTPATIENT)
Dept: PRIMARY CARE | Facility: CLINIC | Age: 64
End: 2024-10-18

## 2024-10-18 ENCOUNTER — APPOINTMENT (OUTPATIENT)
Dept: GASTROENTEROLOGY | Facility: EXTERNAL LOCATION | Age: 64
End: 2024-10-18
Payer: MEDICARE

## 2024-10-18 DIAGNOSIS — Z12.11 ENCOUNTER FOR SCREENING COLONOSCOPY: Primary | ICD-10-CM

## 2024-10-18 DIAGNOSIS — Z86.0100 HISTORY OF COLONIC POLYPS: ICD-10-CM

## 2024-10-18 DIAGNOSIS — D12.3 BENIGN NEOPLASM OF TRANSVERSE COLON: ICD-10-CM

## 2024-10-18 DIAGNOSIS — K51.00 ULCERATIVE (CHRONIC) PANCOLITIS WITHOUT COMPLICATIONS (MULTI): ICD-10-CM

## 2024-10-18 PROCEDURE — 0753T DGTZ GLS MCRSCP SLD LEVEL IV: CPT

## 2024-10-18 PROCEDURE — 88305 TISSUE EXAM BY PATHOLOGIST: CPT

## 2024-10-18 PROCEDURE — 88305 TISSUE EXAM BY PATHOLOGIST: CPT | Performed by: PATHOLOGY

## 2024-10-21 ENCOUNTER — LAB REQUISITION (OUTPATIENT)
Dept: LAB | Facility: HOSPITAL | Age: 64
End: 2024-10-21

## 2024-10-22 PROBLEM — I71.21 ANEURYSM OF ASCENDING AORTA WITHOUT RUPTURE (CMS-HCC): Status: ACTIVE | Noted: 2024-10-22

## 2024-10-22 ASSESSMENT — ENCOUNTER SYMPTOMS
FEVER: 0
IRREGULAR HEARTBEAT: 0
HEMATOCHEZIA: 0
HEMATURIA: 0
VOMITING: 0
SHORTNESS OF BREATH: 0
PALPITATIONS: 0
CHILLS: 0
NAUSEA: 0
COUGH: 0
DYSPNEA ON EXERTION: 0
ORTHOPNEA: 0
NEAR-SYNCOPE: 0
ALTERED MENTAL STATUS: 0
WHEEZING: 0
SYNCOPE: 0

## 2024-10-22 NOTE — PROGRESS NOTES
Chief Complaint/Reason for Visit:  Follow-up (6 month) 6 month cardiovascular follow up    History Of Present Illness:    Kiko Petersen is a 64 y.o. male that presents to the office for 6 month follow up.    Taking medications as prescribed.     PMH is significant for HTN, atrial fibrillation s/p ablation, DM type 2, BPH with adenocarcinoma of the prostate, ascending aorta aneurysm and BENNY (CPAP).     He reports having issues with left leg numbness and pain.  He was recently admitted to [facility] for hyponatremia, left leg pain/numbness.  He reports that he has f/u spinal surgery in a couple weeks.     He reports being asymptomatic with AF and only knew he had it because his smart watch notified him of AF.    Past Medical History:  He has a past medical history of Diabetes mellitus (Multi), Encounter for screening for malignant neoplasm of colon (07/30/2019), Hypertension, Unspecified cataract, and Urinary tract infection, site not specified (03/02/2020).    Past Surgical History:  He has a past surgical history that includes Hernia repair (10/14/2015); Excision / repair hydrocele pediatric; Replacement total hip lateral position (Left); and Cardiac electrophysiology procedure (N/A, 3/12/2024).      Social History:  He reports that he quit smoking about 44 years ago. His smoking use included cigarettes. He started smoking about 45 years ago. He has a 0.1 pack-year smoking history. He has never used smokeless tobacco. He reports current alcohol use. He reports that he does not use drugs.    Family History:  Family History   Problem Relation Name Age of Onset    Hypertension Mother      Emphysema Mother      Lung cancer Father      Hypertension Father      Hypertension Brother      Heart attack Brother      Breast cancer Mother's Sister      Hypertension Mother's Sister      Emphysema Mother's Sister      Colon cancer Father's Sister      Hypertension Father's Sister      Hypertension Father's Brother       Hypertension Maternal Grandmother      Hypertension Maternal Grandfather      Heart attack Maternal Grandfather          Allergies:  Penicillins and Erythromycin    Review of Systems   Constitutional: Negative for chills and fever.   Cardiovascular:  Positive for leg swelling (chronic). Negative for chest pain, dyspnea on exertion, irregular heartbeat, near-syncope, orthopnea, palpitations and syncope.   Respiratory:  Negative for cough, shortness of breath and wheezing.    Gastrointestinal:  Negative for hematochezia, melena, nausea and vomiting.   Genitourinary:  Negative for hematuria.   Psychiatric/Behavioral:  Negative for altered mental status.        Objective      Vitals reviewed.   Constitutional:       Appearance: Not in distress. Chronically ill-appearing.   Pulmonary:      Effort: Pulmonary effort is normal.      Breath sounds: Normal breath sounds.   Cardiovascular:      PMI at left midclavicular line. Normal rate. Regular rhythm. S1 with normal intensity. S2 with normal intensity.       Murmurs: There is no murmur.   Edema:     Peripheral edema present.     Pretibial: bilateral 1+ pitting edema of the pretibial area.     Ankle: bilateral 1+ pitting edema of the ankle.     Feet: bilateral 1+ pitting edema of the feet.  Abdominal:      General: Bowel sounds are normal.   Skin:     General: Skin is warm and dry.   Psychiatric:         Attention and Perception: Attention normal.         Mood and Affect: Mood normal.         Behavior: Behavior is cooperative.         Current Outpatient Medications   Medication Instructions    folic acid (Folvite) 1 mg tablet TAKE 1 TABLET BY MOUTH ONCE DAILY    ketoconazole (NIZOral) 2 % cream As needed    Lasix 20 mg, oral, Daily    lisinopril 20 mg, oral, Daily    metFORMIN (Glucophage) 500 mg tablet TAKE 1 TABLET BY MOUTH TWO TIMES A DAY WITH MEALS    metoprolol tartrate (Lopressor) 50 mg tablet TAKE 1 TABLET BY MOUTH EVERY 12 HOURS    NIFEdipine ER (ADALAT CC) 30  mg, oral, Daily    sulfaSALAzine (Azulfidine) 500 mg tablet TAKE 2 TABLETS BY MOUTH THREE TIMES A DAY    tadalafil (CIALIS) 5 mg, oral, Daily    tamsulosin (FLOMAX) 0.8 mg, oral, Daily    warfarin (Coumadin) 2.5 mg tablet Take 5mg (2 tablets) on Monday and 2.5mg (1 tablet)  by mouth all other days or as directed by Coumadin clinic        Last Labs:  CBC -  Lab Results   Component Value Date    WBC 3.3 (L) 09/19/2024    HGB 9.1 (L) 09/19/2024    HCT 25.9 (L) 09/19/2024    MCV 99 09/19/2024     (L) 09/19/2024       RENAL FUNCTION PANEL -   Lab Results   Component Value Date    GLUCOSE 182 (H) 09/30/2024     (L) 09/30/2024    K 4.4 09/30/2024    CL 99 09/30/2024    CO2 26 09/30/2024    ANIONGAP 10 09/30/2024    BUN 7 09/30/2024    CREATININE 0.58 09/30/2024    GFRMALE >90 08/12/2023    CALCIUM 8.4 (L) 09/30/2024    PHOS 3.2 07/27/2023    ALBUMIN 3.5 09/30/2024        CMP -  Lab Results   Component Value Date    CALCIUM 8.4 (L) 09/30/2024    PHOS 3.2 07/27/2023    PROT 6.3 (L) 09/30/2024    ALBUMIN 3.5 09/30/2024    AST 44 (H) 09/30/2024    ALT 23 09/30/2024    ALKPHOS 151 (H) 09/30/2024    BILITOT 0.9 09/30/2024       LIPID PANEL -   Lab Results   Component Value Date    CHOL 145 09/10/2024    TRIG 52 09/10/2024    .5 09/10/2024    CHHDL 1.3 09/10/2024    LDLF 50 08/12/2023    VLDL 10 09/10/2024    NHDL 36 09/10/2024     Lab Results   Component Value Date    LDLCALC 25 09/10/2024       Lab Results   Component Value Date     (H) 07/23/2023    HGBA1C 5.5 09/10/2024       Lab Results   Component Value Date    TSH 2.21 09/18/2024       No results found for this or any previous visit.     Last Cardiology Tests:    AF ablation 3/12/24  Acutely successful radiofrequency ablation for paroxysmal Atrial fibrillation.  Successful pulmonary vein isolation by radiofrequency ablation with demonstrated entrance block and exit block.   Patient tolerated this procedurewell without immediate  "complications.    Holter monitor 1/5/24-1/19/24  Patient has a min HR of 54 bpm, max HR of 178 bpm and average HR of 75 bpm.  Predominant underlying rhythm was sinus rhythm.  31 SVT runs occurred with the longest lasting 29.5 seconds.  Atrial fibrillation/flutter occurred with a 6% burden, ranging from 68 to 178 bpm.  The longest lasting 12 hours 52 minutes with an average rate of 111 bpm.  Atrial fibrillation/flutter was detected within ±45 seconds of symptomatic patient events.  Isolated SVE's were rare less than 1% and isolated VE's were rare less than 1%.    Echo 7/25/23:   1. Left ventricular systolic function is normal with a 70% estimated ejection fraction.  2. There is left ventricular concentric remodeling.  3. The mitral valve is mildly thickened. The mitral valve appears to be degenerative.  4. Mildly dilated aortic root.  5. The patient is in atrial fibrillation which may influence the estimate of left ventricular function and transvalvular flows.  6. Compared with study from 12/23/2022, the patient is now in atrial fibrillation with increased HR (117 bpm). No significant differences in LV dimensions and ejection fraction.    Stress test 12/23/22:   1. No clinical or electrocardiographic evidence for ischemia at maximal infusion.   2. Correlate with myocardial perfusion imaging results.   3. No ECG changes from baseline.   4. Nuclear image results are reported separately.  1. Normal stress myocardial perfusion imaging in response to  pharmacologic stress.  2. Well-maintained left ventricular function.     Visit Vitals  /76 (BP Location: Left arm)   Pulse 69   Ht 1.765 m (5' 9.5\")   Wt 88.9 kg (196 lb)   SpO2 99%   BMI 28.53 kg/m²   Smoking Status Former   BSA 2.09 m²       Assessment/Plan   The primary encounter diagnosis was Paroxysmal atrial fibrillation (Multi). A diagnosis of Aneurysm of ascending aorta without rupture (CMS-HCC) was also pertinent to this visit.    1. Paroxysmal atrial " fibrillation s/p RFA 03/12/2024   Hypertension - Yes, 1 point and Age 65-74 years - 1 point   [id number] score is 2.   Continue warfarin for goal INR 2-3    Follows with  [facility]   Continue Metoprolol tartrate 50 mg BID   TTE July 2023 with LVEF 70%  Refer to  clinical pharmacy regarding cost of apixaban.  He was switched to warfarin d/t cost of apixaban.     2. Ascending aorta aneurysm  TTE July 2023 - The aortic root appears mildly dilated and measures 4.02 cm.  Check repeat echocardiogram     3. Alcohol liver cirrhosis  Management per hepatology - Dr. Leal and is on furosemide 20 mg daily.  He states spironolactone was stopped previously d/t syncopal episodes.    KAYLEEN Carlson-CNP

## 2024-10-22 NOTE — PATIENT INSTRUCTIONS
Recommend Mediterranean style of eating  Continue current medications  Check echocardiogram  I have ordered a referral to  clinical pharmacy regarding cost of apixaban  Follow-up with Dr. Whelan in 6 months  If you have any questions or cardiac concerns, please call our office at [phone].

## 2024-10-23 ENCOUNTER — APPOINTMENT (OUTPATIENT)
Dept: CARDIOLOGY | Facility: HOSPITAL | Age: 64
End: 2024-10-23
Payer: MEDICARE

## 2024-10-23 VITALS
SYSTOLIC BLOOD PRESSURE: 110 MMHG | HEART RATE: 69 BPM | OXYGEN SATURATION: 99 % | HEIGHT: 70 IN | DIASTOLIC BLOOD PRESSURE: 76 MMHG | WEIGHT: 196 LBS | BODY MASS INDEX: 28.06 KG/M2

## 2024-10-23 DIAGNOSIS — I48.0 PAROXYSMAL ATRIAL FIBRILLATION (MULTI): Primary | ICD-10-CM

## 2024-10-23 DIAGNOSIS — I71.21 ANEURYSM OF ASCENDING AORTA WITHOUT RUPTURE (CMS-HCC): ICD-10-CM

## 2024-10-23 DIAGNOSIS — K70.31 ALCOHOLIC CIRRHOSIS OF LIVER WITH ASCITES (MULTI): ICD-10-CM

## 2024-10-23 PROCEDURE — 3060F POS MICROALBUMINURIA REV: CPT | Performed by: NURSE PRACTITIONER

## 2024-10-23 PROCEDURE — 3074F SYST BP LT 130 MM HG: CPT | Performed by: NURSE PRACTITIONER

## 2024-10-23 PROCEDURE — 1123F ACP DISCUSS/DSCN MKR DOCD: CPT | Performed by: NURSE PRACTITIONER

## 2024-10-23 PROCEDURE — 3008F BODY MASS INDEX DOCD: CPT | Performed by: NURSE PRACTITIONER

## 2024-10-23 PROCEDURE — 1160F RVW MEDS BY RX/DR IN RCRD: CPT | Performed by: NURSE PRACTITIONER

## 2024-10-23 PROCEDURE — 3044F HG A1C LEVEL LT 7.0%: CPT | Performed by: NURSE PRACTITIONER

## 2024-10-23 PROCEDURE — 3048F LDL-C <100 MG/DL: CPT | Performed by: NURSE PRACTITIONER

## 2024-10-23 PROCEDURE — 4010F ACE/ARB THERAPY RXD/TAKEN: CPT | Performed by: NURSE PRACTITIONER

## 2024-10-23 PROCEDURE — 99214 OFFICE O/P EST MOD 30 MIN: CPT | Performed by: NURSE PRACTITIONER

## 2024-10-23 PROCEDURE — 3078F DIAST BP <80 MM HG: CPT | Performed by: NURSE PRACTITIONER

## 2024-10-23 PROCEDURE — 1159F MED LIST DOCD IN RCRD: CPT | Performed by: NURSE PRACTITIONER

## 2024-10-23 PROCEDURE — 1036F TOBACCO NON-USER: CPT | Performed by: NURSE PRACTITIONER

## 2024-10-23 RX ORDER — FUROSEMIDE 20 MG/1
20 TABLET ORAL DAILY
Qty: 30 TABLET | Refills: 5 | Status: SHIPPED | OUTPATIENT
Start: 2024-10-23 | End: 2025-04-21

## 2024-10-25 ENCOUNTER — APPOINTMENT (OUTPATIENT)
Dept: PHARMACY | Facility: HOSPITAL | Age: 64
End: 2024-10-25
Payer: MEDICARE

## 2024-10-25 DIAGNOSIS — I48.91 ATRIAL FIBRILLATION, UNSPECIFIED TYPE (MULTI): Primary | ICD-10-CM

## 2024-10-25 LAB
POC INR: 2.4 (ref 2–3)
POC PROTHROMBIN TIME: 29

## 2024-10-25 PROCEDURE — 99211 OFF/OP EST MAY X REQ PHY/QHP: CPT

## 2024-10-25 PROCEDURE — 85610 PROTHROMBIN TIME: CPT | Mod: QW | Performed by: INTERNAL MEDICINE

## 2024-10-25 NOTE — PATIENT INSTRUCTIONS
Your INR was in range at 2.4. Please continue your weekly regimen to 5mg Monday and 2.5mg all other days. We will follow up in 2 weeks.  If any questions arise do not hesitate to call us at [phone] M-F from 8:30am-4:30pm or at   [phone] after 5pm or on the weekends. Continue to monitor for any excess bleeding or bruising, especially for blood in your stool.

## 2024-10-25 NOTE — PROGRESS NOTES
Mr. Petersen is a consult from Dr. Whelan to our clinic for the management of his warfarin therapy due to his history of atrial fibrillation. He has previously been taking Eliquis 5mg tablets. He was in the hospital last week for pain. No other changes to his medications. He has not been eating greens. He reported since being hospitalized he is not drinking anymore. His appetite has gotten much better since being out of the hospital. No signs or symptoms of bleeding.  He had a colonoscopy last week and resumed his warfarin on Friday. Today his INR is 2.4 therefore we will continue 5mg on Monday and 2.5mg all other days. We will follow up in 2 weeks.

## 2024-10-28 LAB
LABORATORY COMMENT REPORT: NORMAL
PATH REPORT.FINAL DX SPEC: NORMAL
PATH REPORT.GROSS SPEC: NORMAL
PATH REPORT.RELEVANT HX SPEC: NORMAL
PATH REPORT.TOTAL CANCER: NORMAL

## 2024-10-31 ENCOUNTER — APPOINTMENT (OUTPATIENT)
Dept: PHARMACY | Facility: HOSPITAL | Age: 64
End: 2024-10-31
Payer: MEDICARE

## 2024-10-31 DIAGNOSIS — I48.0 PAROXYSMAL ATRIAL FIBRILLATION (MULTI): ICD-10-CM

## 2024-11-01 ENCOUNTER — TELEPHONE (OUTPATIENT)
Dept: PRIMARY CARE | Facility: CLINIC | Age: 64
End: 2024-11-01
Payer: MEDICARE

## 2024-11-01 NOTE — TELEPHONE ENCOUNTER
Patient called in and stated that he is not able to walk or really move without pain. The patient stated that you are aware of his back and leg issues and he is asking for a muscle relaxer to be prescribed if possible.     The patient does have an appointment on 11/18/2024 with the spine specialist but he stated that the pain is so bad that he really can't wait that long.

## 2024-11-04 DIAGNOSIS — M51.361 DEGENERATION OF INTERVERTEBRAL DISC OF LUMBAR REGION WITH LOWER EXTREMITY PAIN: Primary | ICD-10-CM

## 2024-11-04 DIAGNOSIS — G57.12 MERALGIA PARAESTHETICA, LEFT: ICD-10-CM

## 2024-11-04 RX ORDER — TIZANIDINE 4 MG/1
4 TABLET ORAL EVERY 6 HOURS PRN
Qty: 30 TABLET | Refills: 1 | Status: SHIPPED | OUTPATIENT
Start: 2024-11-04

## 2024-11-08 ENCOUNTER — APPOINTMENT (OUTPATIENT)
Dept: PHARMACY | Facility: HOSPITAL | Age: 64
End: 2024-11-08
Payer: MEDICARE

## 2024-11-08 ENCOUNTER — LAB (OUTPATIENT)
Dept: LAB | Facility: LAB | Age: 64
End: 2024-11-08
Payer: MEDICARE

## 2024-11-08 DIAGNOSIS — I48.91 ATRIAL FIBRILLATION, UNSPECIFIED TYPE (MULTI): ICD-10-CM

## 2024-11-08 DIAGNOSIS — I48.91 ATRIAL FIBRILLATION, UNSPECIFIED TYPE (MULTI): Primary | ICD-10-CM

## 2024-11-08 LAB
INR PPP: ABNORMAL
POC INR: 8 (ref 2–3)
POC PROTHROMBIN TIME: ABNORMAL
PROTHROMBIN TIME: >100 SECONDS (ref 9.8–12.8)

## 2024-11-08 PROCEDURE — 99211 OFF/OP EST MAY X REQ PHY/QHP: CPT

## 2024-11-08 PROCEDURE — 85610 PROTHROMBIN TIME: CPT | Mod: QW | Performed by: INTERNAL MEDICINE

## 2024-11-08 PROCEDURE — 36415 COLL VENOUS BLD VENIPUNCTURE: CPT

## 2024-11-08 PROCEDURE — RXMED WILLOW AMBULATORY MEDICATION CHARGE

## 2024-11-08 PROCEDURE — 85610 PROTHROMBIN TIME: CPT

## 2024-11-08 RX ORDER — PHYTONADIONE 5 MG/1
TABLET ORAL
Qty: 1 TABLET | Refills: 0 | Status: SHIPPED | OUTPATIENT
Start: 2024-11-08

## 2024-11-08 NOTE — PROGRESS NOTES
Mr. Petersen is a consult from Dr. Whelan to our clinic for the management of his warfarin therapy due to his history of atrial fibrillation. He has previously been taking Eliquis 5mg tablets. No signs or symptoms of bleeding today. No headaches. He has not been eating greens. He did take some tizanidine and some Coricidin this week. Today his INR is >8. I did have him go get a blood draw which showed an elevated INR with a protime >100. As discussed with Dr. Whelan the patient is not showing any signs or symptoms of bleeding. He was instructed to hold the warfarin for the next 3 days. He will take 5mg of oral vitamin K tonight. He will eat greens tonight. Follow up on Monday. I did go over with the patient if he starts having any signs or symptoms of bleeding he needs to go to the ED or call 911.

## 2024-11-08 NOTE — PATIENT INSTRUCTIONS
Your INR was elevated at above 8. Please hold the warfarin until Monday. Please take 5mg of vitamin K today. Please eat greens today. We will follow up on Monday.  If you notice any signs or symptoms of bleeding go to the ED.    If any questions arise do not hesitate to call us at [phone] M-F from 8:30am-4:30pm or at   [phone] after 5pm or on the weekends. Continue to monitor for any excess bleeding or bruising, especially for blood in your stool.

## 2024-11-09 ENCOUNTER — PHARMACY VISIT (OUTPATIENT)
Dept: PHARMACY | Facility: CLINIC | Age: 64
End: 2024-11-09
Payer: COMMERCIAL

## 2024-11-11 ENCOUNTER — ANTICOAGULATION - WARFARIN VISIT (OUTPATIENT)
Dept: PHARMACY | Facility: HOSPITAL | Age: 64
End: 2024-11-11
Payer: MEDICARE

## 2024-11-11 DIAGNOSIS — I48.91 ATRIAL FIBRILLATION, UNSPECIFIED TYPE (MULTI): Primary | ICD-10-CM

## 2024-11-11 LAB
POC INR: 1.4 (ref 2–3)
POC PROTHROMBIN TIME: 16.7

## 2024-11-11 PROCEDURE — 85610 PROTHROMBIN TIME: CPT | Mod: QW | Performed by: INTERNAL MEDICINE

## 2024-11-11 PROCEDURE — 99211 OFF/OP EST MAY X REQ PHY/QHP: CPT

## 2024-11-11 NOTE — PROGRESS NOTES
Mr. Petersen is a consult from Dr. Whelan to our clinic for the management of his warfarin therapy due to his history of atrial fibrillation. He has previously been taking Eliquis 5mg tablets. No signs or symptoms of bleeding today. No headaches. He has not been eating greens. He did take some tizanidine and some Coricidin last week. He states he may have taken a dose of acetaminophen but was not using it regularly at all and if he did, it was only 2 tablets of arthritis which he normally uses for leg pain. We thoroughly discussed reasons for INR elevation and could not identify any specific reasons. Today his INR is 1.4. The patient held warfarin for 3 days and took 5 mg of oral vitamin K on Saturday, 11/9. We will have him take 1.25 mg daily until we see him on Friday. Follow up on Friday.

## 2024-11-11 NOTE — PATIENT INSTRUCTIONS
Your INR was low at 1.4. Please take 1.25 mg (1/2 tablet) daily until we see you again. We will follow up on Friday.   If any questions arise do not hesitate to call us at [phone] M-F from 8:30am-4:30pm or at   [phone] after 5pm or on the weekends. Continue to monitor for any excess bleeding or bruising, especially for blood in your stool.

## 2024-11-13 ENCOUNTER — TELEPHONE (OUTPATIENT)
Dept: PHARMACY | Facility: HOSPITAL | Age: 64
End: 2024-11-13
Payer: MEDICARE

## 2024-11-13 DIAGNOSIS — I48.91 ATRIAL FIBRILLATION, UNSPECIFIED TYPE (MULTI): Primary | ICD-10-CM

## 2024-11-13 PROCEDURE — RXMED WILLOW AMBULATORY MEDICATION CHARGE

## 2024-11-13 NOTE — TELEPHONE ENCOUNTER
Patient Assistance Program Approval:     We are pleased to inform you that your application for assistance has been approved.     This approval is valid through  11/13/25  as long as the following criteria continue to be satisfied:     Your medication (Eliquis) remains covered under your current insurance plan.   Your prescriber does not discontinue therapy.   You do not seek reimbursement from any other private or government-funded programs for the  medication.    Under this program, the pharmacy will first bill your insurance plan for your indemnified specified medication. The [employer] Assistance Fund will then offset your copay balance, so that your out-of pocket expense for your specialty medication will be $0.00.    John Hdez, PharmD

## 2024-11-15 ENCOUNTER — PHARMACY VISIT (OUTPATIENT)
Dept: PHARMACY | Facility: CLINIC | Age: 64
End: 2024-11-15
Payer: COMMERCIAL

## 2024-11-15 ENCOUNTER — ANTICOAGULATION - WARFARIN VISIT (OUTPATIENT)
Dept: PHARMACY | Facility: HOSPITAL | Age: 64
End: 2024-11-15
Payer: MEDICARE

## 2024-11-15 DIAGNOSIS — I48.0 PAROXYSMAL ATRIAL FIBRILLATION (MULTI): ICD-10-CM

## 2024-11-15 DIAGNOSIS — I48.91 ATRIAL FIBRILLATION, UNSPECIFIED TYPE (MULTI): Primary | ICD-10-CM

## 2024-11-15 LAB
POC INR: 1.3 (ref 2–3)
POC PROTHROMBIN TIME: 15.1

## 2024-11-15 PROCEDURE — 99211 OFF/OP EST MAY X REQ PHY/QHP: CPT

## 2024-11-15 PROCEDURE — 85610 PROTHROMBIN TIME: CPT | Mod: QW | Performed by: INTERNAL MEDICINE

## 2024-11-15 NOTE — PROGRESS NOTES
Mr. Petersen is a consult from Dr. Whelan to our clinic for the management of his warfarin therapy due to his history of atrial fibrillation. He has previously been taking Eliquis 5mg tablets. No signs or symptoms of bleeding today. No headaches. He has not been eating greens. He did take some tizanidine and some Coricidin last week. He states he may have taken a dose of acetaminophen but was not using it regularly at all and if he did, it was only 2 tablets of arthritis which he normally uses for leg pain. We thoroughly discussed reasons for INR elevation and could not identify any specific reasons. Today his INR is 1.3. The patient held warfarin for 3 days and took 5 mg of oral vitamin K on Saturday, 11/9.  He has resumed on 1.25mg daily. Given his INR is low, we will have him take 3.75mg today, 2.5mg tomorrow and then 1.25 mg on Sunday. Follow up on Monday. He is to call the clinic once he receives his eliquis in the mail.

## 2024-11-15 NOTE — PATIENT INSTRUCTIONS
Your INR was low at 1.3. Please take 3.75mg tonight, 2.5mg tomorrow and then 1.25mg on Sunday. Follow up on Monday.  Please call once you receive you the eliquis in the mail.    If any questions arise do not hesitate to call us at [phone] M-F from 8:30am-4:30pm or at   [phone] after 5pm or on the weekends. Continue to monitor for any excess bleeding or bruising, especially for blood in your stool.

## 2024-11-16 DIAGNOSIS — C61 ADENOCARCINOMA OF PROSTATE (MULTI): ICD-10-CM

## 2024-11-16 DIAGNOSIS — R39.9 LOWER URINARY TRACT SYMPTOMS (LUTS): ICD-10-CM

## 2024-11-18 ENCOUNTER — TELEPHONE (OUTPATIENT)
Dept: PHARMACY | Facility: HOSPITAL | Age: 64
End: 2024-11-18

## 2024-11-18 ENCOUNTER — PHARMACY VISIT (OUTPATIENT)
Dept: PHARMACY | Facility: CLINIC | Age: 64
End: 2024-11-18
Payer: COMMERCIAL

## 2024-11-18 ENCOUNTER — APPOINTMENT (OUTPATIENT)
Dept: ORTHOPEDIC SURGERY | Facility: CLINIC | Age: 64
End: 2024-11-18
Payer: MEDICARE

## 2024-11-18 VITALS — HEIGHT: 70 IN | WEIGHT: 196 LBS | BODY MASS INDEX: 28.06 KG/M2

## 2024-11-18 DIAGNOSIS — G57.12 MERALGIA PARAESTHETICA, LEFT: ICD-10-CM

## 2024-11-18 DIAGNOSIS — M54.16 LEFT LUMBAR RADICULOPATHY: Primary | ICD-10-CM

## 2024-11-18 DIAGNOSIS — M43.10 ACQUIRED SPONDYLOLISTHESIS: ICD-10-CM

## 2024-11-18 DIAGNOSIS — M51.361 DEGENERATION OF INTERVERTEBRAL DISC OF LUMBAR REGION WITH LOWER EXTREMITY PAIN: ICD-10-CM

## 2024-11-18 PROCEDURE — 4010F ACE/ARB THERAPY RXD/TAKEN: CPT | Performed by: ORTHOPAEDIC SURGERY

## 2024-11-18 PROCEDURE — 3048F LDL-C <100 MG/DL: CPT | Performed by: ORTHOPAEDIC SURGERY

## 2024-11-18 PROCEDURE — 3008F BODY MASS INDEX DOCD: CPT | Performed by: ORTHOPAEDIC SURGERY

## 2024-11-18 PROCEDURE — 3060F POS MICROALBUMINURIA REV: CPT | Performed by: ORTHOPAEDIC SURGERY

## 2024-11-18 PROCEDURE — 1159F MED LIST DOCD IN RCRD: CPT | Performed by: ORTHOPAEDIC SURGERY

## 2024-11-18 PROCEDURE — 3044F HG A1C LEVEL LT 7.0%: CPT | Performed by: ORTHOPAEDIC SURGERY

## 2024-11-18 PROCEDURE — 1123F ACP DISCUSS/DSCN MKR DOCD: CPT | Performed by: ORTHOPAEDIC SURGERY

## 2024-11-18 PROCEDURE — 99213 OFFICE O/P EST LOW 20 MIN: CPT | Performed by: ORTHOPAEDIC SURGERY

## 2024-11-18 NOTE — PROGRESS NOTES
HPI:Kiko Petersen is a 64-year-old man who comes in with complaints of left-sided back pain and some pain that goes into the legs since September.  There is a little numbness and tingling in the thigh.  The symptoms have markedly improved and at this time are tolerable.      ROS:  Reviewed on EMR and patient intake sheet.    PMH/SH:  Reviewed on EMR and patient intake sheet.    Exam:  Physical Exam    Constitutional: Well appearing; no acute distress  Eyes: pupils are equal and round  Psych: normal affect  Respiratory: non-labored breathing  Cardiovascular: regular rate and rhythm  GI: non-distended abdomen  Musculoskeletal: no pain with range of motion of the hips bilaterally  Neurologic: [4+]/5 strength in the lower extremities bilaterally]; [negative] straight leg raise    Radiology:     MRI demonstrates an L4-5 grade 1 spondylolisthesis with a right paracentral disc herniation.  Moderate lateral recess narrowing on the right side.    Diagnosis:    Lumbar radiculopathy; L4-5 herniated nucleus pulposus; spondylolisthesis    Assessment and Plan:   64-year-old man with a resolving lumbar radiculopathy secondary to an L4-5 disc herniation in the setting of a pre-existing spondylolisthesis.  At this time his symptoms are quite tolerable.  I have encouraged continued nonoperative management.  I can see him back as needed.    The patient was in agreement with the plan. At the end of the visit today, the patient felt that all questions had been answered satisfactorily.  The patient was pleased with the visit and very appreciative for the care rendered.     Thank you very much for the kind referral.  It is a privilege, and a pleasure, to partner with you in the care of your patients.  I would be delighted to assist you with any further consultations as needed.          Carlos Araya MD    Chief of Spine Surgery, [facility]  Director of Spine Service, [facility]  [place]  , Department of Orthopaedics  [employer]  [address]  [place] [id number]  P: [phone]  [facility]    This note was dictated with voice recognition software.  It has not been proofread for grammatical errors, typographical mistakes or other semantic inconsistencies.

## 2024-11-18 NOTE — LETTER
November 18, 2024     Paolo Rogers MD  [address]  [facility], [address] [place] [id number]    Patient: Kiko Petersen   YOB: 1960   Date of Visit: 11/18/2024       Dear Dr. Paolo Rogers MD:    Thank you for referring Kiko Petersen to me for evaluation. Below are my notes for this consultation.  If you have questions, please do not hesitate to call me. I look forward to following your patient along with you.       Sincerely,     Carlos Araya MD      CC: No Recipients  ______________________________________________________________________________________    HPI:Kiko Petersen is a 64-year-old man who comes in with complaints of left-sided back pain and some pain that goes into the legs since September.  There is a little numbness and tingling in the thigh.  The symptoms have markedly improved and at this time are tolerable.      ROS:  Reviewed on EMR and patient intake sheet.    PMH/SH:  Reviewed on EMR and patient intake sheet.    Exam:  Physical Exam    Constitutional: Well appearing; no acute distress  Eyes: pupils are equal and round  Psych: normal affect  Respiratory: non-labored breathing  Cardiovascular: regular rate and rhythm  GI: non-distended abdomen  Musculoskeletal: no pain with range of motion of the hips bilaterally  Neurologic: [4+]/5 strength in the lower extremities bilaterally]; [negative] straight leg raise    Radiology:     MRI demonstrates an L4-5 grade 1 spondylolisthesis with a right paracentral disc herniation.  Moderate lateral recess narrowing on the right side.    Diagnosis:    Lumbar radiculopathy; L4-5 herniated nucleus pulposus; spondylolisthesis    Assessment and Plan:   64-year-old man with a resolving lumbar radiculopathy secondary to an L4-5 disc herniation in the setting of a pre-existing spondylolisthesis.  At this time his symptoms are quite tolerable.  I have encouraged continued nonoperative management.  I can see him back as  needed.    The patient was in agreement with the plan. At the end of the visit today, the patient felt that all questions had been answered satisfactorily.  The patient was pleased with the visit and very appreciative for the care rendered.     Thank you very much for the kind referral.  It is a privilege, and a pleasure, to partner with you in the care of your patients.  I would be delighted to assist you with any further consultations as needed.          Carlos Araya MD    Chief of Spine Surgery, [facility]  Director of Spine Service, [facility]  , Department of Orthopaedics  [facility] [employer]  [address] [id number]  P: [phone]  [facility]    This note was dictated with voice recognition software.  It has not been proofread for grammatical errors, typographical mistakes or other semantic inconsistencies.

## 2024-11-18 NOTE — TELEPHONE ENCOUNTER
Mr. Petersen called to report he received his eliquis in the mail. We discussed not to start the medication. He will hold his warfarin tonight and eat greens. We will check his INR tomorrow and as long as it is <2, he will begin the eliquis.   GI paged spoke with GI - may scope pt this morning Ree: pt received from Dr. Maradiaga at s/o; pt with afib on eliquis, with episode of hematemesis at home. HDS. Hg/hct stable. Pt ordered for kcentra, pantoprazole bolus/ drip, ns bolus. CTA c/a/p performed and results pending. Will continue to monitor. Ree: spoke w/ radiology regarding cta- no evidence of dissection/ retroperitoneal hematoma. Lactate elev to 4. No leukocytosis. + elev transaminases of unclear etiology. MICU consult requested for evaluation. GI consult pending. Will continue to monitor. Ree: spoke w/ radiology regarding cta- no evidence of dissection/ retroperitoneal hematoma; + gb wall enhancement with pericholecystic fluid. Lactate elev to 4. No leukocytosis. + elev transaminases. No abd pain/ tenderness. Will arrange for ruq sono to r/o cholecystitis and order abx. ICU consult requested for evaluation. GI consult pending. Will continue to monitor. Ree: pt noted to convert from afib to aflutter w/ variable block, rate in 60s. BP borderline. Repeat lactate slightly improved. LFTS continuing to remain elevated. Pt seen by ICU and to be admitted to [facility] for further management.

## 2024-11-19 ENCOUNTER — ANTICOAGULATION - WARFARIN VISIT (OUTPATIENT)
Dept: PHARMACY | Facility: HOSPITAL | Age: 64
End: 2024-11-19
Payer: MEDICARE

## 2024-11-19 ENCOUNTER — HOSPITAL ENCOUNTER (OUTPATIENT)
Dept: CARDIOLOGY | Facility: HOSPITAL | Age: 64
Discharge: HOME | End: 2024-11-19
Payer: MEDICARE

## 2024-11-19 ENCOUNTER — APPOINTMENT (OUTPATIENT)
Dept: PHARMACY | Facility: HOSPITAL | Age: 64
End: 2024-11-19
Payer: MEDICARE

## 2024-11-19 DIAGNOSIS — I71.21 ANEURYSM OF ASCENDING AORTA WITHOUT RUPTURE (CMS-HCC): ICD-10-CM

## 2024-11-19 DIAGNOSIS — I48.0 PAROXYSMAL ATRIAL FIBRILLATION (MULTI): ICD-10-CM

## 2024-11-19 DIAGNOSIS — I48.91 ATRIAL FIBRILLATION, UNSPECIFIED TYPE (MULTI): Primary | ICD-10-CM

## 2024-11-19 LAB
POC INR: 1.6 (ref 2–3)
POC PROTHROMBIN TIME: 18.9

## 2024-11-19 PROCEDURE — 93306 TTE W/DOPPLER COMPLETE: CPT

## 2024-11-19 PROCEDURE — 85610 PROTHROMBIN TIME: CPT | Mod: QW | Performed by: INTERNAL MEDICINE

## 2024-11-19 PROCEDURE — 99211 OFF/OP EST MAY X REQ PHY/QHP: CPT | Mod: 25

## 2024-11-19 PROCEDURE — 93306 TTE W/DOPPLER COMPLETE: CPT | Performed by: INTERNAL MEDICINE

## 2024-11-19 RX ORDER — TADALAFIL 5 MG/1
5 TABLET ORAL DAILY
Qty: 90 TABLET | Refills: 0 | Status: SHIPPED | OUTPATIENT
Start: 2024-11-19

## 2024-11-19 NOTE — PATIENT INSTRUCTIONS
Your INR was low at 1.6. Please start taking your eliquis every 12 hours.  Please dispose of your warfarin. Please refer to Dr. Whelan's office for any further questions/refills regarding your eliquis.    If any questions arise do not hesitate to call us at [phone] M-F from 8:30am-4:30pm or at   [phone] after 5pm or on the weekends. Continue to monitor for any excess bleeding or bruising, especially for blood in your stool.

## 2024-11-19 NOTE — PROGRESS NOTES
Mr. Petersen is a consult from Dr. Whelan to our clinic for the management of his warfarin therapy due to his history of atrial fibrillation. He has previously been taking Eliquis 5mg tablets. No signs or symptoms of bleeding today. No headaches. He has not been eating greens and held his warfarin as directed yesterday. He is transitioning back onto eliquis. We discussed how eliquis works and what to expect. Today his INR is 1.6. He will start eliquis tonight at 7:00pm. We discussed he will be taking this medication every 12 hours. Therefore he will be taking it at 7am and 7pm daily. No further questions or concerns. We discussed moving forward he will contact Dr. Whelan's office with any refill requests/questions about the eliquis.

## 2024-11-20 LAB
AORTIC VALVE MEAN GRADIENT: 4 MMHG
AORTIC VALVE PEAK VELOCITY: 1.5 M/S
AV PEAK GRADIENT: 9 MMHG
AVA (PEAK VEL): 2.64 CM2
AVA (VTI): 2.55 CM2
EJECTION FRACTION: 62 %
GLOBAL LONGITUDINAL STRAIN: 19.8 %
LEFT ATRIUM VOLUME AREA LENGTH INDEX BSA: 31.5 ML/M2
LEFT VENTRICLE INTERNAL DIMENSION DIASTOLE: 3.11 CM (ref 3.5–6)
LEFT VENTRICULAR OUTFLOW TRACT DIAMETER: 2.11 CM
MITRAL VALVE E/A RATIO: 1.37
MITRAL VALVE E/E' RATIO: 12.58
RIGHT VENTRICLE FREE WALL PEAK S': 18.35 CM/S
RIGHT VENTRICLE PEAK SYSTOLIC PRESSURE: 33.5 MMHG
TRICUSPID ANNULAR PLANE SYSTOLIC EXCURSION: 2.1 CM

## 2024-12-05 ENCOUNTER — APPOINTMENT (OUTPATIENT)
Dept: PHARMACY | Facility: HOSPITAL | Age: 64
End: 2024-12-05
Payer: MEDICARE

## 2024-12-05 DIAGNOSIS — I48.0 PAROXYSMAL ATRIAL FIBRILLATION (MULTI): ICD-10-CM

## 2024-12-05 NOTE — PROGRESS NOTES
I reviewed the progress note and agree with the resident's findings and plans as written. Case discussed with resident.    John Hdez  [phone]

## 2024-12-05 NOTE — PROGRESS NOTES
Pharmacist Clinic: Cardiology Management    Kiko Petersen is a 64 y.o. male was referred to Clinical Pharmacy Team for anticoagulation management.     Referring Provider: Brittany Montelongo APRN*    THIS IS A FOLLOW UP PATIENT APPOINTMENT. AT LAST VISIT ON 10/31/2024 WITH PHARMACIST (Delvin Lopes).    Appointment was completed by Kiko Petersen who was reached at mobile number.    REVIEW OF PAST APPNT (IF APPLICABLE):   Patient reports to be doing well. Previously was on Warfarin and wanted to switch back to the Eliquis due to the constant INR testing and how it affects his eating.  Encouraged patient to send over 1040 forms, W2s, social security forms, and letter of resignation in order to be approved for PAP.    Allergies Reviewed? Yes    Allergies   Allergen Reactions    Penicillins Other, Swelling, Unknown and Anaphylaxis     Cefazolin tolerated for post-op prophylaxis following ortho procedure    Erythromycin Diarrhea       Past Medical History:   Diagnosis Date    Diabetes mellitus (Multi)     Encounter for screening for malignant neoplasm of colon 07/30/2019    Encounter for screening colonoscopy    Hypertension     Unspecified cataract     Cataract, right eye    Urinary tract infection, site not specified 03/02/2020    UTI (urinary tract infection), bacterial       Current Outpatient Medications on File Prior to Visit   Medication Sig Dispense Refill    apixaban (Eliquis) 5 mg tablet Take 1 tablet (5 mg) by mouth 2 times a day. 180 tablet 3    furosemide (Lasix) 20 mg tablet Take 1 tablet (20 mg) by mouth once daily. (Patient taking differently: Take 1 tablet (20 mg) by mouth if needed.) 30 tablet 5    ketoconazole (NIZOral) 2 % cream Apply topically if needed.      lisinopril 20 mg tablet Take 1 tablet (20 mg) by mouth once daily. 90 tablet 0    metFORMIN (Glucophage) 500 mg tablet TAKE 1 TABLET BY MOUTH TWO TIMES A DAY WITH MEALS 180 tablet 0    metoprolol tartrate (Lopressor) 50 mg tablet TAKE 1 TABLET BY  "MOUTH EVERY 12 HOURS 180 tablet 3    NIFEdipine ER (NIFEdipine CC) 30 mg 24 hr tablet Take 1 tablet (30 mg) by mouth once daily. 90 tablet 0    sulfaSALAzine (Azulfidine) 500 mg tablet TAKE 2 TABLETS BY MOUTH THREE TIMES A  tablet 3    tadalafil (Cialis) 5 mg tablet TAKE 1 TABLET BY MOUTH EVERY DAY 90 tablet 0    tamsulosin (Flomax) 0.4 mg 24 hr capsule Take 2 capsules (0.8 mg) by mouth once daily. 180 capsule 3    tiZANidine (Zanaflex) 4 mg tablet Take 1 tablet (4 mg) by mouth every 6 hours if needed for muscle spasms. 30 tablet 1    [] folic acid (Folvite) 1 mg tablet TAKE 1 TABLET BY MOUTH ONCE DAILY (Patient not taking: Reported on 10/31/2024) 90 tablet 0    phytonadione (Vitamin K) 5 mg tablet Take 1 tablet (5mg) by mouth today (24) (Patient not taking: Reported on 2024) 1 tablet 0     No current facility-administered medications on file prior to visit.         RELEVANT LAB RESULTS:  Lab Results   Component Value Date    BILITOT 0.9 2024    CALCIUM 8.4 (L) 2024    CO2 26 2024    CL 99 2024    CREATININE 0.58 2024    GLUCOSE 182 (H) 2024    ALKPHOS 151 (H) 2024    K 4.4 2024    PROT 6.3 (L) 2024     (L) 2024    AST 44 (H) 2024    ALT 23 2024    BUN 7 2024    ANIONGAP 10 2024    MG 1.81 2022    PHOS 3.2 2023    ALBUMIN 3.5 2024    GFRF CANCELED 2023    GFRMALE >90 2023     Lab Results   Component Value Date    TRIG 52 09/10/2024    CHOL 145 09/10/2024    LDLCALC 25 09/10/2024    .5 09/10/2024     No results found for: \"BMCBC\", \"CBCDIF\"     PHARMACEUTICAL ASSESSMENT:    MEDICATION RECONCILIATION    Home Pharmacy Reviewed? Yes, describe: [facility] [place], [place]    Drug Interactions? No    Medication Documentation Review Audit       Reviewed by David Lopes (Pharmacist) on 24 at 1334      Medication Order Taking? Sig Documenting Provider Last Dose Status   apixaban " (Eliquis) 5 mg tablet [id number] Yes Take 1 tablet (5 mg) by mouth 2 times a day. Brittany Montelongo, APRN-CNP  Active   folic acid (Folvite) 1 mg tablet [id number] No TAKE 1 TABLET BY MOUTH ONCE DAILY   Patient not taking: Reported on 10/31/2024    Tye Tran MD Not Taking  24 2059   furosemide (Lasix) 20 mg tablet [id number] Yes Take 1 tablet (20 mg) by mouth once daily.   Patient taking differently: Take 1 tablet (20 mg) by mouth if needed.    Humphrey Leal MD  Active   ketoconazole (NIZOral) 2 % cream [id number] Yes Apply topically if needed. Historical Provider, MD Taking Active   lisinopril 20 mg tablet [id number] Yes Take 1 tablet (20 mg) by mouth once daily. Paolo Rogers MD Taking Active   metFORMIN (Glucophage) 500 mg tablet [id number] Yes TAKE 1 TABLET BY MOUTH TWO TIMES A DAY WITH MEALS Paolo Rogers MD Taking Active   metoprolol tartrate (Lopressor) 50 mg tablet [id number] Yes TAKE 1 TABLET BY MOUTH EVERY 12 HOURS Saurabh Grossman PA-C Taking Active   NIFEdipine ER (NIFEdipine CC) 30 mg 24 hr tablet [id number] Yes Take 1 tablet (30 mg) by mouth once daily. Paolo Rogers MD Taking Active   phytonadione (Vitamin K) 5 mg tablet [id number]  Take 1 tablet (5mg) by mouth today (24)   Patient not taking: Reported on 2024    Bashir Whelan MD  Active   sulfaSALAzine (Azulfidine) 500 mg tablet [id number] Yes TAKE 2 TABLETS BY MOUTH THREE TIMES A DAY Tye Tran MD Taking Active   tadalafil (Cialis) 5 mg tablet [id number] Yes TAKE 1 TABLET BY MOUTH EVERY DAY René Lyle MD  Active   tamsulosin (Flomax) 0.4 mg 24 hr capsule [id number] Yes Take 2 capsules (0.8 mg) by mouth once daily. René Lyle MD Taking Active   tiZANidine (Zanaflex) 4 mg tablet [id number] Yes Take 1 tablet (4 mg) by mouth every 6 hours if needed for muscle spasms. Paolo Rogers MD  Active                    DISEASE MANAGEMENT ASSESSMENT:   ANTICOAGULATION ASSESSMENT    The ASCVD Risk score (Radha VERA, et al., 2019)  failed to calculate for the following reasons:    The valid HDL cholesterol range is 20 to 100 mg/dL    DIAGNOSIS: treatment of nonvalvular atrial fibrilliation stroke and systemic embolism  - Patient is projected to be on anticoagulation indefinitely  - [id number] Score: [5] (only included if diagnosis is atrial fibrillation)   Age: [<65 (0)] [65-74 (+1)] [> 75 (+2)]: 1  Sex: [Male/Female (+1)]: 1  CHF history: [No/Yes(+1)]: 0  Hypertension history: [No/Yes(+1)]: 1  Stroke/TIA/thromboembolism history: [No/Yes(+2)]: 0  Vascular disease history (prior MI, peripheral artery disease, aortic plaque): [No/Yes(+1)]: 1  Diabetes history: [No/Yes(+1)]: 1    CURRENT PHARMACOTHERAPY:    Eliquis 5 mg BID    RELEVANT PAST MEDICAL HISTORY:   HTN, A-fib., Diabetes, Aneurysm, Cirrhosis    Affordability/Accessibility: Patient reports affordability since being enrolled in PAP  Adherence/Organization: Patient reports adhernce  Adverse Reactions: Patient denies any adverse effects  Recent Hospitalizations: Patient declines  Recent Falls/Trauma: Patient declines  Changes in Tobacco or Alcohol Intake:   Tobacco: Patient declines  Alcohol: Patient declines    EDUCATION/COUNSELING:   - Counseled patient on MOA, expectations, duration of therapy, contraindications, administration, and monitoring parameters  - Counseled patient of side effects that are indicative of bleeding such as dark tarry stool, unexplainable bruising, or vomiting up a coffee ground like substance      DISCUSSION/NOTES:   Patient reports that he's been doing well on Eliquis since the switch and has not experienced any bleeding or unexplained bruising.   Patient reports that he no longer feels like he needs to follow-up with clinical pharmacy team since being enrolled in PAP and his medication costs are under control now.     ASSESSMENT:    Assessment/Plan   Problem List Items Addressed This Visit       Paroxysmal atrial fibrillation (Multi)     Continue Eliquis 5 mg  BID  64 years old  Wt. 88.9kg  Scr 0.58              RECOMMENDATIONS/PLAN:    Continue Eliquis 5 mg BID    Last Appnt with Referring Provider: 10/2/2024  Next Appnt with Referring Provider: 12/12/2024  VAF/Application Expiration: Yes    Date: 11/13/2025  Type of Encounter: Rj Lopes PharmD  PGY-1 Resident    Verbal consent to manage patient's drug therapy was obtained from the patient . They were informed they may decline to participate or withdraw from participation in pharmacy services at any time.    Continue all meds under the continuation of care with the referring provider and clinical pharmacy team.

## 2024-12-09 ENCOUNTER — LAB (OUTPATIENT)
Dept: LAB | Facility: LAB | Age: 64
End: 2024-12-09
Payer: MEDICARE

## 2024-12-09 DIAGNOSIS — K51.90 ULCERATIVE COLITIS WITHOUT COMPLICATIONS, UNSPECIFIED LOCATION (MULTI): ICD-10-CM

## 2024-12-09 DIAGNOSIS — E83.110 HEREDITARY HEMOCHROMATOSIS (CMS-HCC): ICD-10-CM

## 2024-12-09 DIAGNOSIS — I10 ESSENTIAL HYPERTENSION: ICD-10-CM

## 2024-12-09 DIAGNOSIS — E11.9 TYPE 2 DIABETES MELLITUS WITHOUT COMPLICATION, WITHOUT LONG-TERM CURRENT USE OF INSULIN (MULTI): ICD-10-CM

## 2024-12-09 LAB
ALBUMIN SERPL BCP-MCNC: 3.9 G/DL (ref 3.4–5)
ALP SERPL-CCNC: 80 U/L (ref 33–136)
ALT SERPL W P-5'-P-CCNC: 15 U/L (ref 10–52)
ANION GAP SERPL CALC-SCNC: 14 MMOL/L (ref 10–20)
AST SERPL W P-5'-P-CCNC: 36 U/L (ref 9–39)
BASOPHILS # BLD AUTO: 0.05 X10*3/UL (ref 0–0.1)
BASOPHILS NFR BLD AUTO: 0.8 %
BILIRUB SERPL-MCNC: 1.1 MG/DL (ref 0–1.2)
BUN SERPL-MCNC: 7 MG/DL (ref 6–23)
CALCIUM SERPL-MCNC: 9.1 MG/DL (ref 8.6–10.3)
CHLORIDE SERPL-SCNC: 95 MMOL/L (ref 98–107)
CHOLEST SERPL-MCNC: 142 MG/DL (ref 0–199)
CHOLESTEROL/HDL RATIO: 1.9
CO2 SERPL-SCNC: 28 MMOL/L (ref 21–32)
CREAT SERPL-MCNC: 0.77 MG/DL (ref 0.5–1.3)
EGFRCR SERPLBLD CKD-EPI 2021: >90 ML/MIN/1.73M*2
EOSINOPHIL # BLD AUTO: 0.06 X10*3/UL (ref 0–0.7)
EOSINOPHIL NFR BLD AUTO: 0.9 %
ERYTHROCYTE [DISTWIDTH] IN BLOOD BY AUTOMATED COUNT: 14.1 % (ref 11.5–14.5)
EST. AVERAGE GLUCOSE BLD GHB EST-MCNC: 123 MG/DL
GLUCOSE SERPL-MCNC: 134 MG/DL (ref 74–99)
HBA1C MFR BLD: 5.9 %
HCT VFR BLD AUTO: 43.3 % (ref 41–52)
HDLC SERPL-MCNC: 75.9 MG/DL
HGB BLD-MCNC: 14 G/DL (ref 13.5–17.5)
IMM GRANULOCYTES # BLD AUTO: 0.02 X10*3/UL (ref 0–0.7)
IMM GRANULOCYTES NFR BLD AUTO: 0.3 % (ref 0–0.9)
LDLC SERPL CALC-MCNC: 52 MG/DL
LYMPHOCYTES # BLD AUTO: 0.91 X10*3/UL (ref 1.2–4.8)
LYMPHOCYTES NFR BLD AUTO: 14 %
MCH RBC QN AUTO: 29.5 PG (ref 26–34)
MCHC RBC AUTO-ENTMCNC: 32.3 G/DL (ref 32–36)
MCV RBC AUTO: 91 FL (ref 80–100)
MONOCYTES # BLD AUTO: 0.66 X10*3/UL (ref 0.1–1)
MONOCYTES NFR BLD AUTO: 10.2 %
NEUTROPHILS # BLD AUTO: 4.8 X10*3/UL (ref 1.2–7.7)
NEUTROPHILS NFR BLD AUTO: 73.8 %
NON HDL CHOLESTEROL: 66 MG/DL (ref 0–149)
NRBC BLD-RTO: 0 /100 WBCS (ref 0–0)
PLATELET # BLD AUTO: 187 X10*3/UL (ref 150–450)
POTASSIUM SERPL-SCNC: 4.4 MMOL/L (ref 3.5–5.3)
PROT SERPL-MCNC: 7.3 G/DL (ref 6.4–8.2)
RBC # BLD AUTO: 4.74 X10*6/UL (ref 4.5–5.9)
SODIUM SERPL-SCNC: 133 MMOL/L (ref 136–145)
TRIGL SERPL-MCNC: 71 MG/DL (ref 0–149)
VLDL: 14 MG/DL (ref 0–40)
WBC # BLD AUTO: 6.5 X10*3/UL (ref 4.4–11.3)

## 2024-12-09 PROCEDURE — 36415 COLL VENOUS BLD VENIPUNCTURE: CPT

## 2024-12-09 PROCEDURE — 83036 HEMOGLOBIN GLYCOSYLATED A1C: CPT

## 2024-12-09 PROCEDURE — 85025 COMPLETE CBC W/AUTO DIFF WBC: CPT

## 2024-12-09 PROCEDURE — 80061 LIPID PANEL: CPT

## 2024-12-09 PROCEDURE — 80053 COMPREHEN METABOLIC PANEL: CPT

## 2024-12-12 ENCOUNTER — APPOINTMENT (OUTPATIENT)
Dept: PRIMARY CARE | Facility: CLINIC | Age: 64
End: 2024-12-12
Payer: MEDICARE

## 2024-12-12 VITALS
OXYGEN SATURATION: 97 % | WEIGHT: 198.2 LBS | SYSTOLIC BLOOD PRESSURE: 130 MMHG | TEMPERATURE: 97.7 F | HEART RATE: 70 BPM | BODY MASS INDEX: 28.44 KG/M2 | DIASTOLIC BLOOD PRESSURE: 58 MMHG

## 2024-12-12 DIAGNOSIS — I10 ESSENTIAL HYPERTENSION: ICD-10-CM

## 2024-12-12 DIAGNOSIS — M51.361 DEGENERATION OF INTERVERTEBRAL DISC OF LUMBAR REGION WITH LOWER EXTREMITY PAIN: ICD-10-CM

## 2024-12-12 DIAGNOSIS — G47.33 OSA ON CPAP: ICD-10-CM

## 2024-12-12 DIAGNOSIS — Z85.46 HISTORY OF PROSTATE CANCER: ICD-10-CM

## 2024-12-12 DIAGNOSIS — E11.9 TYPE 2 DIABETES MELLITUS WITHOUT COMPLICATION, WITHOUT LONG-TERM CURRENT USE OF INSULIN (MULTI): Primary | ICD-10-CM

## 2024-12-12 DIAGNOSIS — G57.12 MERALGIA PARAESTHETICA, LEFT: ICD-10-CM

## 2024-12-12 DIAGNOSIS — E83.110 HEREDITARY HEMOCHROMATOSIS (CMS-HCC): ICD-10-CM

## 2024-12-12 PROCEDURE — 3075F SYST BP GE 130 - 139MM HG: CPT | Performed by: INTERNAL MEDICINE

## 2024-12-12 PROCEDURE — 1123F ACP DISCUSS/DSCN MKR DOCD: CPT | Performed by: INTERNAL MEDICINE

## 2024-12-12 PROCEDURE — G2211 COMPLEX E/M VISIT ADD ON: HCPCS | Performed by: INTERNAL MEDICINE

## 2024-12-12 PROCEDURE — 4010F ACE/ARB THERAPY RXD/TAKEN: CPT | Performed by: INTERNAL MEDICINE

## 2024-12-12 PROCEDURE — 1036F TOBACCO NON-USER: CPT | Performed by: INTERNAL MEDICINE

## 2024-12-12 PROCEDURE — 99214 OFFICE O/P EST MOD 30 MIN: CPT | Performed by: INTERNAL MEDICINE

## 2024-12-12 PROCEDURE — 3048F LDL-C <100 MG/DL: CPT | Performed by: INTERNAL MEDICINE

## 2024-12-12 PROCEDURE — 3078F DIAST BP <80 MM HG: CPT | Performed by: INTERNAL MEDICINE

## 2024-12-12 PROCEDURE — 1159F MED LIST DOCD IN RCRD: CPT | Performed by: INTERNAL MEDICINE

## 2024-12-12 PROCEDURE — 3060F POS MICROALBUMINURIA REV: CPT | Performed by: INTERNAL MEDICINE

## 2024-12-12 PROCEDURE — 3044F HG A1C LEVEL LT 7.0%: CPT | Performed by: INTERNAL MEDICINE

## 2024-12-12 RX ORDER — LISINOPRIL 20 MG/1
20 TABLET ORAL DAILY
Qty: 90 TABLET | Refills: 0 | Status: SHIPPED | OUTPATIENT
Start: 2024-12-12

## 2024-12-12 RX ORDER — NIFEDIPINE 30 MG/1
30 TABLET, FILM COATED, EXTENDED RELEASE ORAL DAILY
Qty: 90 TABLET | Refills: 0 | Status: SHIPPED | OUTPATIENT
Start: 2024-12-12

## 2024-12-12 RX ORDER — METFORMIN HYDROCHLORIDE 500 MG/1
500 TABLET ORAL
Qty: 180 TABLET | Refills: 0 | Status: SHIPPED | OUTPATIENT
Start: 2024-12-12

## 2024-12-12 RX ORDER — TIZANIDINE 4 MG/1
4 TABLET ORAL EVERY 6 HOURS PRN
Qty: 30 TABLET | Refills: 1 | Status: SHIPPED | OUTPATIENT
Start: 2024-12-12

## 2024-12-12 ASSESSMENT — ENCOUNTER SYMPTOMS
ENDOCRINE NEGATIVE: 1
HEMATOLOGIC/LYMPHATIC NEGATIVE: 1
EYES NEGATIVE: 1
MUSCULOSKELETAL NEGATIVE: 1
PSYCHIATRIC NEGATIVE: 1
CONSTITUTIONAL NEGATIVE: 1
NEUROLOGICAL NEGATIVE: 1
CARDIOVASCULAR NEGATIVE: 1
ALLERGIC/IMMUNOLOGIC NEGATIVE: 1
GASTROINTESTINAL NEGATIVE: 1
RESPIRATORY NEGATIVE: 1

## 2024-12-12 NOTE — PROGRESS NOTES
Subjective   Patient ID: Kiko Petersen is a 64 y.o. male who presents for 3 month follow up.  Patient presents in follow up regarding hypertension.  Blood pressure has been well controlled on current therapy.  No adverse effects of medication reported.  Patient denies chest pain, palpitations, shortness of breath, orthopnea, fatigue or edema.    Patient presents in follow up re:  Type 2 diabetes.  Patient has been compliant with medical therapy as prescribed and mostly compliant with diet and exercise recommendations.  HgbA1c has been maintained at goal level.  No hypoglycemia reported and no other associated complaints.          Review of Systems   Constitutional: Negative.    HENT: Negative.     Eyes: Negative.    Respiratory: Negative.     Cardiovascular: Negative.    Gastrointestinal: Negative.    Endocrine: Negative.    Genitourinary: Negative.    Musculoskeletal: Negative.    Skin: Negative.    Allergic/Immunologic: Negative.    Neurological: Negative.    Hematological: Negative.    Psychiatric/Behavioral: Negative.         Objective     Blood pressure 130/58, pulse 70, temperature 36.5 °C (97.7 °F), temperature source Temporal, weight 89.9 kg (198 lb 3.2 oz), SpO2 97%.   Physical Exam  Constitutional:       Appearance: Normal appearance.   Neck:      Vascular: No carotid bruit.   Cardiovascular:      Rate and Rhythm: Normal rate. Rhythm irregular.      Pulses: Normal pulses.      Heart sounds: Normal heart sounds. No murmur heard.  Pulmonary:      Effort: Pulmonary effort is normal.      Breath sounds: Normal breath sounds. No wheezing or rales.   Abdominal:      General: Abdomen is flat. There is no distension.      Palpations: Abdomen is soft.      Tenderness: There is no abdominal tenderness.   Musculoskeletal:         General: Normal range of motion.      Cervical back: Normal range of motion and neck supple.   Skin:     General: Skin is warm and dry.   Neurological:      General: No focal deficit  present.      Mental Status: He is alert and oriented to person, place, and time.   Psychiatric:         Mood and Affect: Mood normal.         Behavior: Behavior normal.         Assessment/Plan   Problem List Items Addressed This Visit       Type 2 diabetes mellitus without complication, without long-term current use of insulin (Multi) - Primary    Relevant Medications    metFORMIN (Glucophage) 500 mg tablet    Other Relevant Orders    Hemoglobin A1C    Lipid Panel    BENNY on CPAP    Hereditary hemochromatosis (CMS-HCC)    Essential hypertension    Relevant Medications    lisinopril 20 mg tablet    NIFEdipine ER (NIFEdipine CC) 30 mg 24 hr tablet    Other Relevant Orders    CBC and Auto Differential    Comprehensive Metabolic Panel    Meralgia paraesthetica, left    Relevant Medications    tiZANidine (Zanaflex) 4 mg tablet    History of prostate cancer     Other Visit Diagnoses       Degeneration of intervertebral disc of lumbar region with lower extremity pain        Relevant Medications    tiZANidine (Zanaflex) 4 mg tablet            Sugars well controlled overall with A1c at goal.  Will continue present medical therapy and follow up.  BP well controlled on current therapy.  Will continue present therapy and follow.  Edema improved with reduction in nifedipine dose.  Lipid levels are excellent without intervention and cardiac eval negative other than the hx of a-fib, so absolutely no indication for statin therapy.  He continues to follow with Cardiology re:  hx of a-fib.  He is s/p ablation and doing very well.  He follows with GI re:  ulcerative colitis and hepatic pathology.    Pt. continues to use CPAP nightly and is tolerating well.  Advised to continue treatment and will continue to follow clinically.   He is doing well s/p left hip arthroplasty.  He had colonoscopy in 8/2022.  He follows with Dr. Lyle re:  hx of prostate cancer and it is noted that there has been a significant rise in PSA.  He also follows  with Hepatology, Dr. Ciaran Patel, for chronic liver issues.  See above.        Follow up in 3 months  Lab to be drawn 1 week prior to next office visit.

## 2024-12-21 DIAGNOSIS — K70.31 ALCOHOLIC CIRRHOSIS OF LIVER WITH ASCITES (MULTI): ICD-10-CM

## 2024-12-21 DIAGNOSIS — C61 ADENOCARCINOMA OF PROSTATE (MULTI): ICD-10-CM

## 2024-12-21 DIAGNOSIS — R39.9 LOWER URINARY TRACT SYMPTOMS (LUTS): ICD-10-CM

## 2024-12-23 RX ORDER — TADALAFIL 5 MG/1
5 TABLET ORAL DAILY
Qty: 90 TABLET | Refills: 0 | OUTPATIENT
Start: 2024-12-23

## 2024-12-24 RX ORDER — FUROSEMIDE 20 MG/1
20 TABLET ORAL DAILY
Qty: 90 TABLET | Refills: 1 | Status: SHIPPED | OUTPATIENT
Start: 2024-12-24

## 2025-02-24 PROCEDURE — RXMED WILLOW AMBULATORY MEDICATION CHARGE

## 2025-02-25 ENCOUNTER — PHARMACY VISIT (OUTPATIENT)
Dept: PHARMACY | Facility: CLINIC | Age: 65
End: 2025-02-25
Payer: COMMERCIAL

## 2025-02-27 RX ORDER — LEVOFLOXACIN 750 MG/1
750 TABLET ORAL ONCE
Status: COMPLETED | OUTPATIENT
Start: 2025-02-28 | End: 2025-02-28

## 2025-02-28 ENCOUNTER — PROCEDURE VISIT (OUTPATIENT)
Dept: UROLOGY | Facility: HOSPITAL | Age: 65
End: 2025-02-28
Payer: MEDICARE

## 2025-02-28 VITALS — HEART RATE: 80 BPM | TEMPERATURE: 97.6 F | DIASTOLIC BLOOD PRESSURE: 70 MMHG | SYSTOLIC BLOOD PRESSURE: 138 MMHG

## 2025-02-28 DIAGNOSIS — Z79.2 PROPHYLACTIC ANTIBIOTIC: ICD-10-CM

## 2025-02-28 DIAGNOSIS — C61 ADENOCARCINOMA OF PROSTATE (MULTI): ICD-10-CM

## 2025-02-28 PROCEDURE — 76942 ECHO GUIDE FOR BIOPSY: CPT | Performed by: UROLOGY

## 2025-02-28 PROCEDURE — 2500000001 HC RX 250 WO HCPCS SELF ADMINISTERED DRUGS (ALT 637 FOR MEDICARE OP): Performed by: UROLOGY

## 2025-02-28 PROCEDURE — 55700 HC BIOPSY OF PROSTATE,NEEDLE/PUNCH: CPT | Performed by: UROLOGY

## 2025-02-28 PROCEDURE — 76872 US TRANSRECTAL: CPT | Performed by: UROLOGY

## 2025-02-28 PROCEDURE — 55700 PR PROSTATE NEEDLE BIOPSY ANY APPROACH: CPT | Performed by: UROLOGY

## 2025-02-28 RX ADMIN — LEVOFLOXACIN 750 MG: 750 TABLET, FILM COATED ORAL at 12:20

## 2025-02-28 ASSESSMENT — PATIENT HEALTH QUESTIONNAIRE - PHQ9
SUM OF ALL RESPONSES TO PHQ9 QUESTIONS 1 AND 2: 0
1. LITTLE INTEREST OR PLEASURE IN DOING THINGS: NOT AT ALL
2. FEELING DOWN, DEPRESSED OR HOPELESS: NOT AT ALL

## 2025-02-28 NOTE — PROGRESS NOTES
FUV    Last seen - 9/13/24     HISTORY OF PRESENT ILLNESS:   Kiko Petersen is a 64 y.o. male who is being seen today for prostate biopsy  - history of BPH  - elevated PSA with PI-RADS 3 on MRI 03/20/19  - [place] 3+3=6 prostate cancer Dx on 4/19/19, with PI-RADS 3 on MRI 03/27/2020 and ASAP on restaging on prostate biopsy 05/07/2020  -MRI of prostate on 9/3/24 demonstrated  Overall stable appearance of a PI-RADS 3 lesion  - On active surveillance  PAST MEDICAL HISTORY:  Past Medical History:   Diagnosis Date    Diabetes mellitus (Multi)     Encounter for screening for malignant neoplasm of colon 07/30/2019    Encounter for screening colonoscopy    Hypertension     Unspecified cataract     Cataract, right eye    Urinary tract infection, site not specified 03/02/2020    UTI (urinary tract infection), bacterial       PAST SURGICAL HISTORY:  Past Surgical History:   Procedure Laterality Date    CARDIAC ELECTROPHYSIOLOGY PROCEDURE N/A 3/12/2024    Procedure: Ablation A-Fib;  Surgeon: Jaylen Guerrero MD;  Location: [facility] Cardiac [facility] Lab;  Service: Electrophysiology;  Laterality: N/A;  atrial fibrillation ablation, CARTO, general anesthesia, hold eliquis morning of procedure    EXCISION / REPAIR HYDROCELE PEDIATRIC      HERNIA REPAIR  10/14/2015    Inguinal Hernia Repair    REPLACEMENT TOTAL HIP LATERAL POSITION Left         ALLERGIES:   Allergies   Allergen Reactions    Penicillins Other, Swelling, Unknown and Anaphylaxis     Cefazolin tolerated for post-op prophylaxis following ortho procedure    Erythromycin Diarrhea        MEDICATIONS:   Current Outpatient Medications   Medication Instructions    Eliquis 5 mg, oral, 2 times daily    furosemide (LASIX) 20 mg, oral, Daily    ketoconazole (NIZOral) 2 % cream As needed    lisinopril 20 mg, oral, Daily    metFORMIN (Glucophage) 500 mg tablet TAKE 1 TABLET BY MOUTH TWO TIMES A DAY WITH MEALS    metoprolol tartrate (Lopressor) 50 mg tablet TAKE 1 TABLET BY  "MOUTH EVERY 12 HOURS    NIFEdipine ER (ADALAT CC) 30 mg, oral, Daily    phytonadione (Vitamin K) 5 mg tablet Take 1 tablet (5mg) by mouth today (11/8/24)    sulfaSALAzine (Azulfidine) 500 mg tablet TAKE 2 TABLETS BY MOUTH THREE TIMES A DAY    tadalafil (CIALIS) 5 mg, oral, Daily    tamsulosin (FLOMAX) 0.8 mg, oral, Daily    tiZANidine (ZANAFLEX) 4 mg, oral, Every 6 hours PRN        PHYSICAL EXAM:  Blood pressure 138/70, pulse 80, temperature 36.4 °C (97.6 °F).  Constitutional: Patient appears well-developed and well-nourished. No distress.    Pulmonary/Chest: Effort normal. No respiratory distress.   Abdominal: Soft, ND NT  : WNL  Musculoskeletal: Normal range of motion.    Neurological: Alert and oriented to person, place, and time.  Psychiatric: Normal mood and affect. Behavior is normal. Thought content normal.      Labs:  No results found for: \"TESTOSTERONE\"  Lab Results   Component Value Date    PSA 12.56 (H) 09/10/2024     Lab Results   Component Value Date    PSA 12.56 (H) 09/10/2024    PSA 10.66 (H) 08/03/2024    PSA 7.80 (H) 01/29/2024    PSA 5.18 (H) 12/10/2022    PSA 4.10 (H) 05/28/2022    PSA 5.31 (H) 06/05/2021         No components found for: \"CBC\"  Lab Results   Component Value Date    CREATININE 0.77 12/09/2024     No components found for: \"TESTOTMS\"  No results found for: \"TESTF\"    Imaging:  - MRI of prostate on 9/3/24 demonstrated  Overall stable appearance of a PI-RADS 3 lesion in the left lateral transitional zone in the mid to apical prostate as compared to the 2022 MRI. No new lesion is identified. No pelvic lymphadenopathy. No focal osseous metastatic disease.    Discussion:  Doing well, no complaints.  He is on Tamsulosin 0.8mg and Cialis 5mg. Fusion Biopsy with TRUS of ROIs with standard biopsy was completed today without complications and he tolerated the procedure well. Transrectal diagnostic ultrasound guidance for needle biopsy of the prostate was used. Specimen obtained from 1 MADISON's, " Right Base, Right Mid, Right Brandon, Left Base, Left Mid and Left Brandon. Post-biopsy care instructions were provided to patient. Will follow up in 2 weeks to discuss biopsy results.  Prostate volume: 36.83 g    Assessment:      1. Adenocarcinoma of prostate (Multi)  Surgical Pathology Exam    Surgical Pathology Exam      2. Prophylactic antibiotic  levoFLOXacin (Levaquin) tablet 750 mg            Kiko Petersen is a 64 y.o. male here for FUV     Plan:   1)  follow up 2 weeks after to discuss biopsy results     All questions and concerns were addressed. Patient verbalizes understanding and has no other questions at this time.     Scribe Attestation:  By signing my name below, ITiffanie Scribe   attest that this documentation has been prepared under the direction and in the presence of René Lyle MD.

## 2025-03-08 LAB
ALBUMIN SERPL-MCNC: 3.9 G/DL (ref 3.6–5.1)
ALP SERPL-CCNC: 95 U/L (ref 35–144)
ALT SERPL-CCNC: 34 U/L (ref 9–46)
ANION GAP SERPL CALCULATED.4IONS-SCNC: 12 MMOL/L (CALC) (ref 7–17)
AST SERPL-CCNC: 93 U/L (ref 10–35)
BASOPHILS # BLD AUTO: 31 CELLS/UL (ref 0–200)
BASOPHILS NFR BLD AUTO: 0.9 %
BILIRUB SERPL-MCNC: 0.7 MG/DL (ref 0.2–1.2)
BUN SERPL-MCNC: 5 MG/DL (ref 7–25)
CALCIUM SERPL-MCNC: 8.8 MG/DL (ref 8.6–10.3)
CHLORIDE SERPL-SCNC: 88 MMOL/L (ref 98–110)
CHOLEST SERPL-MCNC: 148 MG/DL
CHOLEST/HDLC SERPL: 1.6 (CALC)
CO2 SERPL-SCNC: 25 MMOL/L (ref 20–32)
CREAT SERPL-MCNC: 0.65 MG/DL (ref 0.7–1.35)
EGFRCR SERPLBLD CKD-EPI 2021: 104 ML/MIN/1.73M2
EOSINOPHIL # BLD AUTO: 82 CELLS/UL (ref 15–500)
EOSINOPHIL NFR BLD AUTO: 2.4 %
ERYTHROCYTE [DISTWIDTH] IN BLOOD BY AUTOMATED COUNT: 13.8 % (ref 11–15)
EST. AVERAGE GLUCOSE BLD GHB EST-MCNC: 120 MG/DL
EST. AVERAGE GLUCOSE BLD GHB EST-SCNC: 6.6 MMOL/L
GLUCOSE SERPL-MCNC: 108 MG/DL (ref 65–99)
HBA1C MFR BLD: 5.8 % OF TOTAL HGB
HCT VFR BLD AUTO: 39.5 % (ref 38.5–50)
HDLC SERPL-MCNC: 93 MG/DL
HGB BLD-MCNC: 13.9 G/DL (ref 13.2–17.1)
LDLC SERPL CALC-MCNC: 40 MG/DL (CALC)
LYMPHOCYTES # BLD AUTO: 1159 CELLS/UL (ref 850–3900)
LYMPHOCYTES NFR BLD AUTO: 34.1 %
MCH RBC QN AUTO: 33.7 PG (ref 27–33)
MCHC RBC AUTO-ENTMCNC: 35.2 G/DL (ref 32–36)
MCV RBC AUTO: 95.6 FL (ref 80–100)
MONOCYTES # BLD AUTO: 493 CELLS/UL (ref 200–950)
MONOCYTES NFR BLD AUTO: 14.5 %
NEUTROPHILS # BLD AUTO: 1635 CELLS/UL (ref 1500–7800)
NEUTROPHILS NFR BLD AUTO: 48.1 %
NONHDLC SERPL-MCNC: 55 MG/DL (CALC)
PLATELET # BLD AUTO: 162 THOUSAND/UL (ref 140–400)
PMV BLD REES-ECKER: 9.8 FL (ref 7.5–12.5)
POTASSIUM SERPL-SCNC: 4.8 MMOL/L (ref 3.5–5.3)
PROT SERPL-MCNC: 6.9 G/DL (ref 6.1–8.1)
RBC # BLD AUTO: 4.13 MILLION/UL (ref 4.2–5.8)
SODIUM SERPL-SCNC: 125 MMOL/L (ref 135–146)
TRIGL SERPL-MCNC: 74 MG/DL
WBC # BLD AUTO: 3.4 THOUSAND/UL (ref 3.8–10.8)

## 2025-03-11 ENCOUNTER — APPOINTMENT (OUTPATIENT)
Dept: PRIMARY CARE | Facility: CLINIC | Age: 65
End: 2025-03-11
Payer: MEDICARE

## 2025-03-11 VITALS
HEART RATE: 65 BPM | DIASTOLIC BLOOD PRESSURE: 58 MMHG | WEIGHT: 189.2 LBS | OXYGEN SATURATION: 100 % | SYSTOLIC BLOOD PRESSURE: 118 MMHG | TEMPERATURE: 98.2 F | BODY MASS INDEX: 27.15 KG/M2

## 2025-03-11 DIAGNOSIS — K51.90 ULCERATIVE COLITIS WITHOUT COMPLICATIONS, UNSPECIFIED LOCATION (MULTI): ICD-10-CM

## 2025-03-11 DIAGNOSIS — E11.9 TYPE 2 DIABETES MELLITUS WITHOUT COMPLICATION, WITHOUT LONG-TERM CURRENT USE OF INSULIN (MULTI): ICD-10-CM

## 2025-03-11 DIAGNOSIS — I10 ESSENTIAL HYPERTENSION: ICD-10-CM

## 2025-03-11 DIAGNOSIS — M51.361 DEGENERATION OF INTERVERTEBRAL DISC OF LUMBAR REGION WITH LOWER EXTREMITY PAIN: ICD-10-CM

## 2025-03-11 DIAGNOSIS — I48.0 PAROXYSMAL ATRIAL FIBRILLATION (MULTI): ICD-10-CM

## 2025-03-11 DIAGNOSIS — J31.0 GUSTATORY RHINITIS: ICD-10-CM

## 2025-03-11 DIAGNOSIS — K76.6 PORTAL HYPERTENSION (MULTI): ICD-10-CM

## 2025-03-11 DIAGNOSIS — G57.12 MERALGIA PARAESTHETICA, LEFT: ICD-10-CM

## 2025-03-11 DIAGNOSIS — G47.33 OSA ON CPAP: ICD-10-CM

## 2025-03-11 DIAGNOSIS — Z85.46 HISTORY OF PROSTATE CANCER: ICD-10-CM

## 2025-03-11 DIAGNOSIS — Z00.00 MEDICARE ANNUAL WELLNESS VISIT, SUBSEQUENT: Primary | ICD-10-CM

## 2025-03-11 PROBLEM — C61 ADENOCARCINOMA OF PROSTATE (MULTI): Status: RESOLVED | Noted: 2023-04-12 | Resolved: 2025-03-11

## 2025-03-11 PROCEDURE — G0444 DEPRESSION SCREEN ANNUAL: HCPCS | Performed by: INTERNAL MEDICINE

## 2025-03-11 PROCEDURE — 3078F DIAST BP <80 MM HG: CPT | Performed by: INTERNAL MEDICINE

## 2025-03-11 PROCEDURE — G0439 PPPS, SUBSEQ VISIT: HCPCS | Performed by: INTERNAL MEDICINE

## 2025-03-11 PROCEDURE — 3074F SYST BP LT 130 MM HG: CPT | Performed by: INTERNAL MEDICINE

## 2025-03-11 PROCEDURE — 1159F MED LIST DOCD IN RCRD: CPT | Performed by: INTERNAL MEDICINE

## 2025-03-11 PROCEDURE — 4010F ACE/ARB THERAPY RXD/TAKEN: CPT | Performed by: INTERNAL MEDICINE

## 2025-03-11 PROCEDURE — 1124F ACP DISCUSS-NO DSCNMKR DOCD: CPT | Performed by: INTERNAL MEDICINE

## 2025-03-11 PROCEDURE — 1170F FXNL STATUS ASSESSED: CPT | Performed by: INTERNAL MEDICINE

## 2025-03-11 PROCEDURE — 99397 PER PM REEVAL EST PAT 65+ YR: CPT | Performed by: INTERNAL MEDICINE

## 2025-03-11 PROCEDURE — G0442 ANNUAL ALCOHOL SCREEN 15 MIN: HCPCS | Performed by: INTERNAL MEDICINE

## 2025-03-11 PROCEDURE — 1036F TOBACCO NON-USER: CPT | Performed by: INTERNAL MEDICINE

## 2025-03-11 PROCEDURE — 99214 OFFICE O/P EST MOD 30 MIN: CPT | Performed by: INTERNAL MEDICINE

## 2025-03-11 RX ORDER — FLUTICASONE PROPIONATE 50 MCG
2 SPRAY, SUSPENSION (ML) NASAL DAILY
Qty: 48 G | Refills: 1 | Status: SHIPPED | OUTPATIENT
Start: 2025-03-11

## 2025-03-11 RX ORDER — TIZANIDINE 4 MG/1
4 TABLET ORAL EVERY 6 HOURS PRN
Qty: 30 TABLET | Refills: 1 | Status: SHIPPED | OUTPATIENT
Start: 2025-03-11

## 2025-03-11 RX ORDER — LISINOPRIL 20 MG/1
20 TABLET ORAL DAILY
Qty: 90 TABLET | Refills: 0 | Status: SHIPPED | OUTPATIENT
Start: 2025-03-11

## 2025-03-11 RX ORDER — METFORMIN HYDROCHLORIDE 500 MG/1
500 TABLET ORAL
Qty: 180 TABLET | Refills: 0 | Status: SHIPPED | OUTPATIENT
Start: 2025-03-11

## 2025-03-11 ASSESSMENT — ENCOUNTER SYMPTOMS
PSYCHIATRIC NEGATIVE: 1
HEMATOLOGIC/LYMPHATIC NEGATIVE: 1
ENDOCRINE NEGATIVE: 1
RESPIRATORY NEGATIVE: 1
CONSTITUTIONAL NEGATIVE: 1
ALLERGIC/IMMUNOLOGIC NEGATIVE: 1
OCCASIONAL FEELINGS OF UNSTEADINESS: 1
GASTROINTESTINAL NEGATIVE: 1
EYES NEGATIVE: 1
DEPRESSION: 0
CARDIOVASCULAR NEGATIVE: 1
MUSCULOSKELETAL NEGATIVE: 1
NEUROLOGICAL NEGATIVE: 1
LOSS OF SENSATION IN FEET: 1

## 2025-03-11 ASSESSMENT — ACTIVITIES OF DAILY LIVING (ADL)
BATHING: INDEPENDENT
TAKING_MEDICATION: INDEPENDENT
MANAGING_FINANCES: INDEPENDENT
GROCERY_SHOPPING: INDEPENDENT
DRESSING: INDEPENDENT
DOING_HOUSEWORK: INDEPENDENT

## 2025-03-11 NOTE — PROGRESS NOTES
Subjective   Patient ID: Kiko Petersen is a 64 y.o. male who presents for 3 month follow up and Medicare Annual Wellness Visit Subsequent.  Patient presents in follow up regarding hypertension.  Blood pressure has been well controlled on current therapy.  No adverse effects of medication reported.  Patient denies chest pain, palpitations, shortness of breath, orthopnea, fatigue or edema.    Patient presents in follow up re:  Type 2 diabetes.  Patient has been compliant with medical therapy as prescribed and mostly compliant with diet and exercise recommendations.  HgbA1c has been maintained at goal level.  No hypoglycemia reported and no other associated complaints.          Review of Systems   Constitutional: Negative.    HENT: Negative.     Eyes: Negative.    Respiratory: Negative.     Cardiovascular: Negative.    Gastrointestinal: Negative.    Endocrine: Negative.    Genitourinary: Negative.    Musculoskeletal: Negative.    Skin: Negative.    Allergic/Immunologic: Negative.    Neurological: Negative.    Hematological: Negative.    Psychiatric/Behavioral: Negative.         Objective     Blood pressure 118/58, pulse 65, temperature 36.8 °C (98.2 °F), temperature source Temporal, weight 85.8 kg (189 lb 3.2 oz), SpO2 100%.   Physical Exam  Constitutional:       Appearance: Normal appearance.   HENT:      Head: Normocephalic and atraumatic.      Right Ear: External ear normal.      Left Ear: External ear normal.      Nose: Nose normal.      Mouth/Throat:      Mouth: Mucous membranes are moist.   Eyes:      Conjunctiva/sclera: Conjunctivae normal.      Pupils: Pupils are equal, round, and reactive to light.   Neck:      Vascular: No carotid bruit.   Cardiovascular:      Rate and Rhythm: Normal rate. Rhythm irregular.      Pulses: Normal pulses.      Heart sounds: Normal heart sounds. No murmur heard.  Pulmonary:      Effort: Pulmonary effort is normal.      Breath sounds: Normal breath sounds. No wheezing or rales.    Abdominal:      General: Abdomen is flat. There is no distension.      Palpations: Abdomen is soft.      Tenderness: There is no abdominal tenderness. There is no right CVA tenderness or left CVA tenderness.   Musculoskeletal:         General: Normal range of motion.      Cervical back: Normal range of motion and neck supple.   Skin:     General: Skin is warm and dry.   Neurological:      General: No focal deficit present.      Mental Status: He is alert and oriented to person, place, and time.   Psychiatric:         Mood and Affect: Mood normal.         Behavior: Behavior normal.         Assessment/Plan   Problem List Items Addressed This Visit       Ulcerative colitis    Type 2 diabetes mellitus without complication, without long-term current use of insulin (Multi)    Relevant Medications    metFORMIN (Glucophage) 500 mg tablet    Other Relevant Orders    Comprehensive Metabolic Panel    Lipid Panel    Hemoglobin A1C    Albumin-Creatinine Ratio, Urine Random    BENNY on CPAP    Essential hypertension    Relevant Medications    lisinopril 20 mg tablet    Other Relevant Orders    Comprehensive Metabolic Panel    Paroxysmal atrial fibrillation (Multi)    Portal hypertension (Multi)    Meralgia paraesthetica, left    Relevant Medications    tiZANidine (Zanaflex) 4 mg tablet    History of prostate cancer     Other Visit Diagnoses       Medicare annual wellness visit, subsequent    -  Primary    Degeneration of intervertebral disc of lumbar region with lower extremity pain        Relevant Medications    tiZANidine (Zanaflex) 4 mg tablet    Gustatory rhinitis        Relevant Medications    fluticasone (Flonase) 50 mcg/actuation nasal spray          Medicare annual wellness completed with no new findings or issues identified.  He is up to date on all appropriate preventive measures.  Patient does not have advanced care planning in place.  This is discussed and form is given today  Depression screen is completed utilizing  PHQ-2 with score of zero (0).  Alcohol screen is completed with no issues identified.  Physical exam completed as documented with no new findings.   Sugars well controlled overall with A1c at goal.  Will continue present medical therapy and follow up.  Renal function is excellent and he is on ACE-I therapy.  BP well controlled on current therapy.  Will continue present therapy and follow.  Edema resolved with discontinuation of nifedipine.  Lipid levels are excellent without intervention and cardiac eval negative other than the hx of a-fib, so absolutely no indication for statin therapy.  He continues to follow with Cardiology re:  hx of a-fib.  He is s/p ablation and doing very well.  He follows with GI re:  ulcerative colitis and hepatic pathology.    Pt. continues to use CPAP nightly and is tolerating well.  Advised to continue treatment and will continue to follow clinically.   He is doing well s/p left hip arthroplasty.  He had colonoscopy in 8/2022.  He follows with Dr. Lyle re:  hx of prostate cancer.   He also follows with Hepatology, Dr. Ciaran Patel, for chronic liver issues.  See above.    He has persistent problems with gustatory rhinitis.  He states he can not afford OTC Flonase.  Will formally Rx to see if payer will cover.      Follow up in 3 months  Lab to be drawn 1 week prior to next office visit.

## 2025-03-12 ENCOUNTER — APPOINTMENT (OUTPATIENT)
Dept: PRIMARY CARE | Facility: CLINIC | Age: 65
End: 2025-03-12
Payer: MEDICARE

## 2025-03-13 ENCOUNTER — APPOINTMENT (OUTPATIENT)
Dept: UROLOGY | Facility: HOSPITAL | Age: 65
End: 2025-03-13
Payer: MEDICARE

## 2025-03-14 LAB
LAB AP ASR DISCLAIMER: NORMAL
LABORATORY COMMENT REPORT: NORMAL
PATH REPORT.FINAL DX SPEC: NORMAL
PATH REPORT.GROSS SPEC: NORMAL
PATH REPORT.RELEVANT HX SPEC: NORMAL
PATH REPORT.TOTAL CANCER: NORMAL

## 2025-03-15 ASSESSMENT — EXTERNAL EXAM - RIGHT EYE: OD_EXAM: NORMAL

## 2025-03-15 ASSESSMENT — SLIT LAMP EXAM - LIDS
COMMENTS: GOOD POSITION, DERMATOCHALASIS
COMMENTS: GOOD POSITION, DERMATOCHALASIS

## 2025-03-15 ASSESSMENT — EXTERNAL EXAM - LEFT EYE: OS_EXAM: NORMAL

## 2025-03-15 ASSESSMENT — CUP TO DISC RATIO
OD_RATIO: .25
OS_RATIO: .25

## 2025-03-16 NOTE — PROGRESS NOTES
LOV 10/15/21    Diabetes [id number]  -HbA1c= 5.8 (3/7/25). No diabetic retinopathy seen on exam. Continue close monitoring of blood glucose, blood pressure, and cholesterol. Plan for annual dilated eye exam.    Combined form of age-related cataract, right eyeH25.811  Combined form of age-related cataract, left eyeH25.812  -Not visually significant at this time. Monitor.    [id number]  [id number]  -Continue OTC reading glasses  -Has natural monovision and history of wearing glasses for driving at night in the rain. Denies difficulty with distance vision (uncorrected).   -Refraction performed today for diagnostic purposes only and without specific intent to dispense Rx. Glasses Rx not dispensed today.       No history of intraocular surgery/refractive surgery.   No FH of AMD  (+)FH glaucoma - MGM

## 2025-03-19 ENCOUNTER — APPOINTMENT (OUTPATIENT)
Dept: OPHTHALMOLOGY | Facility: CLINIC | Age: 65
End: 2025-03-19
Payer: MEDICARE

## 2025-03-19 DIAGNOSIS — E11.9 DIABETES MELLITUS WITHOUT COMPLICATION (MULTI): Primary | ICD-10-CM

## 2025-03-19 DIAGNOSIS — H25.812 COMBINED FORM OF AGE-RELATED CATARACT, LEFT EYE: ICD-10-CM

## 2025-03-19 DIAGNOSIS — H52.13 MYOPIA OF BOTH EYES: ICD-10-CM

## 2025-03-19 DIAGNOSIS — H25.811 COMBINED FORM OF AGE-RELATED CATARACT, RIGHT EYE: ICD-10-CM

## 2025-03-19 DIAGNOSIS — H52.4 PRESBYOPIA: ICD-10-CM

## 2025-03-19 PROCEDURE — 92014 COMPRE OPH EXAM EST PT 1/>: CPT | Performed by: OPHTHALMOLOGY

## 2025-03-19 ASSESSMENT — ENCOUNTER SYMPTOMS
PSYCHIATRIC NEGATIVE: 0
EYES NEGATIVE: 1
HEMATOLOGIC/LYMPHATIC NEGATIVE: 0
GASTROINTESTINAL NEGATIVE: 0
NEUROLOGICAL NEGATIVE: 0
CARDIOVASCULAR NEGATIVE: 0
RESPIRATORY NEGATIVE: 0
ALLERGIC/IMMUNOLOGIC NEGATIVE: 0
CONSTITUTIONAL NEGATIVE: 0
ENDOCRINE NEGATIVE: 0
MUSCULOSKELETAL NEGATIVE: 0

## 2025-03-19 ASSESSMENT — VISUAL ACUITY
METHOD: SNELLEN - LINEAR
OD_BAT_MED: 20/20
OS_BAT_MED: 20/20
OS_SC: 20/30
OD_SC: 20/20

## 2025-03-19 ASSESSMENT — TONOMETRY
OS_IOP_MMHG: 16
OD_IOP_MMHG: 15
IOP_METHOD: GOLDMANN APPLANATION

## 2025-03-19 ASSESSMENT — REFRACTION_MANIFEST
OS_SPHERE: -0.75
OD_SPHERE: PLANO
OD_ADD: +2.50
OS_ADD: +2.50
OS_CYLINDER: SPHERE
OD_CYLINDER: SPHERE

## 2025-03-19 ASSESSMENT — REFRACTION_WEARINGRX
OS_SPHERE: OTC READERS
OD_SPHERE: OTC READERS

## 2025-03-20 ENCOUNTER — TELEMEDICINE (OUTPATIENT)
Dept: UROLOGY | Facility: HOSPITAL | Age: 65
End: 2025-03-20
Payer: MEDICARE

## 2025-03-20 DIAGNOSIS — C61 ADENOCARCINOMA OF PROSTATE (MULTI): ICD-10-CM

## 2025-03-20 PROCEDURE — 99214 OFFICE O/P EST MOD 30 MIN: CPT | Performed by: UROLOGY

## 2025-03-20 PROCEDURE — 1123F ACP DISCUSS/DSCN MKR DOCD: CPT | Performed by: UROLOGY

## 2025-03-20 PROCEDURE — 4010F ACE/ARB THERAPY RXD/TAKEN: CPT | Performed by: UROLOGY

## 2025-03-20 PROCEDURE — G2211 COMPLEX E/M VISIT ADD ON: HCPCS | Performed by: UROLOGY

## 2025-03-20 NOTE — PROGRESS NOTES
Virtual or Telephone Consent    An interactive audio and video telecommunication system which permits real time communications between the patient (at the originating site) and provider (at the distant site) was utilized to provide this telehealth service.   Verbal consent was requested and obtained from Kiko Petersen on this date, 03/20/25 for a telehealth visit and the patient's location was confirmed at the time of the visit.   FUV    Last seen - 2/28/25     HISTORY OF PRESENT ILLNESS:   Kiko Petersen is a 64 y.o. male who is being seen today for prostate biopsy results  - history of BPH  - elevated PSA with PI-RADS 3 on MRI 03/20/19  - [place] 3+3=6 prostate cancer Dx on 4/19/19, with PI-RADS 3 on MRI 03/27/2020 and ASAP on restaging on prostate biopsy 05/07/2020  -MRI of prostate on 9/3/24 demonstrated  Overall stable appearance of a PI-RADS 3 lesion  - On active surveillance  PAST MEDICAL HISTORY:  Past Medical History:   Diagnosis Date    Diabetes mellitus (Multi)     Encounter for screening for malignant neoplasm of colon 07/30/2019    Encounter for screening colonoscopy    Hypertension     Unspecified cataract     Cataract, right eye    Urinary tract infection, site not specified 03/02/2020    UTI (urinary tract infection), bacterial       PAST SURGICAL HISTORY:  Past Surgical History:   Procedure Laterality Date    CARDIAC ELECTROPHYSIOLOGY PROCEDURE N/A 3/12/2024    Procedure: Ablation A-Fib;  Surgeon: Jaylen Guerrero MD;  Location: [facility] Cardiac Cath Lab;  Service: Electrophysiology;  Laterality: N/A;  atrial fibrillation ablation, CARTO, general anesthesia, hold eliquis morning of procedure    EXCISION / REPAIR HYDROCELE PEDIATRIC      HERNIA REPAIR  10/14/2015    Inguinal Hernia Repair    REPLACEMENT TOTAL HIP LATERAL POSITION Left         ALLERGIES:   Allergies   Allergen Reactions    Penicillins Other, Swelling, Unknown and Anaphylaxis     Cefazolin tolerated for post-op prophylaxis  "following ortho procedure    Erythromycin Diarrhea        MEDICATIONS:   Current Outpatient Medications   Medication Instructions    Eliquis 5 mg, oral, 2 times daily    fluticasone (Flonase) 50 mcg/actuation nasal spray 2 sprays, Each Nostril, Daily, Shake gently. Before first use, prime pump. After use, clean tip and replace cap.    furosemide (LASIX) 20 mg, oral, Daily    ketoconazole (NIZOral) 2 % cream As needed    lisinopril 20 mg, oral, Daily    metFORMIN (Glucophage) 500 mg tablet TAKE 1 TABLET BY MOUTH TWO TIMES A DAY WITH MEALS    metoprolol tartrate (Lopressor) 50 mg tablet TAKE 1 TABLET BY MOUTH EVERY 12 HOURS    phytonadione (Vitamin K) 5 mg tablet Take 1 tablet (5mg) by mouth today (11/8/24)    sulfaSALAzine (Azulfidine) 500 mg tablet TAKE 2 TABLETS BY MOUTH THREE TIMES A DAY    tadalafil (CIALIS) 5 mg, oral, Daily    tamsulosin (FLOMAX) 0.8 mg, oral, Daily    tiZANidine (ZANAFLEX) 4 mg, oral, Every 6 hours PRN        PHYSICAL EXAM:  There were no vitals taken for this visit.  Constitutional: Patient appears well-developed and well-nourished. No distress.    Pulmonary/Chest: Effort normal. No respiratory distress.   Abdominal: Soft, ND NT  : WNL  Musculoskeletal: Normal range of motion.    Neurological: Alert and oriented to person, place, and time.  Psychiatric: Normal mood and affect. Behavior is normal. Thought content normal.      Labs:  No results found for: \"TESTOSTERONE\"  Lab Results   Component Value Date    PSA 12.56 (H) 09/10/2024     Lab Results   Component Value Date    PSA 12.56 (H) 09/10/2024    PSA 10.66 (H) 08/03/2024    PSA 7.80 (H) 01/29/2024    PSA 5.18 (H) 12/10/2022    PSA 4.10 (H) 05/28/2022    PSA 5.31 (H) 06/05/2021         No components found for: \"CBC\"  Lab Results   Component Value Date    CREATININE 0.65 (L) 03/07/2025     No components found for: \"TESTOTMS\"  No results found for: \"TESTF\"    Imaging:  - MRI of prostate on 9/3/24 demonstrated  Overall stable appearance of a " PI-RADS 3 lesion in the left lateral transitional zone in the mid to apical prostate as compared to the 2022 MRI. No new lesion is identified. No pelvic lymphadenopathy. No focal osseous metastatic disease.    Surgical pathology 2/28/25: Prostatic adenocarcinoma, Maximo score 6 (3+3), grade group 1, involving 2 of 2 cores, approximately 5% of submitted tissue, Atypical intraductal proliferation present  Prostate volume: 36.83 g    Discussion:  Doing well, no complaints.  He is on Tamsulosin 0.8mg and Cialis 5mg. Reviewed pathology, pt reassured that biopsy results came back stable. Assured we can continue to monitor his PSA. Will follow up in 6 months with PSA prior.     Assessment:      1. Adenocarcinoma of prostate (Multi)  PSA    PSA              Kiko Petersen is a 64 y.o. male here for FUV     Plan:   1)  follow up in 6 months with PSA prior     All questions and concerns were addressed. Patient verbalizes understanding and has no other questions at this time.     Scribe Attestation:  By signing my name below, Tiffanie AVALOS Scribe   attest that this documentation has been prepared under the direction and in the presence of René Lyle MD.

## 2025-04-17 NOTE — PROGRESS NOTES
Virtual or Telephone Consent    An interactive audio and video telecommunication system which permits real time communications between the patient (at the originating site) and provider (at the distant site) was utilized to provide this telehealth service.   Verbal consent was requested and obtained from Kiko Petersen on this date, 04/16/25 for a telehealth visit and the patient's location was confirmed at the time of the visit.   FUV    Last seen - 3/20/25     HISTORY OF PRESENT ILLNESS:   Kiko Petersen is a 64 y.o. male who is being seen today for PSA results  - history of BPH  - elevated PSA with PI-RADS 3 on MRI 03/20/19  - Maximo 3+3=6 prostate cancer Dx on 4/19/19, with PI-RADS 3 on MRI 03/27/2020 and ASAP on restaging on prostate biopsy 05/07/2020  -MRI of prostate on 9/3/24 demonstrated  Overall stable appearance of a PI-RADS 3 lesion  Surgical pathology 2/28/25: Prostatic adenocarcinoma, [place] score 6 (3+3), grade group 1, involving 2 of 2 cores, approximately 5% of submitted tissue, Atypical intraductal proliferation present  - On active surveillance  PAST MEDICAL HISTORY:  Past Medical History:   Diagnosis Date    Diabetes mellitus (Multi)     Encounter for screening for malignant neoplasm of colon 07/30/2019    Encounter for screening colonoscopy    Hypertension     Unspecified cataract     Cataract, right eye    Urinary tract infection, site not specified 03/02/2020    UTI (urinary tract infection), bacterial       PAST SURGICAL HISTORY:  Past Surgical History:   Procedure Laterality Date    CARDIAC ELECTROPHYSIOLOGY PROCEDURE N/A 3/12/2024    Procedure: Ablation A-Fib;  Surgeon: Jaylen Guerrero MD;  Location: [facility] Card[facility];  Service: Electrophysiology;  Laterality: N/A;  atrial fibrillation ablation, CARTO, general anesthesia, hold eliquis morning of procedure    EXCISION / REPAIR HYDROCELE PEDIATRIC      HERNIA REPAIR  10/14/2015    Inguinal Hernia Repair    REPLACEMENT TOTAL  "HIP LATERAL POSITION Left         ALLERGIES:   Allergies   Allergen Reactions    Penicillins Other, Swelling, Unknown and Anaphylaxis     Cefazolin tolerated for post-op prophylaxis following ortho procedure    Erythromycin Diarrhea        MEDICATIONS:   Current Outpatient Medications   Medication Instructions    Eliquis 5 mg, oral, 2 times daily    fluticasone (Flonase) 50 mcg/actuation nasal spray 2 sprays, Each Nostril, Daily, Shake gently. Before first use, prime pump. After use, clean tip and replace cap.    furosemide (LASIX) 20 mg, oral, Daily    ketoconazole (NIZOral) 2 % cream As needed    lisinopril 20 mg, oral, Daily    metFORMIN (Glucophage) 500 mg tablet TAKE 1 TABLET BY MOUTH TWO TIMES A DAY WITH MEALS    metoprolol tartrate (Lopressor) 50 mg tablet TAKE 1 TABLET BY MOUTH EVERY 12 HOURS    phytonadione (Vitamin K) 5 mg tablet Take 1 tablet (5mg) by mouth today (11/8/24)    sulfaSALAzine (Azulfidine) 500 mg tablet TAKE 2 TABLETS BY MOUTH THREE TIMES A DAY    tadalafil (CIALIS) 5 mg, oral, Daily    tamsulosin (FLOMAX) 0.8 mg, oral, Daily    tiZANidine (ZANAFLEX) 4 mg, oral, Every 6 hours PRN        PHYSICAL EXAM:  There were no vitals taken for this visit.  Constitutional: Patient appears well-developed and well-nourished. No distress.    Pulmonary/Chest: Effort normal. No respiratory distress.   Abdominal: Soft, ND NT  : WNL  Musculoskeletal: Normal range of motion.    Neurological: Alert and oriented to person, place, and time.  Psychiatric: Normal mood and affect. Behavior is normal. Thought content normal.      Labs:  No results found for: \"TESTOSTERONE\"  Lab Results   Component Value Date    PSA 12.56 (H) 09/10/2024     Lab Results   Component Value Date    PSA 12.56 (H) 09/10/2024    PSA 10.66 (H) 08/03/2024    PSA 7.80 (H) 01/29/2024    PSA 5.18 (H) 12/10/2022    PSA 4.10 (H) 05/28/2022    PSA 5.31 (H) 06/05/2021         No components found for: \"CBC\"  Lab Results   Component Value Date    " "CREATININE 0.65 (L) 03/07/2025     No components found for: \"TESTOTMS\"  No results found for: \"TESTF\"    Imaging:  - MRI of prostate on 9/3/24 demonstrated  Overall stable appearance of a PI-RADS 3 lesion in the left lateral transitional zone in the mid to apical prostate as compared to the 2022 MRI. No new lesion is identified. No pelvic lymphadenopathy. No focal osseous metastatic disease.    Surgical pathology 2/28/25: Prostatic adenocarcinoma, Maximo score 6 (3+3), grade group 1, involving 2 of 2 cores, approximately 5% of submitted tissue, Atypical intraductal proliferation present  Prostate volume: 36.83 g    Discussion:  Doing well, no complaints.  He is on Tamsulosin 0.8mg and Cialis 5mg. Reviewed pathology, pt reassured that biopsy results came back stable. Assured we can continue to monitor his PSA. Will follow up in 6 months with PSA prior.     Assessment:      No diagnosis found.          Kiko Petersen is a 64 y.o. male here for FUV     Plan:   1)  follow up in 6 months with PSA prior     All questions and concerns were addressed. Patient verbalizes understanding and has no other questions at this time.     Scribe Attestation:  By signing my name below, ITiffanie Scribe   attest that this documentation has been prepared under the direction and in the presence of René Lyle MD.  "

## 2025-04-18 ENCOUNTER — APPOINTMENT (OUTPATIENT)
Dept: UROLOGY | Facility: HOSPITAL | Age: 65
End: 2025-04-18
Payer: MEDICARE

## 2025-04-22 PROCEDURE — RXMED WILLOW AMBULATORY MEDICATION CHARGE

## 2025-04-24 ENCOUNTER — APPOINTMENT (OUTPATIENT)
Dept: CARDIOLOGY | Facility: HOSPITAL | Age: 65
End: 2025-04-24
Payer: MEDICARE

## 2025-04-24 ENCOUNTER — PHARMACY VISIT (OUTPATIENT)
Dept: PHARMACY | Facility: CLINIC | Age: 65
End: 2025-04-24
Payer: COMMERCIAL

## 2025-05-08 ENCOUNTER — APPOINTMENT (OUTPATIENT)
Dept: CARDIOLOGY | Facility: HOSPITAL | Age: 65
End: 2025-05-08
Payer: MEDICARE

## 2025-05-08 ENCOUNTER — APPOINTMENT (OUTPATIENT)
Dept: RADIOLOGY | Facility: CLINIC | Age: 65
End: 2025-05-08
Payer: MEDICARE

## 2025-05-08 DIAGNOSIS — K70.31 ALCOHOLIC CIRRHOSIS OF LIVER WITH ASCITES (MULTI): ICD-10-CM

## 2025-05-08 PROCEDURE — 76705 ECHO EXAM OF ABDOMEN: CPT

## 2025-05-10 DIAGNOSIS — I10 ESSENTIAL HYPERTENSION: ICD-10-CM

## 2025-05-10 DIAGNOSIS — E11.9 TYPE 2 DIABETES MELLITUS WITHOUT COMPLICATION, WITHOUT LONG-TERM CURRENT USE OF INSULIN: ICD-10-CM

## 2025-05-14 NOTE — PROGRESS NOTES
Counseling:  The patient was counseled regarding diagnostic results, instructions for management, risk factor reductions, prognosis, patient and family education, impressions, risks and benefits of treatment options and importance of compliance with treatment.      Chief Complaint:  The patient presents today for 6-month followup of atrial fibrillation.     History Of Present Illness:    Kiko Petersen is a 64 y.o. male patient who presents today for 6-month followup of atrial fibrillation. His PMH is significant for HTN, atrial fibrillation s/p RFA 03/12/2024, DM type 2, BPH with adenocarcinoma of the prostate, and BENNY (CPAP). Over the past 6 months, the patient states that he has done well from a cardiac standpoint. He denies any CP, chest discomfort or SOB. His only complaint is that of lower back pain with  associated lower extremity paresthesias (MR of lumbar spine 09/19/2024 with multilevel degenerative changes, right paracentral disc extrusion with 7 mm superior migration of L4-5). BP has been stable. EKG today shows that the patient is maintaining NSR. The patient is compliant with his prescribed medications.     Past Surgical History:  He has a past surgical history that includes Hernia repair (10/14/2015); Excision / repair hydrocele pediatric; Replacement total hip lateral position (Left); and Cardiac electrophysiology procedure (N/A, 3/12/2024).      Social History:  He reports that he quit smoking about 45 years ago. His smoking use included cigarettes. He started smoking about 46 years ago. He has a 0.1 pack-year smoking history. He has never used smokeless tobacco. He reports that he does not currently use alcohol. He reports that he does not use drugs.    Family History:  Family History   Problem Relation Name Age of Onset    Hypertension Mother Marina     Emphysema Mother Marina     Lung cancer Father Enrique     Hypertension Father Enrique     Hypertension Brother      Heart attack Brother      Breast  "cancer Mother's Sister Raeann     Hypertension Mother's Sister Raeann     Emphysema Mother's Sister Raeann     Colon cancer Father's Sister Lulú     Hypertension Father's Sister Lulú     Hypertension Father's Brother Gene     Hypertension Maternal Grandmother Christina     Glaucoma Maternal Grandmother Christina     Hypertension Maternal Grandfather Oscar     Heart attack Maternal Grandfather Oscar         Allergies:  Penicillins and Erythromycin    Outpatient Medications:  Current Outpatient Medications   Medication Instructions    Eliquis 5 mg, oral, 2 times daily    fluticasone (Flonase) 50 mcg/actuation nasal spray 2 sprays, Each Nostril, Daily, Shake gently. Before first use, prime pump. After use, clean tip and replace cap.    furosemide (LASIX) 20 mg, oral, Daily    ketoconazole (NIZOral) 2 % cream As needed    lisinopril 20 mg, oral, Daily    metFORMIN (Glucophage) 500 mg tablet TAKE 1 TABLET BY MOUTH TWO TIMES A DAY WITH MEALS    metoprolol tartrate (Lopressor) 50 mg tablet TAKE 1 TABLET BY MOUTH EVERY 12 HOURS    phytonadione (Vitamin K) 5 mg tablet Take 1 tablet (5mg) by mouth today (11/8/24)    sulfaSALAzine (Azulfidine) 500 mg tablet TAKE 2 TABLETS BY MOUTH THREE TIMES A DAY    tadalafil (CIALIS) 5 mg, oral, Daily    tamsulosin (FLOMAX) 0.8 mg, oral, Daily    tiZANidine (ZANAFLEX) 4 mg, oral, Every 6 hours PRN        Last Recorded Vitals:  Vitals:    05/15/25 1610   BP: 118/78   BP Location: Left arm   Patient Position: Sitting   BP Cuff Size: Adult   Pulse: 74   SpO2: 98%   Weight: 85.3 kg (188 lb)   Height: 1.778 m (5' 10\")         Review of Systems   Musculoskeletal:  Positive for back pain.   Neurological:  Positive for paresthesias.   All other systems reviewed and are negative.     Physical Exam:  Constitutional:       Appearance: Healthy appearance. Not in distress.   Neck:      Vascular: No JVR. JVD normal.   Pulmonary:      Effort: Pulmonary effort is normal.      Breath sounds: Normal breath " sounds. No wheezing. No rhonchi. No rales.   Chest:      Chest wall: Not tender to palpatation.   Cardiovascular:      PMI at left midclavicular line. Normal rate. Regular rhythm. Normal S1. Normal S2.       Murmurs: There is no murmur.      No gallop.  No click. No rub.   Pulses:     Intact distal pulses.   Edema:     Peripheral edema absent.   Abdominal:      General: Bowel sounds are normal.      Palpations: Abdomen is soft.      Tenderness: There is no abdominal tenderness.   Musculoskeletal: Normal range of motion.         General: No tenderness. Skin:     General: Skin is warm and dry.   Neurological:      General: No focal deficit present.      Mental Status: Alert and oriented to person, place and time.            Last Labs:  CBC -  Lab Results   Component Value Date    WBC 3.4 (L) 03/07/2025    HGB 13.9 03/07/2025    HCT 39.5 03/07/2025    MCV 95.6 03/07/2025     03/07/2025       CMP -  Lab Results   Component Value Date    CALCIUM 8.8 03/07/2025    PHOS 3.2 07/27/2023    PROT 6.9 03/07/2025    ALBUMIN 3.9 03/07/2025    AST 93 (H) 03/07/2025    ALT 34 03/07/2025    ALKPHOS 95 03/07/2025    BILITOT 0.7 03/07/2025       LIPID PANEL -   Lab Results   Component Value Date    CHOL 148 03/07/2025    TRIG 74 03/07/2025    HDL 93 03/07/2025    CHHDL 1.6 03/07/2025    LDLF 50 08/12/2023    VLDL 14 12/09/2024    NHDL 55 03/07/2025       RENAL FUNCTION PANEL -   Lab Results   Component Value Date    GLUCOSE 108 (H) 03/07/2025     (L) 03/07/2025    K 4.8 03/07/2025    CL 88 (L) 03/07/2025    CO2 25 03/07/2025    ANIONGAP 12 03/07/2025    BUN 5 (L) 03/07/2025    CREATININE 0.65 (L) 03/07/2025    GFRMALE >90 08/12/2023    CALCIUM 8.8 03/07/2025    PHOS 3.2 07/27/2023    ALBUMIN 3.9 03/07/2025        Lab Results   Component Value Date     (H) 07/23/2023    HGBA1C 5.8 (H) 03/07/2025       Last Cardiology Tests:  11/19/2024 - TTE  1. Left ventricular ejection fraction is normal, with a calculated  ejection fraction of 62% by auto EF.  2. There is normal right ventricular global systolic function.  3. The aortic root is abnormal. There is mild dilatation of the ascending aorta. There is mild dilatation of the aortic root.     01/05/2024 to 01/19/2024 - Holter Monitor  1. Predominant underlying rhythm was sinus rhythm; min HR 54 bpm, max  bpm, avg HR 75 bpm.  2. 31 supraventricular tachycardia runs occurred; fastest interval lasting 7 beats with max rate 169 bpm, longest lasting 29.5 seconds with avg rate 131 bpm.  3. Atrial fibrillation/flutter occurred (6% burden), ranging from  bpm (avg of 104 bpm), the longest lasting 12 hours 52 mins with avg rate 111 bpm. Atrial fibrillation/flutter was detected within +/- 45 seconds of symptomatic patient event(s).  4. Isolated SVEs were rare, SVE couplets were rare, and SVE triplets were rare.  5. Isolated VEs were rare, VE couplets were rare, and no VE triplets were present.     07/25/2023 - TTE  1. Left ventricular systolic function is normal with a 70% estimated ejection fraction.  2. There is left ventricular concentric remodeling.  3. The mitral valve is mildly thickened. The mitral valve appears to be degenerative.  4. Mildly dilated aortic root.  5. The patient is in atrial fibrillation which may influence the estimate of left ventricular function and transvalvular flows.  6. Compared with study from 12/23/2022, the patient is now in atrial fibrillation with increased HR (117 bpm). No significant differences in LV dimensions and ejection fraction.    12/23/2022 - Stress Test  1. Normal stress myocardial perfusion imaging in response to pharmacologic stress. Well-maintained left ventricular function.  2. No clinical or electrocardiographic evidence for ischemia at maximal infusion. No ECG changes from baseline.    12/23/2022 - TTE  1. Left ventricular systolic function is normal with a 60-65% estimated ejection fraction.  2. Spectral Doppler shows an  impaired relaxation pattern of left ventricular diastolic filling.    08/13/2021 - ELVA  1. The left ventricular systolic function is normal with a 55% estimated ejection fraction.  2. The aortic valve appears bicuspid.  3. Cardioversion cancelled as patient spontaneously converted to sinus rhythm during ELVA.    07/07/2017 - CT Calcium Score  Total: 0.  2. The visualized segments of the lungs are normally expanded. There are few ground-glass nodular densities in the right anterior lung and perihilar region up to 7 mm on image 3. Additional right lung nodule measuring 4 mm on image 12.  3. The visualized mid/lower ascending thoracic aorta measures 4.1 cm in diameter. Visualized thoracic aorta is mildly atherosclerotic.  4. The heart is borderline enlarged. No pericardial effusion is present.  5. No gross evidence of mediastinal or hilar lymphadenopathy is identified.  6. Small hiatal hernia. Fatty liver.    Lab review: I have personally reviewed the laboratory result(s).     Assessment/Plan   1) Paroxysmal Atrial Fibrillation s/p RFA 03/12/2024  On Eliquis 5 mg BID, furosemide 20 mg daily as needed for LE edema, lisinopril 20 mg daily, metoprolol tartrate 50 mg BID.  Planned for DCC in 08/2021, but was cancelled as patient converted to SR during ELVA  Stress testing 12/23/2022 negative for ischemia   TTE 07/25/2023 with LVEF 70%, left ventricular concentric remodeling, mildly dilated aortic root  Longstanding h/o a-fib  Followed by Dr. Burton   Hospital admission 07/2023 with syncope  Holter monitor 01/05/2024 to 01/19/2024 with 31 runs of SVT, a-fib/flutter burden of 6%  Seen by Dr. Burton 02/08/2024 - discussed AADs vs AF RFA,  patient opted for RFA  S/P successful radiofrequency ablation for paroxysmal atrial fibrillation 03/12/2024  Denies CP, chest discomfort or SOB  Denies palpitations  BP stable  Maintaining NSR  Continue current medical Rx   F/U 1 year     2) Ascending Aortic Dilatation   TTE 11/19/2024  with mild dilatation of the ascending aorta and aortic root  Repeat echo 1 year  F/U 1 year     3) Back Pain  Reports lower back pain with associated lower extremity paresthesias   MR of lumbar spine 09/19/2024 with multilevel degenerative changes, right paracentral disc extrusion with 7 mm superior migration of L4-5.      Scribe Attestation  By signing my name below, I, Charlie Bullock, attest that this documentation has been prepared under the direction and in the presence of Bashir Whelan MD.

## 2025-05-15 ENCOUNTER — OFFICE VISIT (OUTPATIENT)
Dept: CARDIOLOGY | Facility: HOSPITAL | Age: 65
End: 2025-05-15
Payer: MEDICARE

## 2025-05-15 VITALS
WEIGHT: 188 LBS | HEART RATE: 74 BPM | BODY MASS INDEX: 26.92 KG/M2 | OXYGEN SATURATION: 98 % | SYSTOLIC BLOOD PRESSURE: 118 MMHG | DIASTOLIC BLOOD PRESSURE: 78 MMHG | HEIGHT: 70 IN

## 2025-05-15 DIAGNOSIS — I71.21 ANEURYSM OF ASCENDING AORTA WITHOUT RUPTURE: ICD-10-CM

## 2025-05-15 DIAGNOSIS — I48.0 PAROXYSMAL ATRIAL FIBRILLATION (MULTI): ICD-10-CM

## 2025-05-15 LAB
ATRIAL RATE: 74 BPM
P AXIS: 6 DEGREES
P OFFSET: 185 MS
P ONSET: 134 MS
PR INTERVAL: 164 MS
Q ONSET: 216 MS
QRS COUNT: 12 BEATS
QRS DURATION: 78 MS
QT INTERVAL: 404 MS
QTC CALCULATION(BAZETT): 448 MS
QTC FREDERICIA: 433 MS
R AXIS: -38 DEGREES
T AXIS: -8 DEGREES
T OFFSET: 418 MS
VENTRICULAR RATE: 74 BPM

## 2025-05-15 PROCEDURE — 99213 OFFICE O/P EST LOW 20 MIN: CPT | Performed by: INTERNAL MEDICINE

## 2025-05-15 PROCEDURE — 3008F BODY MASS INDEX DOCD: CPT | Performed by: INTERNAL MEDICINE

## 2025-05-15 PROCEDURE — 99213 OFFICE O/P EST LOW 20 MIN: CPT | Mod: 25 | Performed by: INTERNAL MEDICINE

## 2025-05-15 PROCEDURE — 3078F DIAST BP <80 MM HG: CPT | Performed by: INTERNAL MEDICINE

## 2025-05-15 PROCEDURE — 4010F ACE/ARB THERAPY RXD/TAKEN: CPT | Performed by: INTERNAL MEDICINE

## 2025-05-15 PROCEDURE — 1160F RVW MEDS BY RX/DR IN RCRD: CPT | Performed by: INTERNAL MEDICINE

## 2025-05-15 PROCEDURE — 1159F MED LIST DOCD IN RCRD: CPT | Performed by: INTERNAL MEDICINE

## 2025-05-15 PROCEDURE — 93005 ELECTROCARDIOGRAM TRACING: CPT | Performed by: INTERNAL MEDICINE

## 2025-05-15 PROCEDURE — 3074F SYST BP LT 130 MM HG: CPT | Performed by: INTERNAL MEDICINE

## 2025-05-15 PROCEDURE — 1036F TOBACCO NON-USER: CPT | Performed by: INTERNAL MEDICINE

## 2025-05-15 ASSESSMENT — ENCOUNTER SYMPTOMS
BACK PAIN: 1
PARESTHESIAS: 1

## 2025-05-15 NOTE — PATIENT INSTRUCTIONS
Continue all current medications as prescribed.  Repeat echocardiogram (ultrasound of the heart) in 1 year to followup on your heart function and structure.   Followup with Dr. Whelan in 1 year, sooner should any issues or concerns arise before then.     If you have any questions or cardiac concerns, please call our office at [phone].

## 2025-05-19 PROCEDURE — RXMED WILLOW AMBULATORY MEDICATION CHARGE

## 2025-05-20 ENCOUNTER — PHARMACY VISIT (OUTPATIENT)
Dept: PHARMACY | Facility: CLINIC | Age: 65
End: 2025-05-20
Payer: COMMERCIAL

## 2025-06-04 NOTE — PROGRESS NOTES
"  Pharmacist Clinic: Cardiology Management    Kiko Petersen is a 64 y.o. male was referred to Clinical Pharmacy Team for Anticoagulation management.     Referring Provider: Brittany Montelongo APRN*    THIS IS A FOLLOW UP PATIENT APPOINTMENT. AT LAST VISIT ON 12/5/24 WITH PHARMACIST (Delvin Lopes).    Appointment was completed by Kiko who was reached at [phone].    REVIEW OF PAST APPNT (IF APPLICABLE):    PAP for Eliquis approved through 11/13/25.    Allergies[1]    Medical History[2]    Medications Ordered Prior to Encounter[3]      RELEVANT LAB RESULTS:  Lab Results   Component Value Date    BILITOT 0.7 03/07/2025    CALCIUM 8.8 03/07/2025    CO2 25 03/07/2025    CL 88 (L) 03/07/2025    CREATININE 0.65 (L) 03/07/2025    GLUCOSE 108 (H) 03/07/2025    ALKPHOS 95 03/07/2025    K 4.8 03/07/2025    PROT 6.9 03/07/2025     (L) 03/07/2025    AST 93 (H) 03/07/2025    ALT 34 03/07/2025    BUN 5 (L) 03/07/2025    ANIONGAP 12 03/07/2025    MG 1.81 08/09/2022    PHOS 3.2 07/27/2023    ALBUMIN 3.9 03/07/2025    GFRF CANCELED 03/09/2023    GFRMALE >90 08/12/2023     Lab Results   Component Value Date    TRIG 74 03/07/2025    CHOL 148 03/07/2025    LDLCALC 40 03/07/2025    HDL 93 03/07/2025     No results found for: \"BMCBC\", \"CBCDIF\"     PHARMACEUTICAL ASSESSMENT:    MEDICATION RECONCILIATION    Was a medication reconciliation completed at this visit? No  Home Pharmacy Reviewed? Yes, describe: [facility] home delivery for Eliquis     Drug Interactions? No    Medication Documentation Review Audit       Reviewed by Bashir Whelan MD (Physician) on 05/15/25 at 1632      Medication Order Taking? Sig Documenting Provider Last Dose Status   apixaban (Eliquis) 5 mg tablet [id number] Yes Take 1 tablet (5 mg) by mouth 2 times a day. KAYLEEN Carlson-CNP  Active   fluticasone (Flonase) 50 mcg/actuation nasal spray [id number] Yes Administer 2 sprays into each nostril once daily. Shake gently. Before first use, prime pump. " After use, clean tip and replace cap. Paolo Rogers MD  Active   furosemide (Lasix) 20 mg tablet [id number] Yes TAKE 1 TABLET BY MOUTH EVERY DAY Humphrey Leal MD  Active   ketoconazole (NIZOral) 2 % cream [id number] Yes Apply topically if needed. Kris Light MD Taking Active   lisinopril 20 mg tablet [id number] Yes Take 1 tablet (20 mg) by mouth once daily. Paolo Rogers MD  Active   metFORMIN (Glucophage) 500 mg tablet [id number] Yes TAKE 1 TABLET BY MOUTH TWO TIMES A DAY WITH MEALS Paolo Rogers MD  Active   metoprolol tartrate (Lopressor) 50 mg tablet [id number] Yes TAKE 1 TABLET BY MOUTH EVERY 12 HOURS Saurabh Grossman PA-C Taking Active    Patient not taking:   Discontinued 05/15/25 1632   sulfaSALAzine (Azulfidine) 500 mg tablet [id number] Yes TAKE 2 TABLETS BY MOUTH THREE TIMES A DAY Tye Tran MD Taking Active    Patient not taking:   Discontinued 05/15/25 1632   tamsulosin (Flomax) 0.4 mg 24 hr capsule [id number] Yes Take 2 capsules (0.8 mg) by mouth once daily. René Lyle MD Taking Active   tiZANidine (Zanaflex) 4 mg tablet [id number] Yes Take 1 tablet (4 mg) by mouth every 6 hours if needed for muscle spasms. Paolo Rogers MD  Active                    DISEASE MANAGEMENT ASSESSMENT:     ANTICOAGULATION ASSESSMENT    The 10-year ASCVD risk score (Radha VERA, et al., 2019) is: 15.1%    Values used to calculate the score:      Age: 64 years      Sex: Male      Is Non- : No      Diabetic: Yes      Tobacco smoker: No      Systolic Blood Pressure: 118 mmHg      Is BP treated: Yes      HDL Cholesterol: 93 mg/dL      Total Cholesterol: 148 mg/dL    DIAGNOSIS: prevention of nonvalvular atrial fibrilliation stroke and systemic embolism  - Patient is projected to be on anticoagulation indefinitely  - [id number] Score: [3] (only included if diagnosis is atrial fibrillation)   Age: [<65 (0)] [65-74 (+1)] [> 75 (+2)]: 1  Sex: [Male/Female (+1)]: 0  CHF history: [No/Yes(+1)]:  0  Hypertension history: [No/Yes(+1)]: 1  Stroke/TIA/thromboembolism history: [No/Yes(+2)]: 0  Vascular disease history (prior MI, peripheral artery disease, aortic plaque): [No/Yes(+1)]: 0  Diabetes history: [No/Yes(+1)]: 1    CURRENT PHARMACOTHERAPY:    Eliquis 5mg BID  Age: 65 y/o  Weight: 85.3 kg  SCr: 0.65 mg/dL    Affordability/Accessibility:  PAP  Adherence/Organization: reports adherence  Adverse Reactions: none  Recent Hospitalizations: none  Recent Falls/Trauma: none  Changes in Tobacco or Alcohol Intake:   Tobacco: non-smoker  Alcohol: 2-3 drinks per week, no changes     EDUCATION/COUNSELING:   - Counseled patient on MOA, expectations, duration of therapy, contraindications, administration, and monitoring parameters  - Counseled patient of side effects that are indicative of bleeding such as dark tarry stool, unexplainable bruising, or vomiting up a coffee ground like substance    DISCUSSION/NOTES:   Patient reported no unusual bruising or bleeding with Eliquis.    PAP for Eliquis approved through 11/13/25, will follow up in October for PAP renewal.     ASSESSMENT:    Assessment/Plan   Problem List Items Addressed This Visit       Paroxysmal atrial fibrillation (Multi)    Eliquis 5mg BID dose appropriate based on age, weight, and SCr.          Relevant Orders    Referral to Clinical Pharmacy       RECOMMENDATIONS/PLAN:    Continue: Eliquis 5mg BID    Last Appnt with Referring Provider: 10/23/24  Next Appnt with Referring Provider: not scheduled  Clinical Pharmacist follow up: 10/9/25 3:00PM  VAF/Application Expiration: Yes    Date: 11/13/25  Type of Encounter: Rj Carrion PharmD    Verbal consent to manage patient's drug therapy was obtained from the patient . They were informed they may decline to participate or withdraw from participation in pharmacy services at any time.    Continue all meds under the continuation of care with the referring provider and clinical pharmacy  team.           [1]   Allergies  Allergen Reactions    Penicillins Other, Swelling, Unknown and Anaphylaxis     Cefazolin tolerated for post-op prophylaxis following ortho procedure    Erythromycin Diarrhea   [2]   Past Medical History:  Diagnosis Date    Diabetes mellitus (Multi)     Encounter for screening for malignant neoplasm of colon 07/30/2019    Encounter for screening colonoscopy    Hypertension     Unspecified cataract     Cataract, right eye    Urinary tract infection, site not specified 03/02/2020    UTI (urinary tract infection), bacterial   [3]   Current Outpatient Medications on File Prior to Visit   Medication Sig Dispense Refill    apixaban (Eliquis) 5 mg tablet Take 1 tablet (5 mg) by mouth 2 times a day. 180 tablet 3    fluticasone (Flonase) 50 mcg/actuation nasal spray Administer 2 sprays into each nostril once daily. Shake gently. Before first use, prime pump. After use, clean tip and replace cap. 48 g 1    furosemide (Lasix) 20 mg tablet TAKE 1 TABLET BY MOUTH EVERY DAY 90 tablet 1    ketoconazole (NIZOral) 2 % cream Apply topically if needed.      lisinopril 20 mg tablet Take 1 tablet (20 mg) by mouth once daily. 90 tablet 0    metFORMIN (Glucophage) 500 mg tablet TAKE 1 TABLET BY MOUTH TWO TIMES A DAY WITH MEALS 180 tablet 0    metoprolol tartrate (Lopressor) 50 mg tablet TAKE 1 TABLET BY MOUTH EVERY 12 HOURS 180 tablet 3    sulfaSALAzine (Azulfidine) 500 mg tablet TAKE 2 TABLETS BY MOUTH THREE TIMES A  tablet 3    tamsulosin (Flomax) 0.4 mg 24 hr capsule Take 2 capsules (0.8 mg) by mouth once daily. 180 capsule 3    tiZANidine (Zanaflex) 4 mg tablet Take 1 tablet (4 mg) by mouth every 6 hours if needed for muscle spasms. 30 tablet 1     No current facility-administered medications on file prior to visit.

## 2025-06-05 ENCOUNTER — APPOINTMENT (OUTPATIENT)
Dept: PHARMACY | Facility: HOSPITAL | Age: 65
End: 2025-06-05
Payer: MEDICARE

## 2025-06-05 DIAGNOSIS — I48.0 PAROXYSMAL ATRIAL FIBRILLATION (MULTI): ICD-10-CM

## 2025-06-05 NOTE — Clinical Note
Reports adherence to Eliquis. No abnormal bruising or bleeding reported. No dose adjustments needed.

## 2025-06-11 ENCOUNTER — APPOINTMENT (OUTPATIENT)
Dept: PRIMARY CARE | Facility: CLINIC | Age: 65
End: 2025-06-11
Payer: MEDICARE

## 2025-06-22 LAB
ALBUMIN SERPL-MCNC: 4 G/DL (ref 3.6–5.1)
ALP SERPL-CCNC: 86 U/L (ref 35–144)
ALT SERPL-CCNC: 25 U/L (ref 9–46)
ANION GAP SERPL CALCULATED.4IONS-SCNC: 13 MMOL/L (CALC) (ref 7–17)
AST SERPL-CCNC: 73 U/L (ref 10–35)
BILIRUB SERPL-MCNC: 0.9 MG/DL (ref 0.2–1.2)
BUN SERPL-MCNC: 5 MG/DL (ref 7–25)
CALCIUM SERPL-MCNC: 8.9 MG/DL (ref 8.6–10.3)
CHLORIDE SERPL-SCNC: 93 MMOL/L (ref 98–110)
CHOLEST SERPL-MCNC: 166 MG/DL
CHOLEST/HDLC SERPL: 1.6 (CALC)
CO2 SERPL-SCNC: 22 MMOL/L (ref 20–32)
CREAT SERPL-MCNC: 0.52 MG/DL (ref 0.7–1.35)
EGFRCR SERPLBLD CKD-EPI 2021: 111 ML/MIN/1.73M2
EST. AVERAGE GLUCOSE BLD GHB EST-MCNC: 111 MG/DL
EST. AVERAGE GLUCOSE BLD GHB EST-SCNC: 6.2 MMOL/L
GLUCOSE SERPL-MCNC: 110 MG/DL (ref 65–99)
HBA1C MFR BLD: 5.5 %
HDLC SERPL-MCNC: 104 MG/DL
LDLC SERPL CALC-MCNC: 47 MG/DL (CALC)
NONHDLC SERPL-MCNC: 62 MG/DL (CALC)
POTASSIUM SERPL-SCNC: 4.3 MMOL/L (ref 3.5–5.3)
PROT SERPL-MCNC: 7 G/DL (ref 6.1–8.1)
SODIUM SERPL-SCNC: 128 MMOL/L (ref 135–146)
TRIGL SERPL-MCNC: 66 MG/DL

## 2025-06-25 ENCOUNTER — APPOINTMENT (OUTPATIENT)
Dept: PRIMARY CARE | Facility: CLINIC | Age: 65
End: 2025-06-25
Payer: MEDICARE

## 2025-06-25 VITALS
WEIGHT: 185.2 LBS | OXYGEN SATURATION: 98 % | BODY MASS INDEX: 26.57 KG/M2 | HEART RATE: 77 BPM | SYSTOLIC BLOOD PRESSURE: 110 MMHG | TEMPERATURE: 97.9 F | DIASTOLIC BLOOD PRESSURE: 50 MMHG

## 2025-06-25 DIAGNOSIS — Z85.46 HISTORY OF PROSTATE CANCER: ICD-10-CM

## 2025-06-25 DIAGNOSIS — K51.90 ULCERATIVE COLITIS WITHOUT COMPLICATIONS, UNSPECIFIED LOCATION (MULTI): ICD-10-CM

## 2025-06-25 DIAGNOSIS — K76.6 PORTAL HYPERTENSION (MULTI): ICD-10-CM

## 2025-06-25 DIAGNOSIS — G47.33 OSA ON CPAP: ICD-10-CM

## 2025-06-25 DIAGNOSIS — G57.12 MERALGIA PARAESTHETICA, LEFT: ICD-10-CM

## 2025-06-25 DIAGNOSIS — J31.0 GUSTATORY RHINITIS: ICD-10-CM

## 2025-06-25 DIAGNOSIS — E11.9 TYPE 2 DIABETES MELLITUS WITHOUT COMPLICATION, WITHOUT LONG-TERM CURRENT USE OF INSULIN: Primary | ICD-10-CM

## 2025-06-25 DIAGNOSIS — I10 ESSENTIAL HYPERTENSION: ICD-10-CM

## 2025-06-25 DIAGNOSIS — M51.361 DEGENERATION OF INTERVERTEBRAL DISC OF LUMBAR REGION WITH LOWER EXTREMITY PAIN: ICD-10-CM

## 2025-06-25 PROCEDURE — 3074F SYST BP LT 130 MM HG: CPT | Performed by: INTERNAL MEDICINE

## 2025-06-25 PROCEDURE — 3078F DIAST BP <80 MM HG: CPT | Performed by: INTERNAL MEDICINE

## 2025-06-25 PROCEDURE — G2211 COMPLEX E/M VISIT ADD ON: HCPCS | Performed by: INTERNAL MEDICINE

## 2025-06-25 PROCEDURE — 1159F MED LIST DOCD IN RCRD: CPT | Performed by: INTERNAL MEDICINE

## 2025-06-25 PROCEDURE — 99214 OFFICE O/P EST MOD 30 MIN: CPT | Performed by: INTERNAL MEDICINE

## 2025-06-25 PROCEDURE — 4010F ACE/ARB THERAPY RXD/TAKEN: CPT | Performed by: INTERNAL MEDICINE

## 2025-06-25 RX ORDER — TIZANIDINE 4 MG/1
4 TABLET ORAL EVERY 6 HOURS PRN
Qty: 30 TABLET | Refills: 1 | Status: CANCELLED | OUTPATIENT
Start: 2025-06-25

## 2025-06-25 RX ORDER — METFORMIN HYDROCHLORIDE 500 MG/1
500 TABLET ORAL
Qty: 180 TABLET | Refills: 0 | Status: CANCELLED | OUTPATIENT
Start: 2025-06-25

## 2025-06-25 RX ORDER — LISINOPRIL 20 MG/1
20 TABLET ORAL DAILY
Qty: 90 TABLET | Refills: 0 | Status: SHIPPED | OUTPATIENT
Start: 2025-06-25

## 2025-06-25 RX ORDER — FLUTICASONE PROPIONATE 50 MCG
2 SPRAY, SUSPENSION (ML) NASAL DAILY
Qty: 48 G | Refills: 1 | Status: CANCELLED | OUTPATIENT
Start: 2025-06-25

## 2025-06-25 RX ORDER — IPRATROPIUM BROMIDE 21 UG/1
2 SPRAY, METERED NASAL 2 TIMES DAILY
Qty: 90 ML | Refills: 0 | Status: SHIPPED | OUTPATIENT
Start: 2025-06-25

## 2025-06-25 ASSESSMENT — PATIENT HEALTH QUESTIONNAIRE - PHQ9
1. LITTLE INTEREST OR PLEASURE IN DOING THINGS: NOT AT ALL
SUM OF ALL RESPONSES TO PHQ9 QUESTIONS 1 AND 2: 0
2. FEELING DOWN, DEPRESSED OR HOPELESS: NOT AT ALL

## 2025-06-25 ASSESSMENT — ENCOUNTER SYMPTOMS
PSYCHIATRIC NEGATIVE: 1
CONSTITUTIONAL NEGATIVE: 1
CARDIOVASCULAR NEGATIVE: 1
EYES NEGATIVE: 1
RESPIRATORY NEGATIVE: 1
GASTROINTESTINAL NEGATIVE: 1
ALLERGIC/IMMUNOLOGIC NEGATIVE: 1
NEUROLOGICAL NEGATIVE: 1
MUSCULOSKELETAL NEGATIVE: 1
ENDOCRINE NEGATIVE: 1
HEMATOLOGIC/LYMPHATIC NEGATIVE: 1

## 2025-06-25 NOTE — PROGRESS NOTES
Subjective   Patient ID: Kiko Petersen is a 64 y.o. male who presents for Follow-up (3 month follow up ).  Patient presents in follow up regarding hypertension.  Blood pressure has been well controlled on current therapy.  No adverse effects of medication reported.  Patient denies chest pain, palpitations, shortness of breath, orthopnea, fatigue or edema.    Patient presents in follow up re:  Type 2 diabetes.  Patient has been compliant with medical therapy as prescribed and mostly compliant with diet and exercise recommendations.  HgbA1c has been maintained at goal level.  No hypoglycemia reported and no other associated complaints.          Review of Systems   Constitutional: Negative.    HENT: Negative.     Eyes: Negative.    Respiratory: Negative.     Cardiovascular: Negative.    Gastrointestinal: Negative.    Endocrine: Negative.    Genitourinary: Negative.    Musculoskeletal: Negative.    Skin: Negative.    Allergic/Immunologic: Negative.    Neurological: Negative.    Hematological: Negative.    Psychiatric/Behavioral: Negative.         Objective     Blood pressure 110/50, pulse 77, temperature 36.6 °C (97.9 °F), temperature source Temporal, weight 84 kg (185 lb 3.2 oz), SpO2 98%.   Physical Exam  Constitutional:       Appearance: Normal appearance.   HENT:      Head: Normocephalic and atraumatic.      Right Ear: External ear normal.      Left Ear: External ear normal.      Nose: Nose normal.      Mouth/Throat:      Mouth: Mucous membranes are moist.   Eyes:      Conjunctiva/sclera: Conjunctivae normal.      Pupils: Pupils are equal, round, and reactive to light.   Neck:      Vascular: No carotid bruit.   Cardiovascular:      Rate and Rhythm: Normal rate. Rhythm irregular.      Pulses: Normal pulses.      Heart sounds: Normal heart sounds. No murmur heard.  Pulmonary:      Effort: Pulmonary effort is normal.      Breath sounds: Normal breath sounds. No wheezing or rales.   Abdominal:      General: Abdomen is  flat. There is no distension.      Palpations: Abdomen is soft.      Tenderness: There is no abdominal tenderness. There is no right CVA tenderness or left CVA tenderness.   Musculoskeletal:         General: Normal range of motion.      Cervical back: Normal range of motion and neck supple.   Skin:     General: Skin is warm and dry.   Neurological:      General: No focal deficit present.      Mental Status: He is alert and oriented to person, place, and time.   Psychiatric:         Mood and Affect: Mood normal.         Behavior: Behavior normal.         Assessment/Plan   Problem List Items Addressed This Visit       Ulcerative colitis    BENNY on CPAP    Essential hypertension    Relevant Medications    lisinopril 20 mg tablet    Other Relevant Orders    Comprehensive Metabolic Panel    Portal hypertension (Multi)    Meralgia paraesthetica, left    History of prostate cancer     Other Visit Diagnoses         Type 2 diabetes mellitus without complication, without long-term current use of insulin    -  Primary    Relevant Orders    Albumin-Creatinine Ratio, Urine Random    Comprehensive Metabolic Panel    Lipid Panel    Hemoglobin A1C      Gustatory rhinitis        Relevant Medications    ipratropium (Atrovent) 21 mcg (0.03 %) nasal spray      Degeneration of intervertebral disc of lumbar region with lower extremity pain                Sugars well controlled overall with A1c at goal.  Will continue present medical therapy and follow up.  Renal function is excellent and he is on ACE-I therapy.  Once again, lab did not collect and run urine ACR.  Will re-order.  BP well controlled on current therapy.  Will continue present therapy and follow.  Edema resolved with discontinuation of nifedipine.  Lipid levels are excellent without intervention and cardiac eval negative other than the hx of a-fib, so absolutely no indication for statin therapy.  He continues to follow with Cardiology re:  hx of a-fib.  He is s/p ablation and  doing very well.  He follows with GI re:  ulcerative colitis and hepatic pathology.    Pt. continues to use CPAP nightly and is tolerating well.  Advised to continue treatment and will continue to follow clinically.   He is doing well s/p left hip arthroplasty.  He had colonoscopy in 8/2022.  He follows with Dr. Lyle re:  hx of prostate cancer.   He also follows with Hepatology, Dr. Ciaran Patel, for chronic liver issues.  See above.    He has persistent problems with gustatory rhinitis despite treating with Flonase.  He is advised to stop the Flonase and will replace with .      Follow up in 3 months  Lab to be drawn 1 week prior to next office visit.

## 2025-07-28 DIAGNOSIS — R00.0 TACHYCARDIA: ICD-10-CM

## 2025-07-28 DIAGNOSIS — I48.91 ATRIAL FIBRILLATION, UNSPECIFIED TYPE (MULTI): ICD-10-CM

## 2025-07-28 DIAGNOSIS — I10 ESSENTIAL HYPERTENSION: ICD-10-CM

## 2025-07-28 RX ORDER — METOPROLOL TARTRATE 50 MG/1
50 TABLET ORAL EVERY 12 HOURS
Qty: 120 TABLET | Refills: 0 | Status: CANCELLED | OUTPATIENT
Start: 2025-07-28 | End: 2026-07-28

## 2025-07-28 NOTE — TELEPHONE ENCOUNTER
Kiko called to get a refill on the following :    Metformin 500 mg 1 tablet 2 times a day    Pharmacy: [facility] [place]    Last seen: 6/25/2025  Future appointment: 9/17/2025

## 2025-07-30 DIAGNOSIS — E11.9 TYPE 2 DIABETES MELLITUS WITHOUT COMPLICATION, WITHOUT LONG-TERM CURRENT USE OF INSULIN: ICD-10-CM

## 2025-07-30 RX ORDER — METFORMIN HYDROCHLORIDE 500 MG/1
500 TABLET ORAL
Qty: 180 TABLET | Refills: 0 | Status: SHIPPED | OUTPATIENT
Start: 2025-07-30

## 2025-07-30 NOTE — TELEPHONE ENCOUNTER
Kiko called back because he did not hear from anyone.  I gave him your message.  You prescribed the Metformin in March 2025 for a 90 day supply.  Kiko still had medication and did not fill the March script until May 2025.  He has only got the Metformin from you.  Please advised.  He is now out of the medication.     Metformin 500 mg 1 tablet 2 times a day     Pharmacy: [facility] [place]     Last seen: 6/25/2025  Future appointment: 9/17/2025

## 2025-08-15 ENCOUNTER — APPOINTMENT (OUTPATIENT)
Dept: ORTHOPEDIC SURGERY | Facility: CLINIC | Age: 65
End: 2025-08-15
Payer: MEDICARE

## 2025-08-17 DIAGNOSIS — I48.91 ATRIAL FIBRILLATION, UNSPECIFIED TYPE (MULTI): ICD-10-CM

## 2025-08-17 DIAGNOSIS — I10 ESSENTIAL HYPERTENSION: ICD-10-CM

## 2025-08-17 DIAGNOSIS — R00.0 TACHYCARDIA: ICD-10-CM

## 2025-08-18 PROCEDURE — RXMED WILLOW AMBULATORY MEDICATION CHARGE

## 2025-08-19 ENCOUNTER — PHARMACY VISIT (OUTPATIENT)
Dept: PHARMACY | Facility: CLINIC | Age: 65
End: 2025-08-19
Payer: COMMERCIAL

## 2025-08-19 RX ORDER — METOPROLOL TARTRATE 50 MG/1
50 TABLET ORAL EVERY 12 HOURS
Qty: 180 TABLET | Refills: 3 | Status: SHIPPED | OUTPATIENT
Start: 2025-08-19

## 2025-08-24 DIAGNOSIS — I10 ESSENTIAL HYPERTENSION: ICD-10-CM

## 2025-08-24 DIAGNOSIS — E11.9 TYPE 2 DIABETES MELLITUS WITHOUT COMPLICATION, WITHOUT LONG-TERM CURRENT USE OF INSULIN: ICD-10-CM

## 2025-08-26 ENCOUNTER — OFFICE VISIT (OUTPATIENT)
Dept: ORTHOPEDIC SURGERY | Facility: CLINIC | Age: 65
End: 2025-08-26
Payer: MEDICARE

## 2025-08-26 DIAGNOSIS — M54.16 LUMBAR RADICULOPATHY: ICD-10-CM

## 2025-08-26 DIAGNOSIS — M48.061 DEGENERATIVE LUMBAR SPINAL STENOSIS: ICD-10-CM

## 2025-08-26 PROCEDURE — 99212 OFFICE O/P EST SF 10 MIN: CPT

## 2025-08-26 PROCEDURE — 4010F ACE/ARB THERAPY RXD/TAKEN: CPT | Performed by: ORTHOPAEDIC SURGERY

## 2025-08-26 PROCEDURE — 1125F AMNT PAIN NOTED PAIN PRSNT: CPT | Performed by: ORTHOPAEDIC SURGERY

## 2025-08-26 PROCEDURE — 99214 OFFICE O/P EST MOD 30 MIN: CPT | Performed by: ORTHOPAEDIC SURGERY

## 2025-08-26 PROCEDURE — 1159F MED LIST DOCD IN RCRD: CPT | Performed by: ORTHOPAEDIC SURGERY

## 2025-08-26 ASSESSMENT — PAIN SCALES - GENERAL: PAINLEVEL_OUTOF10: 8

## 2025-08-26 ASSESSMENT — PAIN - FUNCTIONAL ASSESSMENT: PAIN_FUNCTIONAL_ASSESSMENT: 0-10

## 2025-08-28 DIAGNOSIS — I10 ESSENTIAL HYPERTENSION: ICD-10-CM

## 2025-08-28 RX ORDER — LISINOPRIL 20 MG/1
20 TABLET ORAL DAILY
Qty: 30 TABLET | Refills: 0 | Status: SHIPPED | OUTPATIENT
Start: 2025-08-28

## 2025-08-29 DIAGNOSIS — C61 ADENOCARCINOMA OF PROSTATE (MULTI): ICD-10-CM

## 2025-09-17 ENCOUNTER — APPOINTMENT (OUTPATIENT)
Dept: PRIMARY CARE | Facility: CLINIC | Age: 65
End: 2025-09-17
Payer: MEDICARE

## 2025-10-01 ENCOUNTER — APPOINTMENT (OUTPATIENT)
Dept: PRIMARY CARE | Facility: CLINIC | Age: 65
End: 2025-10-01
Payer: MEDICARE

## 2025-10-02 ENCOUNTER — APPOINTMENT (OUTPATIENT)
Dept: UROLOGY | Facility: HOSPITAL | Age: 65
End: 2025-10-02
Payer: MEDICARE

## 2025-10-09 ENCOUNTER — APPOINTMENT (OUTPATIENT)
Dept: PHARMACY | Facility: HOSPITAL | Age: 65
End: 2025-10-09
Payer: MEDICARE

## 2026-03-25 ENCOUNTER — APPOINTMENT (OUTPATIENT)
Dept: OPHTHALMOLOGY | Age: 66
End: 2026-03-25
Payer: MEDICARE

## (undated) DEVICE — PATCHES, EXTERNAL REFERENCE, CARTO3

## (undated) DEVICE — CATHETER, THERMOCOOL SMART TOUCH, SF, D-F CURVE

## (undated) DEVICE — INTRODUCER, HEMOSTASIS, STR/J .038 IN, 9FR 12CM

## (undated) DEVICE — SHEATH, STEERABLE, SUREFLEX, MEDIUM CURVE

## (undated) DEVICE — INTRODUCER, SHEATH, FAST-CATH, 8FR X 12CM, C-LOCK

## (undated) DEVICE — CATHETER, DIAGNOSTIC, SOUNDSTAR ECO SMS, 8FR

## (undated) DEVICE — CATHETHER, CS, BI-DIRECTIONAL, 10 POLES, D-F TYPE

## (undated) DEVICE — CATHETER, OCTARAY, OCTA,GALAXY,3-3-3-3-3,D-CURVE

## (undated) DEVICE — TUBING SET, IRRIGATION, SMARTABLATE

## (undated) DEVICE — CLOSURE SYSTEM, VASCULAR, MVP 6-12FR, VENOUS

## (undated) DEVICE — NEEDLE, NRG TRANSSEPTAL, 98CM, CURVE C0

## (undated) DEVICE — INTRODUCER, HEMOSTASIS, STR/J .038 IN, 8.5FR 12CM